# Patient Record
Sex: FEMALE | Race: WHITE | NOT HISPANIC OR LATINO | Employment: OTHER | URBAN - METROPOLITAN AREA
[De-identification: names, ages, dates, MRNs, and addresses within clinical notes are randomized per-mention and may not be internally consistent; named-entity substitution may affect disease eponyms.]

---

## 2017-01-30 ENCOUNTER — ALLSCRIPTS OFFICE VISIT (OUTPATIENT)
Dept: OTHER | Facility: OTHER | Age: 82
End: 2017-01-30

## 2017-02-04 ENCOUNTER — GENERIC CONVERSION - ENCOUNTER (OUTPATIENT)
Dept: OTHER | Facility: OTHER | Age: 82
End: 2017-02-04

## 2017-02-20 ENCOUNTER — GENERIC CONVERSION - ENCOUNTER (OUTPATIENT)
Dept: OTHER | Facility: OTHER | Age: 82
End: 2017-02-20

## 2017-02-21 DIAGNOSIS — M25.559 PAIN IN HIP: ICD-10-CM

## 2017-02-23 ENCOUNTER — ALLSCRIPTS OFFICE VISIT (OUTPATIENT)
Dept: OTHER | Facility: OTHER | Age: 82
End: 2017-02-23

## 2017-04-24 ENCOUNTER — ALLSCRIPTS OFFICE VISIT (OUTPATIENT)
Dept: OTHER | Facility: OTHER | Age: 82
End: 2017-04-24

## 2017-05-22 ENCOUNTER — ALLSCRIPTS OFFICE VISIT (OUTPATIENT)
Dept: OTHER | Facility: OTHER | Age: 82
End: 2017-05-22

## 2017-05-22 DIAGNOSIS — R53.83 OTHER FATIGUE: ICD-10-CM

## 2017-07-19 ENCOUNTER — GENERIC CONVERSION - ENCOUNTER (OUTPATIENT)
Dept: OTHER | Facility: OTHER | Age: 82
End: 2017-07-19

## 2017-09-25 ENCOUNTER — ALLSCRIPTS OFFICE VISIT (OUTPATIENT)
Dept: OTHER | Facility: OTHER | Age: 82
End: 2017-09-25

## 2017-09-25 DIAGNOSIS — R07.89 OTHER CHEST PAIN: ICD-10-CM

## 2017-10-17 ENCOUNTER — GENERIC CONVERSION - ENCOUNTER (OUTPATIENT)
Dept: OTHER | Facility: OTHER | Age: 82
End: 2017-10-17

## 2017-12-04 ENCOUNTER — GENERIC CONVERSION - ENCOUNTER (OUTPATIENT)
Dept: OTHER | Facility: OTHER | Age: 82
End: 2017-12-04

## 2017-12-11 ENCOUNTER — GENERIC CONVERSION - ENCOUNTER (OUTPATIENT)
Dept: FAMILY MEDICINE CLINIC | Facility: CLINIC | Age: 82
End: 2017-12-11

## 2018-01-12 VITALS
HEART RATE: 69 BPM | HEIGHT: 61 IN | BODY MASS INDEX: 29.07 KG/M2 | DIASTOLIC BLOOD PRESSURE: 78 MMHG | WEIGHT: 154 LBS | SYSTOLIC BLOOD PRESSURE: 150 MMHG | RESPIRATION RATE: 15 BRPM

## 2018-01-13 VITALS
RESPIRATION RATE: 15 BRPM | SYSTOLIC BLOOD PRESSURE: 154 MMHG | WEIGHT: 153.25 LBS | HEIGHT: 61 IN | DIASTOLIC BLOOD PRESSURE: 88 MMHG | OXYGEN SATURATION: 97 % | BODY MASS INDEX: 28.93 KG/M2 | HEART RATE: 78 BPM

## 2018-01-14 VITALS
DIASTOLIC BLOOD PRESSURE: 80 MMHG | HEIGHT: 61 IN | WEIGHT: 153 LBS | BODY MASS INDEX: 28.89 KG/M2 | HEART RATE: 92 BPM | OXYGEN SATURATION: 97 % | SYSTOLIC BLOOD PRESSURE: 132 MMHG | TEMPERATURE: 98.5 F | RESPIRATION RATE: 15 BRPM

## 2018-01-14 VITALS
SYSTOLIC BLOOD PRESSURE: 138 MMHG | WEIGHT: 152.38 LBS | DIASTOLIC BLOOD PRESSURE: 80 MMHG | RESPIRATION RATE: 15 BRPM | BODY MASS INDEX: 28.77 KG/M2 | HEIGHT: 61 IN | OXYGEN SATURATION: 96 % | HEART RATE: 76 BPM

## 2018-01-15 VITALS
HEIGHT: 61 IN | SYSTOLIC BLOOD PRESSURE: 152 MMHG | OXYGEN SATURATION: 98 % | HEART RATE: 91 BPM | DIASTOLIC BLOOD PRESSURE: 84 MMHG | BODY MASS INDEX: 29.86 KG/M2 | WEIGHT: 158.13 LBS | RESPIRATION RATE: 15 BRPM

## 2018-01-22 VITALS — DIASTOLIC BLOOD PRESSURE: 90 MMHG | SYSTOLIC BLOOD PRESSURE: 154 MMHG

## 2018-01-22 VITALS — DIASTOLIC BLOOD PRESSURE: 100 MMHG | SYSTOLIC BLOOD PRESSURE: 162 MMHG

## 2018-01-23 NOTE — MISCELLANEOUS
Chief Complaint  Chief Complaint Free Text Note Form: pt rescheduled due to weather  History of Present Illness  TCM Communication [facility]: The patient is being contacted for follow-up after hospitalization and PT CONTACTED FOR JATINDER  She was hospitalized at [facility]  The date of admission: 11/13/2017, date of discharge: 12/6/2017  She was discharged to home  Medications reviewed and updated today  She scheduled a follow up appointment  The patient is currently asymptomatic  Counseling was provided to patient's caretaker  Communication performed and completed by NS      Active Problems    1  Abnormal loss of weight (783 21) (R63 4)   2  Acute sinusitis (461 9) (J01 90)   3  Allergic rhinitis (477 9) (J30 9)   4  Ambulatory dysfunction (719 7) (R26 2)   5  Anemia (285 9) (D64 9)   6  Ankle pain, unspecified laterality   7  Anxiety (300 00) (F41 9)   8  Atypical chest pain (786 59) (R07 89)   9  Backache (724 5) (M54 9)   10  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)   11  Benign essential hypertension (401 1) (I10)   12  Chest pain, precordial (786 51) (R07 2)   13  Chronic GERD (530 81) (K21 9)   14  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)   15  Cough (786 2) (R05)   16  Deep venous thrombosis of distal lower extremity (453 42) (I82 4Z9)   17  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, unspecified laterality (715 97) (M19 079)   18  Dental disorder (525 9) (K08 9)   19  Depression (311) (F32 9)   20  Depression screen (V79 0) (Z13 89)   21  Diaphragmatic hernia without obstruction or gangrene (553 3) (K44 9)   22  Diastolic dysfunction (548 2) (I51 9)   23  Edema (782 3) (R60 9)   24  Encounter for screening for malignant neoplasm of colon (V76 51) (Z12 11)   25  Encounter for special screening examination for genitourinary disorder (V81 6) (Z13 89)   26  Enteritis (558 9) (K52 9)   27  Fatigue (780 79) (R53 83)   28  Heart block (426 9) (I45 9)   29   Hemorrhage of anus and rectum (569 3) (K62 5) 30  Hip pain, unspecified laterality   31  History of allergy (V15 09) (Z88 9)   32  Hyperlipidemia (272 4) (E78 5)   33  Hypertension (401 9) (I10)   34  Joint pain, hip (719 45) (M25 559)   35  Leg swelling (729 81) (M79 89)   36  Need for immunization against influenza (V04 81) (Z23)   37  Need for pneumococcal vaccination (V03 82) (Z23)   38  Osteoarthritis (715 90) (M19 90)   39  Osteoarthritis of knee (715 36) (M17 10)   40  Osteoporosis (733 00) (M81 0)   41  PHN (postherpetic neuralgia) (053 19) (B02 29)   42  Preop examination (V72 84) (Z01 818)   43  Pre-operative clearance (V72 84) (Z01 818)   44  Sciatica (724 3) (M54 30)   45  Screening for neurological condition (V80 09) (Z13 89)   46  Seborrheic keratosis (702 19) (L82 1)   47  Trochanteric bursitis of left hip (726 5) (M70 62)   48  Vertigo (780 4) (R42)    Past Medical History    1  History of Abnormal electrocardiogram (794 31) (R94 31)   2  History of Acute upper respiratory infection (465 9) (J06 9)   3  History of Arthritis (V13 4)   4  History of Diverticulosis (562 10) (K57 90)   5  History of Encounter for screening mammogram for malignant neoplasm of breast   (V76 12) (Z12 31)   6  History of acute sinusitis (V12 69) (Z87 09)   7  History of atrial fibrillation (V12 59) (Z86 79)   8  History of complete atrioventricular block (V12 59) (Z86 79)   9  History of gastroesophageal reflux (GERD) (V12 79) (Z87 19)   10  History of glaucoma (V12 49) (Z86 69)   11  History of hiatal hernia (V12 79) (Z87 19)   12  History of hypertension (V12 59) (Z86 79)   13  History of intermittent claudication (V12 50) (Z86 79)   14  History of Macular degeneration (362 50) (H35 30)   15  History of Macular degeneration (362 50) (H35 30)   16  History of Nausea (787 02) (R11 0)   17  History of Pericardial Effusion (423 9)   18  History of Pulmonary Embolism (V12 51)    Surgical History    1  History of Appendectomy   2  History of Hysterectomy   3   History of Tonsillectomy With Adenoidectomy    Family History  Mother    1  Family history of Hypertension (V17 49)  Father    2  Family history of Diabetes Mellitus (V18 0)   3  Family history of Heart Disease (V17 49)   4  Family history of Hypertension (V17 49)  Sister    5  Family history of Alzheimer Disease   6  Family history of Colon Cancer (V16 0)    Social History    · Denied: History of Alcohol Use (History)   · Never A Smoker    Current Meds   1  ALPRAZolam 0 25 MG Oral Tablet; TAKE 1 TABLET EVERY 12 HOURS AS NEEDED; Last   Rx:09Ynk6764 Ordered   2  DiazePAM 2 MG Oral Tablet; TAKE 1 TABLET TWICE DAILY AS NEEDED; Therapy: 88IKE4430 to (Evaluate:24Mar2018); Last Rx:30Iul2383 Ordered   3  Escitalopram Oxalate 5 MG Oral Tablet; TAKE 1 TABLET DAILY; Therapy: 01WSC6301 to (Blip[employer] Speaker)  Requested for: 99YCF4869; Last   Rx:06Nov2017 Ordered   4  Esomeprazole Magnesium 40 MG Oral Capsule Delayed Release; TAKE 1 CAPSULE BY   MOUTH EVERY DAY AS NEEDED; Therapy: 87NJK0022 to (Evaluate:34Dfp9506)  Requested for: 11EAC5585; Last   Rx:32Hda0312 Ordered   5  Furosemide 20 MG Oral Tablet; take 1 tablet by mouth once daily as directed; Therapy: 72IPR8336 to (Evaluate:18Mar2018)  Requested for: 65Oft4164; Last   Rx:19Sep2017 Ordered   6  Lidoderm 5 % External Patch; APPLY 1 PATCH TO THE AFFECTED AREA AND LEAVE IN   PLACE FOR 12 HOURS, THEN REMOVE AND LEAVE OFF FOR 12 HOURS; Therapy: 91UYL7119 to (Evaluate:18Oct2017)  Requested for: 34Fsu9887; Last   Rx:55Zyl0028 Ordered   7  Medrol 4 MG Oral Tablet Therapy Pack; take as directed; Therapy: 91NRA9241 to (Last Rx:30Jan2017) Ordered   8  Melatonin TABS; Therapy: (Recorded:16Nov2012) to Recorded   9  Methocarbamol 500 MG Oral Tablet; TAKE 1 TABLET 3 TIMES DAILY AS NEEDED; Therapy: 82Bvu8160 to (Last Rx:05Ykq1304) Ordered   10  Montelukast Sodium 10 MG Oral Tablet; TAKE 1 TABLET DAILY AS DIRECTED;     Therapy: 89XXC4232 to (Blip[employer] Speaker)  Requested for: 29OPR7159; Last    YE:44SJK8395 Ordered   11  Proctozone-HC 2 5 % Rectal Cream; APPLY RECTALLY THREE TIMES A DAY AS    DIRECTED; Therapy: 04BNL7550 to (Evaluate:12Vfh9697); Last Rx:30Jun2016 Ordered   12  Valsartan 160 MG Oral Tablet; take 1 tablet daily for blood pressure; Therapy: 57Rxa0609 to (Last Rx:17Oct2017)  Requested for: 17Oct2017 Ordered   13  Voltaren 1 % Transdermal Gel; APPLY TO UPPER EXTREMITIES, 2 GM OF GEL TO    AFFECTED AREA 4 TIMES DAILY  DO NOT APPLY MORE THAN 8 GM DAILY TO ANY    ONE AFFECTED JOINT; Therapy: 69BAD7077 to (Last FL:65WFA3914)  Requested for: 15OFR2904 Ordered   14  Xarelto 15 MG Oral Tablet; Therapy: 89Iye3184 to Recorded    Allergies    1   Codeine Derivatives    Future Appointments    Date/Time Provider Specialty Site   12/28/2017 08:00 AM MD Abdulaziz Story [id number]     Signatures   Electronically signed by : Ayala Acosta MD; Dec 26 2017  1:47PM EST                       (Author)

## 2018-01-30 RX ORDER — MIRTAZAPINE 15 MG/1
1 TABLET, FILM COATED ORAL
COMMUNITY
End: 2018-07-06 | Stop reason: SDUPTHER

## 2018-01-30 RX ORDER — LIDOCAINE 50 MG/G
1 PATCH TOPICAL
COMMUNITY
Start: 2017-02-23 | End: 2019-11-15

## 2018-01-30 RX ORDER — ESOMEPRAZOLE MAGNESIUM 40 MG/1
1 CAPSULE, DELAYED RELEASE ORAL DAILY
COMMUNITY
Start: 2016-06-15 | End: 2018-07-06 | Stop reason: SDUPTHER

## 2018-01-30 RX ORDER — METHOCARBAMOL 500 MG/1
1 TABLET, FILM COATED ORAL 3 TIMES DAILY PRN
COMMUNITY
Start: 2013-09-10 | End: 2019-11-15

## 2018-01-30 RX ORDER — ESCITALOPRAM OXALATE 5 MG/1
1 TABLET ORAL DAILY
COMMUNITY
Start: 2016-05-27 | End: 2018-07-06 | Stop reason: SDUPTHER

## 2018-01-30 RX ORDER — FUROSEMIDE 20 MG/1
40 TABLET ORAL DAILY
COMMUNITY
Start: 2014-07-15 | End: 2018-11-12

## 2018-01-30 RX ORDER — MONTELUKAST SODIUM 10 MG/1
1 TABLET ORAL DAILY
COMMUNITY
Start: 2017-11-06 | End: 2018-03-08 | Stop reason: SDUPTHER

## 2018-01-30 RX ORDER — ALPRAZOLAM 0.25 MG/1
1 TABLET ORAL EVERY 12 HOURS PRN
COMMUNITY
End: 2018-03-08 | Stop reason: SDUPTHER

## 2018-01-30 RX ORDER — DIAZEPAM 2 MG/1
1 TABLET ORAL 2 TIMES DAILY PRN
COMMUNITY
Start: 2017-09-25 | End: 2019-11-15

## 2018-01-30 RX ORDER — POTASSIUM CHLORIDE 1500 MG/1
TABLET, FILM COATED, EXTENDED RELEASE ORAL DAILY
COMMUNITY
Start: 2018-01-04 | End: 2019-11-08 | Stop reason: SDUPTHER

## 2018-01-30 RX ORDER — VALSARTAN 160 MG/1
1 TABLET ORAL DAILY
COMMUNITY
Start: 2017-04-24 | End: 2018-04-20

## 2018-01-30 RX ORDER — PHENOL 1.4 %
3 AEROSOL, SPRAY (ML) MUCOUS MEMBRANE
COMMUNITY

## 2018-02-02 ENCOUNTER — OFFICE VISIT (OUTPATIENT)
Dept: FAMILY MEDICINE CLINIC | Facility: CLINIC | Age: 83
End: 2018-02-02
Payer: MEDICARE

## 2018-02-02 VITALS
HEART RATE: 81 BPM | BODY MASS INDEX: 27.19 KG/M2 | WEIGHT: 144 LBS | SYSTOLIC BLOOD PRESSURE: 128 MMHG | OXYGEN SATURATION: 95 % | DIASTOLIC BLOOD PRESSURE: 64 MMHG | HEIGHT: 61 IN | RESPIRATION RATE: 14 BRPM

## 2018-02-02 DIAGNOSIS — I89.0 LYMPHEDEMA: ICD-10-CM

## 2018-02-02 DIAGNOSIS — I10 ESSENTIAL HYPERTENSION: ICD-10-CM

## 2018-02-02 DIAGNOSIS — I10 BENIGN ESSENTIAL HYPERTENSION: ICD-10-CM

## 2018-02-02 DIAGNOSIS — K59.00 CONSTIPATION, UNSPECIFIED CONSTIPATION TYPE: Primary | ICD-10-CM

## 2018-02-02 DIAGNOSIS — M54.9 BACK PAIN, UNSPECIFIED BACK LOCATION, UNSPECIFIED BACK PAIN LATERALITY, UNSPECIFIED CHRONICITY: ICD-10-CM

## 2018-02-02 DIAGNOSIS — I82.4Z9 DEEP VEIN THROMBOSIS (DVT) OF DISTAL VEIN OF LOWER EXTREMITY, UNSPECIFIED CHRONICITY, UNSPECIFIED LATERALITY (HCC): ICD-10-CM

## 2018-02-02 DIAGNOSIS — I82.5Y9 CHRONIC VENOUS EMBOLISM AND THROMBOSIS OF DEEP VESSELS OF PROXIMAL LOWER EXTREMITY, UNSPECIFIED LATERALITY (HCC): ICD-10-CM

## 2018-02-02 PROBLEM — I48.91 ATRIAL FIBRILLATION (HCC): Status: ACTIVE | Noted: 2017-10-20

## 2018-02-02 PROBLEM — R26.9 GAIT ABNORMALITY: Status: ACTIVE | Noted: 2017-10-27

## 2018-02-02 PROBLEM — B02.29 PHN (POSTHERPETIC NEURALGIA): Status: ACTIVE | Noted: 2017-02-23

## 2018-02-02 PROBLEM — S42.302A FRACTURE OF LEFT HUMERUS: Status: ACTIVE | Noted: 2017-10-27

## 2018-02-02 PROBLEM — S06.5XAA SDH (SUBDURAL HEMATOMA): Status: ACTIVE | Noted: 2017-10-27

## 2018-02-02 PROBLEM — Z86.711 HX PULMONARY EMBOLISM: Status: ACTIVE | Noted: 2017-10-20

## 2018-02-02 PROBLEM — Z86.718 HISTORY OF DVT (DEEP VEIN THROMBOSIS): Status: ACTIVE | Noted: 2017-10-20

## 2018-02-02 PROBLEM — S02.401A CLOSED FRACTURE OF MAXILLARY SINUS (HCC): Status: ACTIVE | Noted: 2017-10-20

## 2018-02-02 PROBLEM — S72.002A FRACTURE OF FEMORAL NECK, LEFT (HCC): Status: ACTIVE | Noted: 2017-10-27

## 2018-02-02 PROBLEM — R41.3 MEMORY IMPAIRMENT: Status: ACTIVE | Noted: 2017-10-25

## 2018-02-02 PROBLEM — Z95.0 PACEMAKER: Status: ACTIVE | Noted: 2017-10-27

## 2018-02-02 PROBLEM — S42.292D CLOSED 4-PART FRACTURE OF PROXIMAL END OF LEFT HUMERUS WITH ROUTINE HEALING: Status: ACTIVE | Noted: 2017-10-27

## 2018-02-02 PROBLEM — F32.A DEPRESSED: Status: ACTIVE | Noted: 2017-10-27

## 2018-02-02 PROBLEM — I60.9 SAH (SUBARACHNOID HEMORRHAGE) (HCC): Status: ACTIVE | Noted: 2017-10-27

## 2018-02-02 PROBLEM — S06.5X9A SDH (SUBDURAL HEMATOMA) (HCC): Status: ACTIVE | Noted: 2017-10-27

## 2018-02-02 PROBLEM — Z96.649 S/P HIP HEMIARTHROPLASTY: Status: ACTIVE | Noted: 2017-10-27

## 2018-02-02 PROBLEM — S06.6X9A SUBARACHNOID HEMORRHAGE FOLLOWING INJURY, WITH LOSS OF CONSCIOUSNESS (HCC): Status: ACTIVE | Noted: 2017-11-21

## 2018-02-02 PROBLEM — S72.009A HIP FRACTURE (HCC): Status: ACTIVE | Noted: 2017-10-19

## 2018-02-02 PROBLEM — Z96.612 STATUS POST REVERSE TOTAL REPLACEMENT OF LEFT SHOULDER: Status: ACTIVE | Noted: 2017-10-27

## 2018-02-02 PROCEDURE — 99214 OFFICE O/P EST MOD 30 MIN: CPT | Performed by: FAMILY MEDICINE

## 2018-02-02 RX ORDER — ACETAMINOPHEN 500 MG
1000 TABLET ORAL DAILY PRN
COMMUNITY
Start: 2017-10-27

## 2018-02-02 RX ORDER — FUROSEMIDE 40 MG/1
40 TABLET ORAL DAILY
Refills: 4 | COMMUNITY
Start: 2018-01-10 | End: 2018-07-06 | Stop reason: SDUPTHER

## 2018-02-02 RX ORDER — FLUTICASONE PROPIONATE 50 MCG
2 SPRAY, SUSPENSION (ML) NASAL
COMMUNITY
End: 2018-10-18 | Stop reason: SDUPTHER

## 2018-02-02 RX ORDER — OXYCODONE HYDROCHLORIDE 15 MG/1
7.5 TABLET ORAL EVERY 6 HOURS
COMMUNITY
Start: 2017-10-27 | End: 2018-07-12

## 2018-02-02 RX ORDER — SENNOSIDES 8.6 MG
1 TABLET ORAL
Qty: 120 EACH | Refills: 5 | Status: SHIPPED | OUTPATIENT
Start: 2018-02-02 | End: 2019-11-15

## 2018-02-02 RX ORDER — ESOMEPRAZOLE MAGNESIUM 40 MG/1
40 CAPSULE, DELAYED RELEASE ORAL
COMMUNITY
End: 2019-01-08 | Stop reason: SDUPTHER

## 2018-02-02 RX ORDER — LIDOCAINE 50 MG/G
1 PATCH TOPICAL DAILY
Qty: 30 PATCH | Refills: 0 | Status: SHIPPED | OUTPATIENT
Start: 2018-02-02 | End: 2018-08-17 | Stop reason: SDUPTHER

## 2018-02-02 RX ORDER — DIPHENOXYLATE HYDROCHLORIDE AND ATROPINE SULFATE 2.5; .025 MG/1; MG/1
1 TABLET ORAL DAILY
COMMUNITY

## 2018-02-02 RX ORDER — POTASSIUM CHLORIDE 20 MEQ/1
20 TABLET, EXTENDED RELEASE ORAL DAILY
Refills: 5 | COMMUNITY
Start: 2018-01-04 | End: 2018-07-06 | Stop reason: SDUPTHER

## 2018-02-02 RX ORDER — RIVAROXABAN 20 MG/1
20 TABLET, FILM COATED ORAL DAILY
Refills: 11 | COMMUNITY
Start: 2018-01-04 | End: 2021-01-08 | Stop reason: SDUPTHER

## 2018-02-02 RX ORDER — SENNA AND DOCUSATE SODIUM 50; 8.6 MG/1; MG/1
1 TABLET, FILM COATED ORAL
COMMUNITY
Start: 2017-10-27 | End: 2019-09-09 | Stop reason: SDUPTHER

## 2018-02-02 NOTE — PROGRESS NOTES
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, unspecified laterality (HCC)    Deep vein thrombosis (DVT) of distal vein of lower extremity, unspecified chronicity, unspecified laterality ([facility] [address] )    Essential hypertension    Other orders  -     ALPRAZolam (XANAX) 0 25 mg tablet; Take 1 tablet by mouth every 12 (twelve) hours as needed  -     diazepam (VALIUM) 2 mg tablet; Take 1 tablet by mouth 2 (two) times a day as needed  -     escitalopram (LEXAPRO) 5 mg tablet; Take 1 tablet by mouth daily  -     esomeprazole (NexIUM) 40 MG capsule; Take 1 capsule by mouth daily  -     furosemide (LASIX) 20 mg tablet; Take 40 mg by mouth daily    -     lidocaine (LIDODERM) 5 %; Apply 1 patch topically  -     Melatonin 10 MG TABS; Take by mouth  -     methocarbamol (ROBAXIN) 500 mg tablet; Take 1 tablet by mouth 3 (three) times a day as needed  -     mirtazapine (REMERON) 15 mg tablet; Take 1 tablet by mouth  -     montelukast (SINGULAIR) 10 mg tablet; Take 1 tablet by mouth daily  -     Potassium Chloride ER 20 MEQ TBCR; Take by mouth daily  -     rivaroxaban (XARELTO) 15 mg tablet; Take by mouth  -     valsartan (DIOVAN) 160 mg tablet;  Take 1 tablet by mouth daily  -     hydrocortisone (PROCTOZONE-HC) 2 5 % rectal cream; Insert into the rectum  -     diclofenac sodium (VOLTAREN) 1 %; Place on the skin  -     multivitamin (THERAGRAN) TABS; Take 1 tablet by mouth daily  -     calcium-vitamin D 250-100 MG-UNIT per tablet; Take 1 tablet by mouth 2 (two) times a day  -     acetaminophen (TYLENOL) 500 mg tablet; Take 1,000 mg by mouth  -     esomeprazole (NexIUM) 40 MG capsule; Take 40 mg by mouth  -     fluticasone (FLONASE) 50 mcg/act nasal spray; 2 sprays into each nostril  -     furosemide (LASIX) 40 mg tablet; Take 40 mg by mouth daily  -     hydrocortisone (ANUSOL-HC) 2 5 % rectal cream; Insert 1 application into the rectum Three times a day  -     oxyCODONE (ROXICODONE) 15 mg immediate release tablet; Take 7 5 mg by mouth every 6 (six) hours  -     potassium chloride (K-DUR,KLOR-CON) 20 mEq tablet; Take 20 mEq by mouth daily  -     XARELTO 20 MG tablet; Take 20 mg by mouth daily  -     senna-docusate sodium (SENOKOT-S) 8 6-50 mg per tablet; Take 1 tablet by mouth          Subjective:      Patient ID: Valarie Alejandre is a 80 y o  female  She fell on October 19, 2017  She was at her sister's house  She states that it was a nice day and she did not slip on ice  She does not believe that she tripped on anything  There was no warning to the fall  She feels that her hip gave out  She was able to get back into her car and go home, but then passed out and was found by a neighbor  She was life flighted to [place] [address]   She had a subdural hematoma with an active bleed, a fractured jaw, a fractured shoulder, and  A hip fracture  There was no intervention required for the subdural hematoma  The fractured jaw a healed without intervention  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  She was in [place] [address] for about a week and then in inpatient rehabilitation for another 10 days  She was then discharged to  [address] [place] [address] for further physical therapy before coming home on December 10th  She feels unsteady on her feet and is off-balance   She has been receiving home physical therapy and is doing well and certainly benefit from this  Both she her son and I feel that she would benefit further from continued physical therapy on both her balance and her shoulder  She has significant lymphedema which limits her mobility  She lives alone and has difficult time applying her usual compression stockings especially given the weakness that is associated with her shoulder replacement  She used pneumatic lymphedema pumps in the hospital and had good success  She would certainly be a good candidate for home-based pneumatic compression therapy and would benefit greatly from its use  The following portions of the patient's history were reviewed and updated as appropriate:   She  has a past medical history of Abnormal electrocardiogram; Arthritis; Atrial fibrillation ([address] ); Complete atrioventricular block ([address] ); Diverticulosis; GERD (gastroesophageal reflux disease); Glaucoma; Hiatal hernia; Hypertension; Intermittent claudication ([address] ); Macular degeneration; Pericardial effusion; and Pulmonary embolism ([address] )  She  does not have any pertinent problems on file  She  has a past surgical history that includes Appendectomy; Hysterectomy; and Tonsillectomy and adenoidectomy  Her family history includes Alzheimer's disease in her sister; Colon cancer in her sister; Diabetes in her father; Heart disease in her father; Hypertension in her father and mother  She  reports that she has never smoked  She has never used smokeless tobacco  She reports that she does not drink alcohol  Her drug history is not on file    Current Outpatient Prescriptions   Medication Sig Dispense Refill    acetaminophen (TYLENOL) 500 mg tablet Take 1,000 mg by mouth      ALPRAZolam (XANAX) 0 25 mg tablet Take 1 tablet by mouth every 12 (twelve) hours as needed      calcium-vitamin D 250-100 MG-UNIT per tablet Take 1 tablet by mouth 2 (two) times a day      diclofenac sodium (VOLTAREN) 1 % Place on the skin      escitalopram (LEXAPRO) 5 mg tablet Take 1 tablet by mouth daily      esomeprazole (NexIUM) 40 MG capsule Take 1 capsule by mouth daily      furosemide (LASIX) 20 mg tablet Take 40 mg by mouth daily        hydrocortisone (PROCTOZONE-HC) 2 5 % rectal cream Insert into the rectum      lidocaine (LIDODERM) 5 % Apply 1 patch topically      Melatonin 10 MG TABS Take by mouth      mirtazapine (REMERON) 15 mg tablet Take 1 tablet by mouth      montelukast (SINGULAIR) 10 mg tablet Take 1 tablet by mouth daily      multivitamin (THERAGRAN) TABS Take 1 tablet by mouth daily      oxyCODONE (ROXICODONE) 15 mg immediate release tablet Take 7 5 mg by mouth every 6 (six) hours      Potassium Chloride ER 20 MEQ TBCR Take by mouth daily      rivaroxaban (XARELTO) 15 mg tablet Take by mouth      senna-docusate sodium (SENOKOT-S) 8 6-50 mg per tablet Take 1 tablet by mouth      diazepam (VALIUM) 2 mg tablet Take 1 tablet by mouth 2 (two) times a day as needed      esomeprazole (NexIUM) 40 MG capsule Take 40 mg by mouth      fluticasone (FLONASE) 50 mcg/act nasal spray 2 sprays into each nostril      furosemide (LASIX) 40 mg tablet Take 40 mg by mouth daily  4    hydrocortisone (ANUSOL-HC) 2 5 % rectal cream Insert 1 application into the rectum Three times a day      lidocaine (LIDODERM) 5 % Place 1 patch on the skin daily Remove & Discard patch within 12 hours or as directed by MD 30 patch 0    methocarbamol (ROBAXIN) 500 mg tablet Take 1 tablet by mouth 3 (three) times a day as needed      potassium chloride (K-DUR,KLOR-CON) 20 mEq tablet Take 20 mEq by mouth daily  5    senna (SENOKOT) 8 6 mg Take 1 tablet (8 6 mg total) by mouth daily at bedtime 120 each 5    valsartan (DIOVAN) 160 mg tablet Take 1 tablet by mouth daily      XARELTO 20 MG tablet Take 20 mg by mouth daily  11     No current facility-administered medications for this visit        No current outpatient prescriptions on file prior to visit  No current facility-administered medications on file prior to visit  She is allergic to codeine       Review of Systems   All other systems reviewed and are negative  Objective:     Physical Exam   Constitutional: She is oriented to person, place, and time  Neck: Normal range of motion  Neck supple  Cardiovascular: Normal rate, regular rhythm, normal heart sounds and intact distal pulses  Pulmonary/Chest: Effort normal and breath sounds normal    Abdominal: Soft  Bowel sounds are normal    Neurological: She is alert and oriented to person, place, and time  She has normal reflexes  Skin: Skin is warm and dry  Nursing note and vitals reviewed

## 2018-02-23 ENCOUNTER — TELEPHONE (OUTPATIENT)
Dept: FAMILY MEDICINE CLINIC | Facility: CLINIC | Age: 83
End: 2018-02-23

## 2018-03-08 DIAGNOSIS — J30.2 SEASONAL ALLERGIC RHINITIS, UNSPECIFIED CHRONICITY, UNSPECIFIED TRIGGER: Primary | ICD-10-CM

## 2018-03-08 DIAGNOSIS — F41.9 ANXIETY: ICD-10-CM

## 2018-03-08 RX ORDER — ALPRAZOLAM 0.25 MG/1
0.25 TABLET ORAL EVERY 12 HOURS PRN
Qty: 60 TABLET | Refills: 5 | Status: SHIPPED | OUTPATIENT
Start: 2018-03-08 | End: 2019-10-09 | Stop reason: SDUPTHER

## 2018-03-08 RX ORDER — MONTELUKAST SODIUM 10 MG/1
10 TABLET ORAL DAILY
Qty: 30 TABLET | Refills: 5 | Status: SHIPPED | OUTPATIENT
Start: 2018-03-08 | End: 2019-01-08 | Stop reason: SDUPTHER

## 2018-04-18 RX ORDER — AMOXICILLIN 500 MG/1
CAPSULE ORAL
COMMUNITY
Start: 2018-01-10 | End: 2019-09-09 | Stop reason: SDUPTHER

## 2018-04-20 ENCOUNTER — OFFICE VISIT (OUTPATIENT)
Dept: FAMILY MEDICINE CLINIC | Facility: CLINIC | Age: 83
End: 2018-04-20
Payer: MEDICARE

## 2018-04-20 VITALS
OXYGEN SATURATION: 93 % | DIASTOLIC BLOOD PRESSURE: 84 MMHG | HEIGHT: 61 IN | BODY MASS INDEX: 28.36 KG/M2 | WEIGHT: 150.2 LBS | SYSTOLIC BLOOD PRESSURE: 152 MMHG | HEART RATE: 75 BPM | RESPIRATION RATE: 16 BRPM

## 2018-04-20 DIAGNOSIS — I45.9 HEART BLOCK: ICD-10-CM

## 2018-04-20 DIAGNOSIS — K44.9 DIAPHRAGMATIC HERNIA WITHOUT OBSTRUCTION OR GANGRENE: ICD-10-CM

## 2018-04-20 DIAGNOSIS — I10 BENIGN ESSENTIAL HYPERTENSION: ICD-10-CM

## 2018-04-20 DIAGNOSIS — I48.91 ATRIAL FIBRILLATION, UNSPECIFIED TYPE (HCC): ICD-10-CM

## 2018-04-20 DIAGNOSIS — I10 ESSENTIAL HYPERTENSION: Primary | ICD-10-CM

## 2018-04-20 PROCEDURE — 99214 OFFICE O/P EST MOD 30 MIN: CPT | Performed by: FAMILY MEDICINE

## 2018-04-20 RX ORDER — VALSARTAN 40 MG/1
40 TABLET ORAL DAILY
Qty: 30 TABLET | Refills: 5 | Status: SHIPPED | OUTPATIENT
Start: 2018-04-20 | End: 2018-08-15

## 2018-04-20 NOTE — PROGRESS NOTES
Assessment/Plan:    No problem-specific Assessment & Plan notes found for this encounter  Diagnoses and all orders for this visit:    Essential hypertension  -     valsartan (DIOVAN) 40 mg tablet; Take 1 tablet (40 mg total) by mouth daily  -     Lipid panel; Future  -     TSH, 3rd generation; Future  -     CBC and differential; Future    Other orders  -     triamcinolone (KENALOG) 0 1 % ointment; APPLY TWICE A DAY TO AFFECTED AREA X1-2 WEEKS  -     amoxicillin (AMOXIL) 500 mg capsule;           Subjective:      Patient ID: Shannan Joseph is a 80 y o  female  Her BP is running a bit high in the office today  She has no chest pain and no increased shortness of breath  She was taken off Valsartan at some point  She continues to follow with cardiology for her history of complete heart block  She is s/p pacer placement  She had multiple complications from the procedure but she has been doing well for some time  She is anticoagulated for questionable afib  She also has a diaphragmatic hernia  She has been evaluated by cardiothoracic surgery and is not a surgical candidate  She has on going fatigue  The following portions of the patient's history were reviewed and updated as appropriate:   She  has a past medical history of Abnormal electrocardiogram; Arthritis; Atrial fibrillation ([address] ); Complete atrioventricular block ([address] ); Diverticulosis; GERD (gastroesophageal reflux disease); Glaucoma; Hiatal hernia; Hypertension; Intermittent claudication ([address] ); Macular degeneration; Pericardial effusion; and Pulmonary embolism ([address] )    She   Patient Active Problem List    Diagnosis Date Noted    Constipation 02/02/2018    Lymphedema 02/02/2018    Subarachnoid hemorrhage following injury, with loss of consciousness ([address] ) 11/21/2017    Closed 4-part fracture of proximal end of left humerus with routine healing 10/27/2017    Depressed 10/27/2017    Fracture of femoral neck, left ([address] ) 10/27/2017    Fracture of left humerus 10/27/2017    Gait abnormality 10/27/2017    Pacemaker 10/27/2017    S/P hip hemiarthroplasty 10/27/2017    SAH (subarachnoid hemorrhage) ([facility]) 10/27/2017    SDH (subdural hematoma) ([facility]) 10/27/2017    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    Memory impairment 10/25/2017    Atrial fibrillation ([facility]) 10/20/2017    Closed fracture of maxillary sinus ([facility]) 10/20/2017    History of DVT (deep vein thrombosis) 10/20/2017    Hx pulmonary embolism 10/20/2017    Hip fracture ([facility]) 10/19/2017    PHN (postherpetic neuralgia) 02/23/2017    Cough 08/22/2016    Hemorrhage of anus and rectum 06/30/2016    Chronic GERD 06/15/2016    Ambulatory dysfunction 04/12/2016    Joint pain, hip 01/04/2016    Trochanteric bursitis of left hip 01/04/2016    Enteritis 12/15/2015    Diaphragmatic hernia without obstruction or gangrene 06/01/2015    Seborrheic keratosis 01/23/2015    Hypertension 12/34/5188    Diastolic dysfunction 89/24/1180    Fatigue 07/15/2014    Edema 06/30/2014    Anemia 11/14/2013    Heart block 11/14/2013    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 ([facility]) 09/10/2013    Sciatica 09/10/2013    Backache 08/30/2013    Vertigo 08/08/2013    Leg swelling 08/01/2013    Hip pain, acute, unspecified laterality 01/11/2013    Deep venous thrombosis of distal lower extremity [facility] 11/20/2012    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, unspecified laterality 11/20/2012    Osteoarthritis of knee 11/20/2012    Osteoporosis 11/20/2012    Anxiety 11/16/2012    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    Benign essential hypertension 11/16/2012    Hyperlipidemia 11/16/2012    Osteoarthritis 11/16/2012     She  has a past surgical history that includes Appendectomy; Hysterectomy; and Tonsillectomy and adenoidectomy    Her family history includes Alzheimer's disease in her sister; Colon cancer in her sister; Diabetes in her father; Heart disease in her father; Hypertension in her father and mother  She  reports that she has never smoked  She has never used smokeless tobacco  She reports that she does not drink alcohol or use drugs    Current Outpatient Prescriptions   Medication Sig Dispense Refill    acetaminophen (TYLENOL) 500 mg tablet Take 1,000 mg by mouth      ALPRAZolam (XANAX) 0 25 mg tablet Take 1 tablet (0 25 mg total) by mouth every 12 (twelve) hours as needed for anxiety 60 tablet 5    amoxicillin (AMOXIL) 500 mg capsule       calcium-vitamin D 250-100 MG-UNIT per tablet Take 1 tablet by mouth 2 (two) times a day      diazepam (VALIUM) 2 mg tablet Take 1 tablet by mouth 2 (two) times a day as needed      diclofenac sodium (VOLTAREN) 1 % Place on the skin      escitalopram (LEXAPRO) 5 mg tablet Take 1 tablet by mouth daily      esomeprazole (NexIUM) 40 MG capsule Take 1 capsule by mouth daily      fluticasone (FLONASE) 50 mcg/act nasal spray 2 sprays into each nostril      furosemide (LASIX) 40 mg tablet Take 40 mg by mouth daily  4    hydrocortisone (ANUSOL-HC) 2 5 % rectal cream Insert 1 application into the rectum Three times a day      hydrocortisone (PROCTOZONE-HC) 2 5 % rectal cream Insert into the rectum      lidocaine (LIDODERM) 5 % Apply 1 patch topically      lidocaine (LIDODERM) 5 % Place 1 patch on the skin daily Remove & Discard patch within 12 hours or as directed by MD 30 patch 0    Melatonin 10 MG TABS Take by mouth      methocarbamol (ROBAXIN) 500 mg tablet Take 1 tablet by mouth 3 (three) times a day as needed      mirtazapine (REMERON) 15 mg tablet Take 1 tablet by mouth      montelukast (SINGULAIR) 10 mg tablet Take 1 tablet (10 mg total) by mouth daily 30 tablet 5    multivitamin (THERAGRAN) TABS Take 1 tablet by mouth daily      oxyCODONE (ROXICODONE) 15 mg immediate release tablet Take 7 5 mg by mouth every 6 (six) hours      potassium chloride (K-DUR,KLOR-CON) 20 mEq tablet Take 20 mEq by mouth daily  5    Potassium Chloride ER 20 MEQ TBCR Take by mouth daily      rivaroxaban (XARELTO) 15 mg tablet Take by mouth      senna (SENOKOT) 8 6 mg Take 1 tablet (8 6 mg total) by mouth daily at bedtime 120 each 5    senna-docusate sodium (SENOKOT-S) 8 6-50 mg per tablet Take 1 tablet by mouth      triamcinolone (KENALOG) 0 1 % ointment APPLY TWICE A DAY TO AFFECTED AREA X1-2 WEEKS  0    XARELTO 20 MG tablet Take 20 mg by mouth daily  11    esomeprazole (NexIUM) 40 MG capsule Take 40 mg by mouth      furosemide (LASIX) 20 mg tablet Take 40 mg by mouth daily        valsartan (DIOVAN) 40 mg tablet Take 1 tablet (40 mg total) by mouth daily 30 tablet 5     No current facility-administered medications for this visit        Current Outpatient Prescriptions on File Prior to Visit   Medication Sig    acetaminophen (TYLENOL) 500 mg tablet Take 1,000 mg by mouth    ALPRAZolam (XANAX) 0 25 mg tablet Take 1 tablet (0 25 mg total) by mouth every 12 (twelve) hours as needed for anxiety    calcium-vitamin D 250-100 MG-UNIT per tablet Take 1 tablet by mouth 2 (two) times a day    diazepam (VALIUM) 2 mg tablet Take 1 tablet by mouth 2 (two) times a day as needed    diclofenac sodium (VOLTAREN) 1 % Place on the skin    escitalopram (LEXAPRO) 5 mg tablet Take 1 tablet by mouth daily    esomeprazole (NexIUM) 40 MG capsule Take 1 capsule by mouth daily    fluticasone (FLONASE) 50 mcg/act nasal spray 2 sprays into each nostril    furosemide (LASIX) 40 mg tablet Take 40 mg by mouth daily    hydrocortisone (ANUSOL-HC) 2 5 % rectal cream Insert 1 application into the rectum Three times a day    hydrocortisone (PROCTOZONE-HC) 2 5 % rectal cream Insert into the rectum    lidocaine (LIDODERM) 5 % Apply 1 patch topically    lidocaine (LIDODERM) 5 % Place 1 patch on the skin daily Remove & Discard patch within 12 hours or as directed by MD    Melatonin 10 MG TABS Take by mouth    methocarbamol (ROBAXIN) 500 mg tablet Take 1 tablet by mouth 3 (three) times a day as needed    mirtazapine (REMERON) 15 mg tablet Take 1 tablet by mouth    montelukast (SINGULAIR) 10 mg tablet Take 1 tablet (10 mg total) by mouth daily    multivitamin (THERAGRAN) TABS Take 1 tablet by mouth daily    oxyCODONE (ROXICODONE) 15 mg immediate release tablet Take 7 5 mg by mouth every 6 (six) hours    potassium chloride (K-DUR,KLOR-CON) 20 mEq tablet Take 20 mEq by mouth daily    Potassium Chloride ER 20 MEQ TBCR Take by mouth daily    rivaroxaban (XARELTO) 15 mg tablet Take by mouth    senna (SENOKOT) 8 6 mg Take 1 tablet (8 6 mg total) by mouth daily at bedtime    senna-docusate sodium (SENOKOT-S) 8 6-50 mg per tablet Take 1 tablet by mouth    XARELTO 20 MG tablet Take 20 mg by mouth daily    [DISCONTINUED] valsartan (DIOVAN) 160 mg tablet Take 1 tablet by mouth daily    esomeprazole (NexIUM) 40 MG capsule Take 40 mg by mouth    furosemide (LASIX) 20 mg tablet Take 40 mg by mouth daily       No current facility-administered medications on file prior to visit  She is allergic to codeine       Review of Systems   All other systems reviewed and are negative  Objective:      /84 (BP Location: Right arm, Patient Position: Sitting, Cuff Size: Standard)   Pulse 75   Resp 16   Ht 5' 1" (1 549 m)   Wt 68 1 kg (150 lb 3 2 oz)   SpO2 93%   BMI 28 38 kg/m²          Physical Exam   Constitutional: She is oriented to person, place, and time  Neck: Normal range of motion  Neck supple  Cardiovascular: Normal rate, regular rhythm, normal heart sounds and intact distal pulses  Pulmonary/Chest: Effort normal and breath sounds normal    Abdominal: Soft  Bowel sounds are normal    Musculoskeletal: Normal range of motion  Neurological: She is alert and oriented to person, place, and time  She has normal reflexes  Skin: Skin is warm and dry  Psychiatric: She has a normal mood and affect   Her behavior is normal  Judgment and thought content normal    Nursing note and vitals reviewed

## 2018-04-20 NOTE — PATIENT INSTRUCTIONS
Chronic Hypertension   AMBULATORY CARE:   Hypertension  is high blood pressure (BP)  Your BP is the force of your blood moving against the walls of your arteries  Normal BP is less than 120/80  Prehypertension is between 120/80 and 139/89  Hypertension is 140/90 or higher  Hypertension causes your BP to get so high that your heart has to work much harder than normal  This can damage your heart  Chronic hypertension is a long-term condition that you can control with a healthy lifestyle or medicines  A controlled blood pressure helps protect your organs, such as your heart, lungs, brain, and kidneys  Common symptoms include the following:   · Headache     · Blurred vision    · Chest pain     · Dizziness or weakness     · Trouble breathing     · Nosebleeds  Call 911 for any of the following:   · You have discomfort in your chest that feels like squeezing, pressure, fullness, or pain  · You become confused or have difficulty speaking  · You suddenly feel lightheaded or have trouble breathing  · You have pain or discomfort in your back, neck, jaw, stomach, or arm  Seek care immediately if:   · You have a severe headache or vision loss  · You have weakness in an arm or leg  Contact your healthcare provider if:   · You feel faint, dizzy, confused, or drowsy  · You have been taking your BP medicine and your BP is still higher than your healthcare provider says it should be  · You have questions or concerns about your condition or care  Treatment for chronic hypertension  may include medicine to lower your BP and lower your cholesterol level  A low cholesterol level helps prevent heart disease and makes it easier to control your blood pressure  Heart disease can make your blood pressure harder to control  You may also need to make lifestyle changes  Take your medicine exactly as directed    Manage chronic hypertension:  Talk with your healthcare provider about these and other ways to manage hypertension:  · Take your BP at home  Sit and rest for 5 minutes before you take your BP  Extend your arm and support it on a flat surface  Your arm should be at the same level as your heart  Follow the directions that came with your BP monitor  If possible, take at least 2 BP readings each time  Take your BP at least twice a day at the same times each day, such as morning and evening  Keep a record of your BP readings and bring it to your follow-up visits  Ask your healthcare provider what your blood pressure should be  · Limit sodium (salt) as directed  Too much sodium can affect your fluid balance  Check labels to find low-sodium or no-salt-added foods  Some low-sodium foods use potassium salts for flavor  Too much potassium can also cause health problems  Your healthcare provider will tell you how much sodium and potassium are safe for you to have in a day  He or she may recommend that you limit sodium to 2,300 mg a day  · Follow the meal plan recommended by your healthcare provider  A dietitian or your provider can give you more information on low-sodium plans or the DASH (Dietary Approaches to Stop Hypertension) eating plan  The DASH plan is low in sodium, unhealthy fats, and total fat  It is high in potassium, calcium, and fiber  · Exercise to maintain a healthy weight  Exercise at least 30 minutes per day, on most days of the week  This will help decrease your blood pressure  Ask about the best exercise plan for you  · Decrease stress  This may help lower your BP  Learn ways to relax, such as deep breathing or listening to music  · Limit alcohol  Women should limit alcohol to 1 drink a day  Men should limit alcohol to 2 drinks a day  A drink of alcohol is 12 ounces of beer, 5 ounces of wine, or 1½ ounces of liquor  · Do not smoke  Nicotine and other chemicals in cigarettes and cigars can increase your BP and also cause lung damage   Ask your healthcare provider for information if you currently smoke and need help to quit  E-cigarettes or smokeless tobacco still contain nicotine  Talk to your healthcare provider before you use these products  Follow up with your healthcare provider as directed: You will need to return to have your BP checked and to have other lab tests done  Write down your questions so you remember to ask them during your visits  © 2017 [employer] Information is for End User's use only and may not be sold, redistributed or otherwise used for commercial purposes  All illustrations and images included in CareNotes® are the copyrighted property of [employer]  or [employer]  The above information is an  only  It is not intended as medical advice for individual conditions or treatments  Talk to your doctor, nurse or pharmacist before following any medical regimen to see if it is safe and effective for you  Valsartan (By mouth)   Valsartan (eddie-GULSHAN-tan)  Treats high blood pressure and heart failure  May lower the risk of death after a heart attack  This medicine is an angiotensin receptor blocker (ARB)  Brand Name(s): Diovan   There may be other brand names for this medicine  When This Medicine Should Not Be Used: This medicine is not right for everyone  Do not use it if you had an allergic reaction to valsartan, or if you are pregnant  How to Use This Medicine:   Capsule, Tablet  · Take your medicine as directed  Your dose may need to be changed several times to find what works best for you  · Oral liquid: Shake the bottle well for at least 10 seconds before you measure the dose  Measure the oral liquid medicine with a marked measuring spoon, oral syringe, or medicine cup  · Read and follow the patient instructions that come with this medicine  Talk to your doctor or pharmacist if you have any questions  · Missed dose: Take a dose as soon as you remember   If it is almost time for your next dose, wait until then and take a regular dose  Do not take extra medicine to make up for a missed dose  · Store the medicine in a closed container at room temperature, away from heat, moisture, and direct light  Store the oral liquid at room temperature for up to 30 days or in the refrigerator for up to 75 days  Drugs and Foods to Avoid:   Ask your doctor or pharmacist before using any other medicine, including over-the-counter medicines, vitamins, and herbal products  · Do not use this medicine together with aliskiren, especially if you have diabetes or kidney disease  · Some medicines can affect how valsartan works  Tell your doctor if you also use any of the following:  ¨ Cyclosporine, lithium, rifampin, ritonavir  ¨ ACE inhibitor blood pressure medicine  ¨ Diuretic (water pill)  ¨ Heparin  ¨ NSAID pain or arthritis medicine, including aspirin, diclofenac, ibuprofen, naproxen  · Ask your doctor before you use any medicine, supplement, or salt substitute that contains potassium  Warnings While Using This Medicine:   · It is not safe to take this medicine during pregnancy  It could harm an unborn baby  Tell your doctor right away if you become pregnant  · Tell your doctor if you are breastfeeding, or if you have kidney problems, liver disease, or heart or blood vessel problems  · This medicine could lower your blood pressure too much, especially when you first use it or if you are dehydrated  Stand or sit up slowly if you feel lightheaded or dizzy  · Your doctor will do lab tests at regular visits to check on the effects of this medicine  Keep all appointments  · Keep all medicine out of the reach of children  Never share your medicine with anyone    Possible Side Effects While Using This Medicine:   Call your doctor right away if you notice any of these side effects:  · Allergic reaction: Itching or hives, swelling in your face or hands, swelling or tingling in your mouth or throat, chest tightness, trouble breathing  · Change in how much or how often you urinate, bloody or cloudy urine  · Confusion, weakness, uneven heartbeat, trouble breathing, numbness in your hands, feet, or lips  · Lightheadedness, dizziness, fainting  · Rapid weight gain, swelling in your hands, ankles, or feet  If you notice other side effects that you think are caused by this medicine, tell your doctor  Call your doctor for medical advice about side effects  You may report side effects to FDA at 9-521-FDA-1788  © 2017 [employer] Information is for End User's use only and may not be sold, redistributed or otherwise used for commercial purposes  The above information is an  only  It is not intended as medical advice for individual conditions or treatments  Talk to your doctor, nurse or pharmacist before following any medical regimen to see if it is safe and effective for you

## 2018-07-06 DIAGNOSIS — R60.0 LOCALIZED EDEMA: ICD-10-CM

## 2018-07-06 DIAGNOSIS — F41.9 ANXIETY: Primary | ICD-10-CM

## 2018-07-06 DIAGNOSIS — F32.89 OTHER DEPRESSION: ICD-10-CM

## 2018-07-06 DIAGNOSIS — K21.9 GASTROESOPHAGEAL REFLUX DISEASE, ESOPHAGITIS PRESENCE NOT SPECIFIED: Primary | ICD-10-CM

## 2018-07-06 RX ORDER — ESOMEPRAZOLE MAGNESIUM 40 MG/1
40 CAPSULE, DELAYED RELEASE ORAL DAILY
Qty: 30 CAPSULE | Refills: 5 | Status: SHIPPED | OUTPATIENT
Start: 2018-07-06 | End: 2018-11-12

## 2018-07-06 RX ORDER — FUROSEMIDE 40 MG/1
40 TABLET ORAL DAILY
Qty: 30 TABLET | Refills: 5 | Status: SHIPPED | OUTPATIENT
Start: 2018-07-06 | End: 2019-02-08 | Stop reason: SDUPTHER

## 2018-07-06 RX ORDER — MIRTAZAPINE 15 MG/1
15 TABLET, FILM COATED ORAL
Qty: 30 TABLET | Refills: 5 | Status: SHIPPED | OUTPATIENT
Start: 2018-07-06 | End: 2019-01-08 | Stop reason: SDUPTHER

## 2018-07-06 RX ORDER — POTASSIUM CHLORIDE 20 MEQ/1
20 TABLET, EXTENDED RELEASE ORAL DAILY
Qty: 30 TABLET | Refills: 5 | Status: SHIPPED | OUTPATIENT
Start: 2018-07-06 | End: 2019-02-08 | Stop reason: SDUPTHER

## 2018-07-08 RX ORDER — ESCITALOPRAM OXALATE 5 MG/1
TABLET ORAL
Qty: 30 TABLET | Refills: 5 | Status: SHIPPED | OUTPATIENT
Start: 2018-07-08 | End: 2019-01-08 | Stop reason: SDUPTHER

## 2018-07-12 ENCOUNTER — OFFICE VISIT (OUTPATIENT)
Dept: FAMILY MEDICINE CLINIC | Facility: CLINIC | Age: 83
End: 2018-07-12
Payer: MEDICARE

## 2018-07-12 VITALS
HEIGHT: 61 IN | RESPIRATION RATE: 15 BRPM | HEART RATE: 87 BPM | SYSTOLIC BLOOD PRESSURE: 124 MMHG | WEIGHT: 155 LBS | DIASTOLIC BLOOD PRESSURE: 70 MMHG | BODY MASS INDEX: 29.27 KG/M2 | OXYGEN SATURATION: 96 %

## 2018-07-12 DIAGNOSIS — I60.9 SAH (SUBARACHNOID HEMORRHAGE) (HCC): ICD-10-CM

## 2018-07-12 DIAGNOSIS — M25.562 CHRONIC PAIN OF BOTH KNEES: ICD-10-CM

## 2018-07-12 DIAGNOSIS — I45.9 HEART BLOCK: ICD-10-CM

## 2018-07-12 DIAGNOSIS — M25.561 CHRONIC PAIN OF BOTH KNEES: ICD-10-CM

## 2018-07-12 DIAGNOSIS — I82.4Z9 DEEP VEIN THROMBOSIS (DVT) OF DISTAL VEIN OF LOWER EXTREMITY, UNSPECIFIED CHRONICITY, UNSPECIFIED LATERALITY (HCC): ICD-10-CM

## 2018-07-12 DIAGNOSIS — I10 ESSENTIAL HYPERTENSION: ICD-10-CM

## 2018-07-12 DIAGNOSIS — Z96.612 STATUS POST REVERSE TOTAL REPLACEMENT OF LEFT SHOULDER: ICD-10-CM

## 2018-07-12 DIAGNOSIS — S06.5X9A SDH (SUBDURAL HEMATOMA) (HCC): ICD-10-CM

## 2018-07-12 DIAGNOSIS — I10 BENIGN ESSENTIAL HYPERTENSION: Primary | ICD-10-CM

## 2018-07-12 DIAGNOSIS — Z96.649 S/P HIP HEMIARTHROPLASTY: ICD-10-CM

## 2018-07-12 DIAGNOSIS — G89.29 CHRONIC PAIN OF BOTH KNEES: ICD-10-CM

## 2018-07-12 PROCEDURE — 99214 OFFICE O/P EST MOD 30 MIN: CPT | Performed by: FAMILY MEDICINE

## 2018-07-27 ENCOUNTER — OFFICE VISIT (OUTPATIENT)
Dept: OBGYN CLINIC | Facility: CLINIC | Age: 83
End: 2018-07-27
Payer: MEDICARE

## 2018-07-27 ENCOUNTER — APPOINTMENT (OUTPATIENT)
Dept: RADIOLOGY | Facility: MEDICAL CENTER | Age: 83
End: 2018-07-27
Payer: MEDICARE

## 2018-07-27 VITALS
HEIGHT: 61 IN | WEIGHT: 153 LBS | BODY MASS INDEX: 28.89 KG/M2 | HEART RATE: 71 BPM | SYSTOLIC BLOOD PRESSURE: 121 MMHG | DIASTOLIC BLOOD PRESSURE: 73 MMHG

## 2018-07-27 DIAGNOSIS — M17.0 PRIMARY OSTEOARTHRITIS OF BOTH KNEES: ICD-10-CM

## 2018-07-27 DIAGNOSIS — G89.29 CHRONIC PAIN OF BOTH KNEES: ICD-10-CM

## 2018-07-27 DIAGNOSIS — M25.562 CHRONIC PAIN OF BOTH KNEES: ICD-10-CM

## 2018-07-27 DIAGNOSIS — M25.561 CHRONIC PAIN OF BOTH KNEES: ICD-10-CM

## 2018-07-27 PROCEDURE — 20610 DRAIN/INJ JOINT/BURSA W/O US: CPT | Performed by: PHYSICIAN ASSISTANT

## 2018-07-27 PROCEDURE — 99203 OFFICE O/P NEW LOW 30 MIN: CPT | Performed by: PHYSICIAN ASSISTANT

## 2018-07-27 PROCEDURE — 73562 X-RAY EXAM OF KNEE 3: CPT

## 2018-07-27 RX ORDER — BETAMETHASONE SODIUM PHOSPHATE AND BETAMETHASONE ACETATE 3; 3 MG/ML; MG/ML
6 INJECTION, SUSPENSION INTRA-ARTICULAR; INTRALESIONAL; INTRAMUSCULAR; SOFT TISSUE
Status: COMPLETED | OUTPATIENT
Start: 2018-07-27 | End: 2018-07-27

## 2018-07-27 RX ORDER — LIDOCAINE HYDROCHLORIDE 10 MG/ML
2 INJECTION, SOLUTION INFILTRATION; PERINEURAL
Status: COMPLETED | OUTPATIENT
Start: 2018-07-27 | End: 2018-07-27

## 2018-07-27 RX ADMIN — BETAMETHASONE SODIUM PHOSPHATE AND BETAMETHASONE ACETATE 6 MG: 3; 3 INJECTION, SUSPENSION INTRA-ARTICULAR; INTRALESIONAL; INTRAMUSCULAR; SOFT TISSUE at 13:54

## 2018-07-27 RX ADMIN — LIDOCAINE HYDROCHLORIDE 2 ML: 10 INJECTION, SOLUTION INFILTRATION; PERINEURAL at 13:55

## 2018-07-27 RX ADMIN — BETAMETHASONE SODIUM PHOSPHATE AND BETAMETHASONE ACETATE 6 MG: 3; 3 INJECTION, SUSPENSION INTRA-ARTICULAR; INTRALESIONAL; INTRAMUSCULAR; SOFT TISSUE at 13:55

## 2018-07-27 RX ADMIN — LIDOCAINE HYDROCHLORIDE 2 ML: 10 INJECTION, SOLUTION INFILTRATION; PERINEURAL at 13:54

## 2018-07-27 NOTE — PATIENT INSTRUCTIONS
Patient is adamant about not have any type of surgery which is agreeable  She is given cortisone injections both knees today  She will ice each knee 20 minutes 1 time today  She will maintain activity as tolerated  We will schedule [employer] supplements and follow-up appointment when that is available  Continue weight-bearing as tolerated use of walker  She was provided with a Synvisc-One pamphlet for her review today

## 2018-07-27 NOTE — PROGRESS NOTES
Assessment:    1  Primary osteoarthritis of both knees    2  Chronic pain of both knees      Plan:  Patient is adamant about not have any type of surgery which is agreeable  She is given cortisone injections both knees today  She will ice each knee 20 minutes 1 time today  She will maintain activity as tolerated  We will schedule Visco supplements and follow-up appointment when that is available  Continue weight-bearing as tolerated use of walker  She was provided with a Synvisc-One pamphlet for her review today  Chief Complaint:  Bilateral knee pain , right greater than left    HPI  Blanche Valdes is a 80 y o  female with bilateral knee pain  She denies injury trauma or fall  She walks with a walker  She reports that she was in [facility] and at that point time got injections in her knees in February 2018 she is unsure whether was Visco supplements or steroid but it was likely a steroid injection  She reports that she had no ill affects from knees and that they work well for her  She does not want any type of surgical intervention  She denies any locking or giving way      Past Medical History:   Diagnosis Date    Abnormal electrocardiogram     Last Assessed: 21mar2013    Arthritis     Atrial fibrillation [facility])     Last Assessed: [id number]    Complete atrioventricular block [facility])     Last Assessed: 21Mar2013    Diverticulosis     Last Assessed: [id number]    GERD (gastroesophageal reflux disease)     Last Assessed: 21Mar2013    Glaucoma     Hiatal hernia     Hypertension     Resolved: [id number]    Intermittent claudication [facility])     Last Assessed: [id number]    Macular degeneration     Last Assessed: 21Mar2013    Pericardial effusion     Pulmonary embolism [facility])      Past Surgical History:   Procedure Laterality Date    APPENDECTOMY      HYSTERECTOMY      TONSILLECTOMY AND ADENOIDECTOMY       Allergies   Allergen Reactions    Codeine Other (See Comments)     Like I was floating  Felt dizzy  Took codeine many years ago       Current Outpatient Prescriptions:     acetaminophen (TYLENOL) 500 mg tablet, Take 1,000 mg by mouth, Disp: , Rfl:     ALPRAZolam (XANAX) 0 25 mg tablet, Take 1 tablet (0 25 mg total) by mouth every 12 (twelve) hours as needed for anxiety, Disp: 60 tablet, Rfl: 5    calcium-vitamin D 250-100 MG-UNIT per tablet, Take 1 tablet by mouth 2 (two) times a day, Disp: , Rfl:     diclofenac sodium (VOLTAREN) 1 %, Place on the skin, Disp: , Rfl:     escitalopram (LEXAPRO) 5 mg tablet, TAKE 1 TABLET DAILY  , Disp: 30 tablet, Rfl: 5    esomeprazole (NexIUM) 40 MG capsule, Take 40 mg by mouth, Disp: , Rfl:     fluticasone (FLONASE) 50 mcg/act nasal spray, 2 sprays into each nostril, Disp: , Rfl:     furosemide (LASIX) 40 mg tablet, Take 1 tablet (40 mg total) by mouth daily, Disp: 30 tablet, Rfl: 5    lidocaine (LIDODERM) 5 %, Apply 1 patch topically, Disp: , Rfl:     Melatonin 10 MG TABS, Take 3 mg by mouth  , Disp: , Rfl:     mirtazapine (REMERON) 15 mg tablet, Take 1 tablet (15 mg total) by mouth daily at bedtime, Disp: 30 tablet, Rfl: 5    montelukast (SINGULAIR) 10 mg tablet, Take 1 tablet (10 mg total) by mouth daily, Disp: 30 tablet, Rfl: 5    multivitamin (THERAGRAN) TABS, Take 1 tablet by mouth daily, Disp: , Rfl:     potassium chloride (K-DUR,KLOR-CON) 20 mEq tablet, Take 1 tablet (20 mEq total) by mouth daily, Disp: 30 tablet, Rfl: 5    rivaroxaban (XARELTO) 15 mg tablet, Take by mouth, Disp: , Rfl:     senna (SENOKOT) 8 6 mg, Take 1 tablet (8 6 mg total) by mouth daily at bedtime, Disp: 120 each, Rfl: 5    valsartan (DIOVAN) 40 mg tablet, Take 1 tablet (40 mg total) by mouth daily, Disp: 30 tablet, Rfl: 5    XARELTO 20 MG tablet, Take 20 mg by mouth daily, Disp: , Rfl: 11    amoxicillin (AMOXIL) 500 mg capsule, , Disp: , Rfl:     diazepam (VALIUM) 2 mg tablet, Take 1 tablet by mouth 2 (two) times a day as needed, Disp: , Rfl:     esomeprazole (NexIUM) 40 MG capsule, Take 1 capsule (40 mg total) by mouth daily, Disp: 30 capsule, Rfl: 5    furosemide (LASIX) 20 mg tablet, Take 40 mg by mouth daily  , Disp: , Rfl:     hydrocortisone (ANUSOL-HC) 2 5 % rectal cream, Insert 1 application into the rectum Three times a day, Disp: , Rfl:     hydrocortisone (PROCTOZONE-HC) 2 5 % rectal cream, Insert into the rectum, Disp: , Rfl:     lidocaine (LIDODERM) 5 %, Place 1 patch on the skin daily Remove & Discard patch within 12 hours or as directed by MD, Disp: 30 patch, Rfl: 0    methocarbamol (ROBAXIN) 500 mg tablet, Take 1 tablet by mouth 3 (three) times a day as needed, Disp: , Rfl:     Potassium Chloride ER 20 MEQ TBCR, Take by mouth daily, Disp: , Rfl:     senna-docusate sodium (SENOKOT-S) 8 6-50 mg per tablet, Take 1 tablet by mouth, Disp: , Rfl:     triamcinolone (KENALOG) 0 1 % ointment, APPLY TWICE A DAY TO AFFECTED AREA X1-2 WEEKS, Disp: , Rfl: 0    Social History     Occupational History    Not on file  Social History Main Topics    Smoking status: Never Smoker    Smokeless tobacco: Never Used    Alcohol use No    Drug use: No    Sexual activity: Not on file     Review of Systems   Constitutional: Negative  HENT: Positive for hearing loss  Eyes: Negative  Respiratory: Positive for shortness of breath  Cardiovascular: Positive for palpitations and leg swelling  Gastrointestinal: Negative  Endocrine: Negative  Genitourinary: Negative  Musculoskeletal: Positive for arthralgias and gait problem  As per HPI  Skin: Negative  Allergic/Immunologic: Negative  Neurological: Positive for dizziness  Hematological: Negative  Psychiatric/Behavioral: Negative  Anxiety     Objective:  Blood pressure 121/73, pulse 71, height 5' 1" (1 549 m), weight 69 4 kg (153 lb)  Body mass index is 28 91 kg/m²  Physical Exam   Constitutional: Patient is oriented to person, place, and time   Patient appears well-developed and well-nourished  No distress  HENT:   Head: Normocephalic  Eyes: Conjunctivae are normal  Right eye exhibits no discharge  Left eye exhibits no discharge  No scleral icterus  Cardiovascular: Normal rate  Pulmonary/Chest: Effort normal    Neurological: Patient is alert and oriented to person, place, and time  Skin: Skin is warm and dry  No rash noted  Patient is not diaphoretic  No erythema  No pallor  Psychiatric: Patient has a normal mood and affect  Patient's behavior is normal  Judgment and thought content normal      Ortho Exam    Bilateral knees without cutaneous lesions  She maintains full extension bilaterally flexion 120  She is grossly neurovascular intact  No gross ligamentous laxity  Minimal patellofemoral crepitation with range of motion on the right none with the left  Manual muscle strength 4/5 quads and hamstrings symmetric bilaterally  She has bilateral lower extremity edema control with Capo stockings  There are no open wounds  I have personally reviewed pertinent films in PACS and my interpretation is  mild-to-moderate arthritis primarily medial compartment and patellofemoral compartments bilaterally with the right more pronounced than the left  No fracture dislocations       Large joint arthrocentesis  Date/Time: 7/27/2018 1:54 PM  Consent given by: patient  Timeout: Immediately prior to procedure a time out was called to verify the correct patient, procedure, equipment, support staff and site/side marked as required   Supporting Documentation  Indications: pain   Procedure Details  Location: knee - R knee  Preparation: Patient was prepped and draped in the usual sterile fashion  Needle size: 22 G  Ultrasound guidance: no  Approach: anterolateral  Medications administered: 6 mg betamethasone acetate-betamethasone sodium phosphate 6 (3-3) mg/mL; 2 mL lidocaine 1 %    Patient tolerance: patient tolerated the procedure well with no immediate complications  Dressing:  Sterile dressing applied  Large joint arthrocentesis  Date/Time: 7/27/2018 1:55 PM  Consent given by: patient  Timeout: Immediately prior to procedure a time out was called to verify the correct patient, procedure, equipment, support staff and site/side marked as required   Supporting Documentation  Indications: pain   Procedure Details  Location: knee - L knee  Preparation: Patient was prepped and draped in the usual sterile fashion  Needle size: 22 G  Ultrasound guidance: no  Approach: anterolateral  Medications administered: 6 mg betamethasone acetate-betamethasone sodium phosphate 6 (3-3) mg/mL; 2 mL lidocaine 1 %    Patient tolerance: patient tolerated the procedure well with no immediate complications  Dressing:  Sterile dressing applied      Ravindra Hammer, PA-C  Portions of the record may have been created with voice recognition software   Occasional wrong word or "sound a like" substitutions may have occurred due to the inherent limitations of voice recognition software

## 2018-08-07 ENCOUNTER — TELEPHONE (OUTPATIENT)
Dept: OBGYN CLINIC | Facility: HOSPITAL | Age: 83
End: 2018-08-07

## 2018-08-07 NOTE — TELEPHONE ENCOUNTER
Left message for patient to call back and schedule appt with Dr Ricco Borjas for bilateral knee injection (synvisc-one) buy&bill

## 2018-08-15 ENCOUNTER — TELEPHONE (OUTPATIENT)
Dept: FAMILY MEDICINE CLINIC | Facility: CLINIC | Age: 83
End: 2018-08-15

## 2018-08-15 DIAGNOSIS — I10 ESSENTIAL HYPERTENSION: Primary | ICD-10-CM

## 2018-08-15 RX ORDER — OLMESARTAN MEDOXOMIL 5 MG/1
5 TABLET ORAL DAILY
Qty: 90 TABLET | Refills: 3 | Status: SHIPPED | OUTPATIENT
Start: 2018-08-15 | End: 2019-11-15

## 2018-08-17 DIAGNOSIS — M54.9 BACK PAIN, UNSPECIFIED BACK LOCATION, UNSPECIFIED BACK PAIN LATERALITY, UNSPECIFIED CHRONICITY: ICD-10-CM

## 2018-08-17 RX ORDER — LIDOCAINE 50 MG/G
1 PATCH TOPICAL DAILY
Qty: 30 PATCH | Refills: 5 | Status: SHIPPED | OUTPATIENT
Start: 2018-08-17 | End: 2018-10-18 | Stop reason: SDUPTHER

## 2018-08-22 ENCOUNTER — OFFICE VISIT (OUTPATIENT)
Dept: OBGYN CLINIC | Facility: CLINIC | Age: 83
End: 2018-08-22
Payer: MEDICARE

## 2018-08-22 VITALS
HEART RATE: 89 BPM | RESPIRATION RATE: 14 BRPM | DIASTOLIC BLOOD PRESSURE: 75 MMHG | WEIGHT: 153.2 LBS | BODY MASS INDEX: 28.92 KG/M2 | HEIGHT: 61 IN | SYSTOLIC BLOOD PRESSURE: 113 MMHG

## 2018-08-22 DIAGNOSIS — M17.0 PRIMARY OSTEOARTHRITIS OF BOTH KNEES: Primary | ICD-10-CM

## 2018-08-22 PROCEDURE — 20610 DRAIN/INJ JOINT/BURSA W/O US: CPT | Performed by: PHYSICIAN ASSISTANT

## 2018-08-22 NOTE — PROGRESS NOTES
80 y o  female presents for Synvisc-One injection to the bilateral knees  She reports good improvement with the cortisone injections that she received 4 weeks ago  She denies any ill affects of those injections  She denies any allergies chicken  Review of Systems  Review of systems negative unless otherwise specified in HPI    Past Medical History  Past Medical History:   Diagnosis Date    Abnormal electrocardiogram     Last Assessed: 21mar2013    Arthritis     Atrial fibrillation ([address] )     Last Assessed: [id number]    Complete atrioventricular block [facility])     Last Assessed: 21Mar2013    Diverticulosis     Last Assessed: [id number]    GERD (gastroesophageal reflux disease)     Last Assessed: 21Mar2013    Glaucoma     Hiatal hernia     Hypertension     Resolved: [id number]    Intermittent claudication [facility])     Last Assessed: 23Jan2015    Macular degeneration     Last Assessed: 21Mar2013    Pericardial effusion     Pulmonary embolism (H[facility])      Past Surgical History  Past Surgical History:   Procedure Laterality Date    APPENDECTOMY      HYSTERECTOMY      TONSILLECTOMY AND ADENOIDECTOMY       Current Medications  Current Outpatient Prescriptions on File Prior to Visit   Medication Sig Dispense Refill    acetaminophen (TYLENOL) 500 mg tablet Take 1,000 mg by mouth      ALPRAZolam (XANAX) 0 25 mg tablet Take 1 tablet (0 25 mg total) by mouth every 12 (twelve) hours as needed for anxiety 60 tablet 5    amoxicillin (AMOXIL) 500 mg capsule       calcium-vitamin D 250-100 MG-UNIT per tablet Take 1 tablet by mouth 2 (two) times a day      diazepam (VALIUM) 2 mg tablet Take 1 tablet by mouth 2 (two) times a day as needed      diclofenac sodium (VOLTAREN) 1 % Place on the skin      escitalopram (LEXAPRO) 5 mg tablet TAKE 1 TABLET DAILY   30 tablet 5    esomeprazole (NexIUM) 40 MG capsule Take 40 mg by mouth      esomeprazole (NexIUM) 40 MG capsule Take 1 capsule (40 mg total) by mouth daily 30 capsule 5    fluticasone (FLONASE) 50 mcg/act nasal spray 2 sprays into each nostril      furosemide (LASIX) 20 mg tablet Take 40 mg by mouth daily        furosemide (LASIX) 40 mg tablet Take 1 tablet (40 mg total) by mouth daily 30 tablet 5    hydrocortisone (ANUSOL-HC) 2 5 % rectal cream Insert 1 application into the rectum Three times a day      hydrocortisone (PROCTOZONE-HC) 2 5 % rectal cream Insert into the rectum      lidocaine (LIDODERM) 5 % Apply 1 patch topically      lidocaine (LIDODERM) 5 % Place 1 patch on the skin daily Remove & Discard patch within 12 hours or as directed by MD 30 patch 5    Melatonin 10 MG TABS Take 3 mg by mouth        methocarbamol (ROBAXIN) 500 mg tablet Take 1 tablet by mouth 3 (three) times a day as needed      mirtazapine (REMERON) 15 mg tablet Take 1 tablet (15 mg total) by mouth daily at bedtime 30 tablet 5    montelukast (SINGULAIR) 10 mg tablet Take 1 tablet (10 mg total) by mouth daily 30 tablet 5    multivitamin (THERAGRAN) TABS Take 1 tablet by mouth daily      olmesartan (BENICAR) 5 mg tablet Take 1 tablet (5 mg total) by mouth daily 90 tablet 3    potassium chloride (K-DUR,KLOR-CON) 20 mEq tablet Take 1 tablet (20 mEq total) by mouth daily 30 tablet 5    Potassium Chloride ER 20 MEQ TBCR Take by mouth daily      rivaroxaban (XARELTO) 15 mg tablet Take by mouth      senna (SENOKOT) 8 6 mg Take 1 tablet (8 6 mg total) by mouth daily at bedtime 120 each 5    senna-docusate sodium (SENOKOT-S) 8 6-50 mg per tablet Take 1 tablet by mouth      triamcinolone (KENALOG) 0 1 % ointment APPLY TWICE A DAY TO AFFECTED AREA X1-2 WEEKS  0    XARELTO 20 MG tablet Take 20 mg by mouth daily  11     No current facility-administered medications on file prior to visit        Recent Labs [facility])    0  Lab Value Date/Time   HCT 39 1 04/12/2016 1427   HCT 43 6 05/15/2015 1922   HGB 12 7 04/12/2016 1427   HGB 14 1 05/15/2015 1922   WBC 7 08 04/12/2016 1427   WBC 6 55 05/15/2015 1922   INR 2 54 (H) 03/01/2016 1418   INR 3 00 (H) 12/15/2015 1414   GLUCOSE 90 04/12/2016 1427   GLUCOSE 82 05/15/2015 1922     Physical exam   General:  Alert and oriented in no acute distress  HEENT:  Sclera white conjunctivae pink and moist     Lungs:  Regular respirations without wheeze   Cardiac: Regular rate  Bilateral knees:  No effusion no erythema warmth or signs of infection  She is able get full extension  Flexion 120  No gross ligamentous laxity  Positive medial and patellofemoral joint pain bilaterally  Imaging    None    Assessment: bilateral knee arthritis      Large joint arthrocentesis  Date/Time: 8/22/2018 1:21 PM  Consent given by: patient  Timeout: Immediately prior to procedure a time out was called to verify the correct patient, procedure, equipment, support staff and site/side marked as required   Supporting Documentation  Indications: pain   Procedure Details  Location: knee - R knee  Preparation: Patient was prepped and draped in the usual sterile fashion  Needle size: 22 G  Ultrasound guidance: no  Approach: superior  Medications administered: 48 mg hylan 48 MG/6ML    Patient tolerance: patient tolerated the procedure well with no immediate complications  Dressing:  Sterile dressing applied  Large joint arthrocentesis  Date/Time: 8/22/2018 1:21 PM  Consent given by: patient  Timeout: Immediately prior to procedure a time out was called to verify the correct patient, procedure, equipment, support staff and site/side marked as required   Supporting Documentation  Indications: pain   Procedure Details  Location: knee - L knee  Preparation: Patient was prepped and draped in the usual sterile fashion  Needle size: 22 G  Ultrasound guidance: no  Approach: superior  Medications administered: 48 mg hylan 48 MG/6ML    Patient tolerance: patient tolerated the procedure well with no immediate complications  Dressing:  Sterile dressing applied    Plan: Patient will ice bilateral knee for 20 min 1-2 times today  Limit activity for next 24 hr before resuming normal activities  Weightbearing as tolerated  Patient may use over-the-counter ibuprofen or Tylenol as needed for discomfort  Follow up in  4 months for recheck and decision for repeat injection or sooner on an as-needed basis

## 2018-08-22 NOTE — PATIENT INSTRUCTIONS
Patient will ice bilateral knee for 20 min 1-2 times today  Limit activity for next 24 hr before resuming normal activities  Weightbearing as tolerated  Patient may use over-the-counter ibuprofen or Tylenol as needed for discomfort  Follow up in  4 months for recheck and decision for repeat injection or sooner on an as-needed basis

## 2018-09-17 ENCOUNTER — TELEPHONE (OUTPATIENT)
Dept: FAMILY MEDICINE CLINIC | Facility: CLINIC | Age: 83
End: 2018-09-17

## 2018-09-17 DIAGNOSIS — J32.9 SINUSITIS, UNSPECIFIED CHRONICITY, UNSPECIFIED LOCATION: Primary | ICD-10-CM

## 2018-09-17 RX ORDER — AMOXICILLIN 500 MG/1
500 CAPSULE ORAL EVERY 8 HOURS SCHEDULED
Qty: 21 CAPSULE | Refills: 0 | Status: SHIPPED | OUTPATIENT
Start: 2018-09-17 | End: 2018-09-24

## 2018-09-24 ENCOUNTER — TELEPHONE (OUTPATIENT)
Dept: FAMILY MEDICINE CLINIC | Facility: CLINIC | Age: 83
End: 2018-09-24

## 2018-10-18 DIAGNOSIS — M54.9 BACK PAIN, UNSPECIFIED BACK LOCATION, UNSPECIFIED BACK PAIN LATERALITY, UNSPECIFIED CHRONICITY: ICD-10-CM

## 2018-10-18 RX ORDER — FLUTICASONE PROPIONATE 50 MCG
2 SPRAY, SUSPENSION (ML) NASAL DAILY
Qty: 16 G | Refills: 5 | Status: SHIPPED | OUTPATIENT
Start: 2018-10-18 | End: 2019-08-19 | Stop reason: SDUPTHER

## 2018-10-18 RX ORDER — LIDOCAINE 50 MG/G
1 PATCH TOPICAL DAILY
Qty: 30 PATCH | Refills: 5 | Status: SHIPPED | OUTPATIENT
Start: 2018-10-18 | End: 2019-03-22 | Stop reason: SDUPTHER

## 2018-11-12 ENCOUNTER — OFFICE VISIT (OUTPATIENT)
Dept: FAMILY MEDICINE CLINIC | Facility: CLINIC | Age: 83
End: 2018-11-12
Payer: MEDICARE

## 2018-11-12 DIAGNOSIS — I10 ESSENTIAL HYPERTENSION: ICD-10-CM

## 2018-11-12 DIAGNOSIS — Z00.00 MEDICARE ANNUAL WELLNESS VISIT, SUBSEQUENT: ICD-10-CM

## 2018-11-12 DIAGNOSIS — M19.90 ARTHRITIS: ICD-10-CM

## 2018-11-12 DIAGNOSIS — Z23 ENCOUNTER FOR IMMUNIZATION: Primary | ICD-10-CM

## 2018-11-12 PROCEDURE — G0008 ADMIN INFLUENZA VIRUS VAC: HCPCS

## 2018-11-12 PROCEDURE — G0439 PPPS, SUBSEQ VISIT: HCPCS | Performed by: FAMILY MEDICINE

## 2018-11-12 PROCEDURE — 90662 IIV NO PRSV INCREASED AG IM: CPT

## 2018-11-12 NOTE — PROGRESS NOTES
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 ([facility])    Closed 4-part fracture of proximal end of left humerus with routine healing    Closed fracture of maxillary sinus ([facility])    Cough    Deep venous thrombosis of distal lower extremity ([facility])    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, unspecified laterality    Depressed    Diaphragmatic hernia without obstruction or gangrene    Diastolic dysfunction    Edema    Enteritis    Fatigue    Fracture of femoral neck, left ([facility])    Fracture of left humerus    Gait abnormality    Heart block    Hemorrhage of anus and rectum    Hip fracture ([facility])    Hip pain, acute, unspecified laterality    History of DVT (deep vein thrombosis)    Hx pulmonary embolism    Hyperlipidemia    Hypertension    Joint pain, hip    Leg swelling    Memory impairment    Osteoarthritis    Osteoarthritis of knee    Osteoporosis    Pacemaker    PHN (postherpetic neuralgia)    S/P hip hemiarthroplasty    SAH (subarachnoid hemorrhage) (HCC)    Sciatica    SDH (subdural hematoma) ([facility])    Seborrheic keratosis    Status post reverse total replacement of left shoulder    Subarachnoid hemorrhage following injury, with loss of consciousness ([facility])    Trochanteric bursitis of left hip    Vertigo    Constipation    Lymphedema    Chronic pain of both knees     Past Medical History:   Diagnosis Date    Abnormal electrocardiogram     Last Assessed: [id number]    Arthritis     Atrial fibrillation ([facility] [address] )     Last Assessed: [id number]    Complete atrioventricular block ([address] )     Last Assessed: 21Mar2013    Diverticulosis     Last Assessed: [id number]    GERD (gastroesophageal reflux disease)     Last Assessed: 21Mar2013    Glaucoma     Hiatal hernia     Hypertension     Resolved: [id number]    Intermittent claudication [facility])     Last Assessed: [id number]    Macular degeneration     Last Assessed: 21Mar2013    Pericardial effusion     Pulmonary embolism (HCC)      Past Surgical History:   Procedure Laterality Date    APPENDECTOMY      HYSTERECTOMY      TONSILLECTOMY AND ADENOIDECTOMY       Family History   Problem Relation Age of Onset    Hypertension Mother     Diabetes Father     Heart disease Father     Hypertension Father     Alzheimer's disease Sister     Colon cancer Sister      History   Smoking Status    Never Smoker   Smokeless Tobacco    Never Used     History   Alcohol Use No      History   Drug Use No         Current Outpatient Prescriptions   Medication Sig Dispense Refill    acetaminophen (TYLENOL) 500 mg tablet Take 1,000 mg by mouth      ALPRAZolam (XANAX) 0 25 mg tablet Take 1 tablet (0 25 mg total) by mouth every 12 (twelve) hours as needed for anxiety 60 tablet 5    amoxicillin (AMOXIL) 500 mg capsule       calcium-vitamin D 250-100 MG-UNIT per tablet Take 1 tablet by mouth 2 (two) times a day      diazepam (VALIUM) 2 mg tablet Take 1 tablet by mouth 2 (two) times a day as needed      diclofenac sodium (VOLTAREN) 1 % Place on the skin      escitalopram (LEXAPRO) 5 mg tablet TAKE 1 TABLET DAILY   30 tablet 5    esomeprazole (NexIUM) 40 MG capsule Take 40 mg by mouth      esomeprazole (NexIUM) 40 MG capsule Take 1 capsule (40 mg total) by mouth daily 30 capsule 5    fluticasone (FLONASE) 50 mcg/act nasal spray 2 sprays into each nostril daily 16 g 5    furosemide (LASIX) 20 mg tablet Take 40 mg by mouth daily        furosemide (LASIX) 40 mg tablet Take 1 tablet (40 mg total) by mouth daily 30 tablet 5    hydrocortisone (ANUSOL-HC) 2 5 % rectal cream Insert 1 application into the rectum Three times a day      hydrocortisone (PROCTOZONE-HC) 2 5 % rectal cream Insert into the rectum      lidocaine (LIDODERM) 5 % Apply 1 patch topically      lidocaine (LIDODERM) 5 % Apply 1 patch topically daily Remove & Discard patch within 12 hours or as directed by MD 30 patch 5    Melatonin 10 MG TABS Take 3 mg by mouth        methocarbamol (ROBAXIN) 500 mg tablet Take 1 tablet by mouth 3 (three) times a day as needed      mirtazapine (REMERON) 15 mg tablet Take 1 tablet (15 mg total) by mouth daily at bedtime 30 tablet 5    montelukast (SINGULAIR) 10 mg tablet Take 1 tablet (10 mg total) by mouth daily 30 tablet 5    multivitamin (THERAGRAN) TABS Take 1 tablet by mouth daily      olmesartan (BENICAR) 5 mg tablet Take 1 tablet (5 mg total) by mouth daily 90 tablet 3    potassium chloride (K-DUR,KLOR-CON) 20 mEq tablet Take 1 tablet (20 mEq total) by mouth daily 30 tablet 5    Potassium Chloride ER 20 MEQ TBCR Take by mouth daily      rivaroxaban (XARELTO) 15 mg tablet Take by mouth      senna (SENOKOT) 8 6 mg Take 1 tablet (8 6 mg total) by mouth daily at bedtime 120 each 5    senna-docusate sodium (SENOKOT-S) 8 6-50 mg per tablet Take 1 tablet by mouth      triamcinolone (KENALOG) 0 1 % ointment APPLY TWICE A DAY TO AFFECTED AREA X1-2 WEEKS  0    XARELTO 20 MG tablet Take 20 mg by mouth daily  11     No current facility-administered medications for this visit  Allergies   Allergen Reactions    Codeine Other (See Comments)     Like I was floating  Felt dizzy   Took codeine many years ago     Immunization History   Administered Date(s) Administered     Influenza (IM) Preservative Free 09/26/2014    Influenza 10/01/2012, 09/26/2014, 09/29/2016, 10/17/2017    Influenza Split High Dose Preservative Free IM 09/29/2016, 10/17/2017    Influenza TIV (IM) 12/25/2012    Pneumococcal Conjugate 13-Valent 12/05/2015, 12/15/2015    Pneumococcal Polysaccharide PPV23 11/20/2012    Tdap 10/27/2017    Zoster 01/01/2013, 11/15/2013       Patient Care Team:  Carlos Perea MD as PCP - General    Medicare Screening Tests and Risk Assessments:  Raymundo Lei is here for her Subsequent Wellness visit  Last Medicare Wellness visit information reviewed, patient interviewed, no change since last AWV  Health Risk Assessment:  Patient rates overall health as fair  Patient feels that their physical health rating is Same  Eyesight was rated as Same  Hearing was rated as Slightly worse  Patient feels that their emotional and mental health rating is Much better  Pain experienced by patient in the last 7 days has been A lot  Patient's pain rating has been 8/10  Patient states that she has experienced no weight loss or gain in last 6 months  Emotional/Mental Health:  Patient has been feeling nervous/anxious  PHQ-9 Depression Screening:    Frequency of the following problems over the past two weeks:      1  Little interest or pleasure in doing things: 0 - not at all      2  Feeling down, depressed, or hopeless: 0 - not at all  PHQ-2 Score: 0          Broken Bones/Falls: Fall Risk Assessment:    In the past year, patient has experienced: No history of falling in past year          Bladder/Bowel:  Patient has not leaked urine accidently in the last six months  Patient reports no loss of bowel control  Immunizations:  Patient has not had a flu vaccination within the last year  Patient has received a shingles shot  Home Safety:  Patient has trouble with stairs inside or outside of their home  Patient currently reports that there are no safety hazards present in home, working smoke alarms, working carbon monoxide detectors        Preventative Screenings:   glaucoma eye exam completed,     Nutrition:  Current diet: Low Cholesterol and No Added Salt with servings of the following:    Medications:  Patient is able to manage medications  Lifestyle Choices:  Patient reports no tobacco use  Patient has not smoked or used tobacco in the past   Patient reports no alcohol use  Patient does not drive a vehicle  Patient wears seat belt  Current level of exercise of physical activity described by patient as: minimal         Activities of Daily Living:  Can get out of bed by his or her self, able to dress self, able to make own meals, unable to do own shopping, able to bathe self, can do own laundry/housekeeping, can manage own money, pay bills and track expenses    Previous Hospitalizations:  No hospitalization or ED visit in past 12 months        Advanced Directives:  Patient has decided on a power of   Patient has spoken to designated power of   Patient has completed advanced directive    Additional Comments: [address]    [phone]    Preventative Screening/Counseling:      Cardiovascular:      General: Risks and Benefits Discussed and Screening Current          Diabetes:      General: Risks and Benefits Discussed and Screening Current          Colorectal Cancer:      General: Risks and Benefits Discussed and Screening Not Indicated          Breast Cancer:      General: Risks and Benefits Discussed and Screening Not Indicated          Cervical Cancer:      General: Risks and Benefits Discussed and Screening Not Indicated          Osteoporosis:      General: Risks and Benefits Discussed and Screening Not Indicated          AAA:      General: Risks and Benefits Discussed and Screening Not Indicated          Glaucoma:      General: Risks and Benefits Discussed and Screening Current          HIV:      General: Risks and Benefits Discussed and Screening Not Indicated          Hepatitis C:      General: Risks and Benefits Discussed and Screening Not Indicated        Advanced Directives:   Patient has living will for healthcare, has durable POA for healthcare, patient has an advanced directive  Information on ACP and/or AD provided  5 wishes given  End of life assessment reviewed with patient  Provider agrees with end of life decisions   Immunizations:  Patient reviewed and up to date      Influenza: Influenza UTD This Year and Influenza Recommended Annually      Pneumococcal: Lifetime Vaccine Completed      Shingrix: Risks & Benefits Discussed and Shingrix Vaccine Needed Today      Hepatitis B (Low risk patients): Series Not Indicated      Zostavax: Zostavax Vaccine UTD      TD: Td Vaccine UTD      TDAP: Tdap Vaccine UTD            No exam data present    Physical Exam:  Review of Systems   Gastrointestinal: Negative for bowel incontinence  Psychiatric/Behavioral: The patient is nervous/anxious  All other systems reviewed and are negative  There were no vitals filed for this visit  There is no height or weight on file to calculate BMI  Physical Exam   Constitutional: She is oriented to person, place, and time  She appears well-developed and well-nourished  Neck: Normal range of motion  Neck supple  Cardiovascular: Normal rate, regular rhythm, normal heart sounds and intact distal pulses  Pulmonary/Chest: Effort normal and breath sounds normal    Abdominal: Soft  Bowel sounds are normal    Musculoskeletal: Normal range of motion  Neurological: She is alert and oriented to person, place, and time  Skin: Skin is warm and dry  Psychiatric: She has a normal mood and affect  Her behavior is normal  Judgment and thought content normal    Nursing note and vitals reviewed

## 2019-01-08 DIAGNOSIS — F32.89 OTHER DEPRESSION: ICD-10-CM

## 2019-01-08 DIAGNOSIS — K21.9 GASTROESOPHAGEAL REFLUX DISEASE, ESOPHAGITIS PRESENCE NOT SPECIFIED: ICD-10-CM

## 2019-01-08 DIAGNOSIS — F41.9 ANXIETY: ICD-10-CM

## 2019-01-08 DIAGNOSIS — J45.909 ASTHMA DUE TO ENVIRONMENTAL ALLERGIES: Primary | ICD-10-CM

## 2019-01-08 RX ORDER — ESCITALOPRAM OXALATE 5 MG/1
5 TABLET ORAL DAILY
Qty: 30 TABLET | Refills: 5 | Status: SHIPPED | OUTPATIENT
Start: 2019-01-08 | End: 2019-11-08 | Stop reason: SDUPTHER

## 2019-01-08 RX ORDER — MONTELUKAST SODIUM 10 MG/1
10 TABLET ORAL DAILY
Qty: 30 TABLET | Refills: 5 | Status: SHIPPED | OUTPATIENT
Start: 2019-01-08 | End: 2019-07-09 | Stop reason: SDUPTHER

## 2019-01-08 RX ORDER — MIRTAZAPINE 15 MG/1
15 TABLET, FILM COATED ORAL
Qty: 30 TABLET | Refills: 0 | Status: SHIPPED | OUTPATIENT
Start: 2019-01-08 | End: 2019-02-08 | Stop reason: SDUPTHER

## 2019-01-08 RX ORDER — ESOMEPRAZOLE MAGNESIUM 40 MG/1
40 CAPSULE, DELAYED RELEASE ORAL DAILY
Qty: 30 CAPSULE | Refills: 5 | Status: SHIPPED | OUTPATIENT
Start: 2019-01-08 | End: 2019-07-09 | Stop reason: SDUPTHER

## 2019-02-08 DIAGNOSIS — F32.89 OTHER DEPRESSION: ICD-10-CM

## 2019-02-08 DIAGNOSIS — R60.0 LOCALIZED EDEMA: ICD-10-CM

## 2019-02-08 RX ORDER — MIRTAZAPINE 15 MG/1
15 TABLET, FILM COATED ORAL
Qty: 90 TABLET | Refills: 3 | Status: SHIPPED | OUTPATIENT
Start: 2019-02-08 | End: 2020-01-08 | Stop reason: SDUPTHER

## 2019-02-08 RX ORDER — FUROSEMIDE 40 MG/1
40 TABLET ORAL DAILY
Qty: 30 TABLET | Refills: 5 | Status: SHIPPED | OUTPATIENT
Start: 2019-02-08 | End: 2019-09-09 | Stop reason: SDUPTHER

## 2019-02-11 RX ORDER — POTASSIUM CHLORIDE 20 MEQ/1
20 TABLET, EXTENDED RELEASE ORAL DAILY
Qty: 30 TABLET | Refills: 5 | Status: SHIPPED | OUTPATIENT
Start: 2019-02-11 | End: 2019-11-08 | Stop reason: SDUPTHER

## 2019-03-22 DIAGNOSIS — M54.9 BACK PAIN, UNSPECIFIED BACK LOCATION, UNSPECIFIED BACK PAIN LATERALITY, UNSPECIFIED CHRONICITY: ICD-10-CM

## 2019-03-22 RX ORDER — LIDOCAINE 50 MG/G
1 PATCH TOPICAL DAILY
Qty: 30 PATCH | Refills: 5 | Status: SHIPPED | OUTPATIENT
Start: 2019-03-22 | End: 2019-06-24 | Stop reason: SDUPTHER

## 2019-05-14 ENCOUNTER — OFFICE VISIT (OUTPATIENT)
Dept: FAMILY MEDICINE CLINIC | Facility: CLINIC | Age: 84
End: 2019-05-14
Payer: MEDICARE

## 2019-05-14 VITALS
DIASTOLIC BLOOD PRESSURE: 72 MMHG | BODY MASS INDEX: 29.87 KG/M2 | OXYGEN SATURATION: 95 % | HEART RATE: 84 BPM | HEIGHT: 61 IN | RESPIRATION RATE: 16 BRPM | SYSTOLIC BLOOD PRESSURE: 122 MMHG | WEIGHT: 158.2 LBS

## 2019-05-14 DIAGNOSIS — S06.5X9A SDH (SUBDURAL HEMATOMA) (HCC): ICD-10-CM

## 2019-05-14 DIAGNOSIS — I48.91 ATRIAL FIBRILLATION, UNSPECIFIED TYPE (HCC): ICD-10-CM

## 2019-05-14 DIAGNOSIS — I10 ESSENTIAL HYPERTENSION: ICD-10-CM

## 2019-05-14 DIAGNOSIS — I82.4Z9 DEEP VEIN THROMBOSIS (DVT) OF DISTAL VEIN OF LOWER EXTREMITY, UNSPECIFIED CHRONICITY, UNSPECIFIED LATERALITY (HCC): ICD-10-CM

## 2019-05-14 DIAGNOSIS — I45.9 HEART BLOCK: Primary | ICD-10-CM

## 2019-05-14 DIAGNOSIS — R10.84 GENERALIZED ABDOMINAL PAIN: ICD-10-CM

## 2019-05-14 PROCEDURE — 99213 OFFICE O/P EST LOW 20 MIN: CPT | Performed by: FAMILY MEDICINE

## 2019-06-24 DIAGNOSIS — M54.9 BACK PAIN, UNSPECIFIED BACK LOCATION, UNSPECIFIED BACK PAIN LATERALITY, UNSPECIFIED CHRONICITY: ICD-10-CM

## 2019-06-24 RX ORDER — LIDOCAINE 50 MG/G
1 PATCH TOPICAL DAILY
Qty: 30 PATCH | Refills: 5 | Status: SHIPPED | OUTPATIENT
Start: 2019-06-24 | End: 2019-11-15

## 2019-07-09 DIAGNOSIS — K21.9 GASTROESOPHAGEAL REFLUX DISEASE, ESOPHAGITIS PRESENCE NOT SPECIFIED: ICD-10-CM

## 2019-07-09 DIAGNOSIS — J45.909 ASTHMA DUE TO ENVIRONMENTAL ALLERGIES: ICD-10-CM

## 2019-07-10 RX ORDER — ESOMEPRAZOLE MAGNESIUM 40 MG/1
40 CAPSULE, DELAYED RELEASE ORAL DAILY
Qty: 30 CAPSULE | Refills: 5 | Status: SHIPPED | OUTPATIENT
Start: 2019-07-10 | End: 2020-01-08 | Stop reason: SDUPTHER

## 2019-07-10 RX ORDER — MONTELUKAST SODIUM 10 MG/1
10 TABLET ORAL DAILY
Qty: 30 TABLET | Refills: 5 | Status: SHIPPED | OUTPATIENT
Start: 2019-07-10 | End: 2020-01-08 | Stop reason: SDUPTHER

## 2019-08-19 DIAGNOSIS — M54.9 BACK PAIN, UNSPECIFIED BACK LOCATION, UNSPECIFIED BACK PAIN LATERALITY, UNSPECIFIED CHRONICITY: ICD-10-CM

## 2019-08-19 RX ORDER — FLUTICASONE PROPIONATE 50 MCG
2 SPRAY, SUSPENSION (ML) NASAL DAILY
Qty: 16 G | Refills: 5 | Status: SHIPPED | OUTPATIENT
Start: 2019-08-19 | End: 2020-07-09 | Stop reason: SDUPTHER

## 2019-09-09 DIAGNOSIS — J01.00 ACUTE NON-RECURRENT MAXILLARY SINUSITIS: ICD-10-CM

## 2019-09-09 DIAGNOSIS — R60.0 LOCALIZED EDEMA: ICD-10-CM

## 2019-09-09 DIAGNOSIS — K59.00 CONSTIPATION, UNSPECIFIED CONSTIPATION TYPE: Primary | ICD-10-CM

## 2019-09-09 RX ORDER — AMOXICILLIN 500 MG/1
500 CAPSULE ORAL EVERY 8 HOURS SCHEDULED
Qty: 30 CAPSULE | Refills: 1 | Status: SHIPPED | OUTPATIENT
Start: 2019-09-09 | End: 2019-09-19

## 2019-09-09 RX ORDER — SENNA AND DOCUSATE SODIUM 50; 8.6 MG/1; MG/1
1 TABLET, FILM COATED ORAL DAILY PRN
Qty: 30 TABLET | Refills: 5 | Status: SHIPPED | OUTPATIENT
Start: 2019-09-09

## 2019-09-09 RX ORDER — FUROSEMIDE 40 MG/1
40 TABLET ORAL DAILY
Qty: 30 TABLET | Refills: 5 | Status: SHIPPED | OUTPATIENT
Start: 2019-09-09 | End: 2020-03-11

## 2019-10-09 DIAGNOSIS — F41.9 ANXIETY: ICD-10-CM

## 2019-10-09 RX ORDER — ALPRAZOLAM 0.25 MG/1
0.25 TABLET ORAL EVERY 12 HOURS PRN
Qty: 60 TABLET | Refills: 5 | Status: SHIPPED | OUTPATIENT
Start: 2019-10-09 | End: 2020-09-10 | Stop reason: SDUPTHER

## 2019-11-08 DIAGNOSIS — F41.9 ANXIETY: ICD-10-CM

## 2019-11-08 DIAGNOSIS — R60.0 LOCALIZED EDEMA: ICD-10-CM

## 2019-11-08 RX ORDER — ESCITALOPRAM OXALATE 5 MG/1
5 TABLET ORAL DAILY
Qty: 30 TABLET | Refills: 5 | Status: SHIPPED | OUTPATIENT
Start: 2019-11-08 | End: 2020-05-07

## 2019-11-08 RX ORDER — POTASSIUM CHLORIDE 20 MEQ/1
20 TABLET, EXTENDED RELEASE ORAL DAILY
Qty: 30 TABLET | Refills: 5 | Status: SHIPPED | OUTPATIENT
Start: 2019-11-08 | End: 2020-07-09 | Stop reason: SDUPTHER

## 2019-11-15 ENCOUNTER — OFFICE VISIT (OUTPATIENT)
Dept: FAMILY MEDICINE CLINIC | Facility: CLINIC | Age: 84
End: 2019-11-15
Payer: MEDICARE

## 2019-11-15 VITALS
SYSTOLIC BLOOD PRESSURE: 124 MMHG | OXYGEN SATURATION: 99 % | BODY MASS INDEX: 29.27 KG/M2 | HEIGHT: 61 IN | HEART RATE: 84 BPM | WEIGHT: 155 LBS | DIASTOLIC BLOOD PRESSURE: 74 MMHG

## 2019-11-15 DIAGNOSIS — I10 ESSENTIAL HYPERTENSION: ICD-10-CM

## 2019-11-15 DIAGNOSIS — Z23 NEED FOR IMMUNIZATION AGAINST INFLUENZA: Primary | ICD-10-CM

## 2019-11-15 DIAGNOSIS — I48.91 ATRIAL FIBRILLATION, UNSPECIFIED TYPE (HCC): Primary | ICD-10-CM

## 2019-11-15 DIAGNOSIS — I45.9 HEART BLOCK: ICD-10-CM

## 2019-11-15 DIAGNOSIS — I10 BENIGN ESSENTIAL HYPERTENSION: ICD-10-CM

## 2019-11-15 PROCEDURE — 99214 OFFICE O/P EST MOD 30 MIN: CPT | Performed by: FAMILY MEDICINE

## 2019-11-15 PROCEDURE — 90662 IIV NO PRSV INCREASED AG IM: CPT

## 2019-11-15 PROCEDURE — G0008 ADMIN INFLUENZA VIRUS VAC: HCPCS

## 2019-11-15 NOTE — PROGRESS NOTES
Assessment/Plan:    Atrial fibrillation (HCC)  Stable  Continue current  Benign essential hypertension  Stable  Continue current  Heart block  Stable  Continue current  Hypertension  Stable  Continue current  Diagnoses and all orders for this visit:    Atrial fibrillation, unspecified type ([facility] [address] )    Benign essential hypertension    Heart block    Essential hypertension          Subjective:      Patient ID: Kingsley Le is a 80 y o  female  She is doing well overall  Her BP is at goal   She has no CP or SOB  She has no HA or vision changes  Her anxiety is fairly well controlled  She has no HI/SI  She has no side effects from her current regimen  She is anticoagulated for presumed Afib and DVT/PE  She has no bleeding or side effects  The following portions of the patient's history were reviewed and updated as appropriate:   She  has a past medical history of Abnormal electrocardiogram, Arthritis, Atrial fibrillation ([facility]), Complete atrioventricular block ([address] ), Diverticulosis, GERD (gastroesophageal reflux disease), Glaucoma, Hiatal hernia, Hypertension, Intermittent claudication ([address] ), Macular degeneration, Pericardial effusion, and Pulmonary embolism ([facility] [address] )    She   Patient Active Problem List    Diagnosis Date Noted    Generalized abdominal pain 05/14/2019    Medicare annual wellness visit, subsequent 11/12/2018    Chronic pain of both knees 07/12/2018    Constipation 02/02/2018    Lymphedema 02/02/2018    Subarachnoid hemorrhage following injury, with loss of consciousness ([facility] [address] ) 11/21/2017    Closed 4-part fracture of proximal end of left humerus with routine healing 10/27/2017    Depressed 10/27/2017    Fracture of femoral neck, left ([address] ) 10/27/2017    Fracture of left humerus 10/27/2017    Gait abnormality 10/27/2017    Pacemaker 10/27/2017    S/P hip hemiarthroplasty 10/27/2017    SAH (subarachnoid hemorrhage) (HCC) 10/27/2017    SDH (subdural hematoma) ([facility] ) 10/27/2017    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    Memory impairment 10/25/2017    Atrial fibrillation (HCC) 10/20/2017    Closed fracture of maxillary sinus ([facility]) 10/20/2017    History of DVT (deep vein thrombosis) 10/20/2017    Hx pulmonary embolism 10/20/2017    Hip fracture ([facility]) 10/19/2017    PHN (postherpetic neuralgia) 02/23/2017    Cough 08/22/2016    Hemorrhage of anus and rectum 06/30/2016    Chronic GERD 06/15/2016    Ambulatory dysfunction 04/12/2016    Joint pain, hip 01/04/2016    Trochanteric bursitis of left hip 01/04/2016    Enteritis 12/15/2015    Diaphragmatic hernia without obstruction or gangrene 06/01/2015    Seborrheic keratosis 01/23/2015    Hypertension 28/77/0068    Diastolic dysfunction 39/84/7619    Fatigue 07/15/2014    Edema 06/30/2014    Anemia 11/14/2013    Heart block 11/14/2013    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 ([facility]) 09/10/2013    Sciatica 09/10/2013    Backache 08/30/2013    Vertigo 08/08/2013    Leg swelling 08/01/2013    Hip pain, acute, unspecified laterality 01/11/2013    Deep venous thrombosis of distal lower extremity ([facility] ) 11/20/2012    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, unspecified laterality 11/20/2012    Osteoarthritis of knee 11/20/2012    Osteoporosis 11/20/2012    Anxiety 11/16/2012    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    Benign essential hypertension 11/16/2012    Hyperlipidemia 11/16/2012    Osteoarthritis 11/16/2012     She  has a past surgical history that includes Appendectomy; Hysterectomy; and Tonsillectomy and adenoidectomy  Her family history includes Alzheimer's disease in her sister; Colon cancer in her sister; Diabetes in her father; Heart disease in her father; Hypertension in her father and mother  She  reports that she has never smoked  She has never used smokeless tobacco  She reports that she does not drink alcohol or use drugs    Current Outpatient Medications   Medication Sig Dispense Refill    acetaminophen (TYLENOL) 500 mg tablet Take 1,000 mg by mouth      ALPRAZolam (XANAX) 0 25 mg tablet Take 1 tablet (0 25 mg total) by mouth every 12 (twelve) hours as needed for anxiety 60 tablet 5    calcium-vitamin D 250-100 MG-UNIT per tablet Take 1 tablet by mouth 2 (two) times a day      escitalopram (LEXAPRO) 5 mg tablet Take 1 tablet (5 mg total) by mouth daily 30 tablet 5    esomeprazole (NexIUM) 40 MG capsule Take 1 capsule (40 mg total) by mouth daily 30 capsule 5    fluticasone (FLONASE) 50 mcg/act nasal spray 2 sprays into each nostril daily 16 g 5    furosemide (LASIX) 40 mg tablet Take 1 tablet (40 mg total) by mouth daily 30 tablet 5    Melatonin 10 MG TABS Take 3 mg by mouth        mirtazapine (REMERON) 15 mg tablet Take 1 tablet (15 mg total) by mouth daily at bedtime 90 tablet 3    montelukast (SINGULAIR) 10 mg tablet Take 1 tablet (10 mg total) by mouth daily 30 tablet 5    multivitamin (THERAGRAN) TABS Take 1 tablet by mouth daily      potassium chloride (K-DUR,KLOR-CON) 20 mEq tablet Take 1 tablet (20 mEq total) by mouth daily 30 tablet 5    senna-docusate sodium (SENOKOT-S) 8 6-50 mg per tablet Take 1 tablet by mouth daily as needed for constipation 30 tablet 5    XARELTO 20 MG tablet Take 20 mg by mouth daily  11    triamcinolone (KENALOG) 0 1 % ointment APPLY TWICE A DAY TO AFFECTED AREA X1-2 WEEKS  0     No current facility-administered medications for this visit        Current Outpatient Medications on File Prior to Visit   Medication Sig    acetaminophen (TYLENOL) 500 mg tablet Take 1,000 mg by mouth    ALPRAZolam (XANAX) 0 25 mg tablet Take 1 tablet (0 25 mg total) by mouth every 12 (twelve) hours as needed for anxiety    calcium-vitamin D 250-100 MG-UNIT per tablet Take 1 tablet by mouth 2 (two) times a day    escitalopram (LEXAPRO) 5 mg tablet Take 1 tablet (5 mg total) by mouth daily    esomeprazole (NexIUM) 40 MG capsule Take 1 capsule (40 mg total) by mouth daily    fluticasone (FLONASE) 50 mcg/act nasal spray 2 sprays into each nostril daily    furosemide (LASIX) 40 mg tablet Take 1 tablet (40 mg total) by mouth daily    Melatonin 10 MG TABS Take 3 mg by mouth      mirtazapine (REMERON) 15 mg tablet Take 1 tablet (15 mg total) by mouth daily at bedtime    montelukast (SINGULAIR) 10 mg tablet Take 1 tablet (10 mg total) by mouth daily    multivitamin (THERAGRAN) TABS Take 1 tablet by mouth daily    potassium chloride (K-DUR,KLOR-CON) 20 mEq tablet Take 1 tablet (20 mEq total) by mouth daily    senna-docusate sodium (SENOKOT-S) 8 6-50 mg per tablet Take 1 tablet by mouth daily as needed for constipation    XARELTO 20 MG tablet Take 20 mg by mouth daily    [DISCONTINUED] diazepam (VALIUM) 2 mg tablet Take 1 tablet by mouth 2 (two) times a day as needed    [DISCONTINUED] lidocaine (LIDODERM) 5 % Apply 1 patch topically daily Remove & Discard patch within 12 hours or as directed by MD    [DISCONTINUED] methocarbamol (ROBAXIN) 500 mg tablet Take 1 tablet by mouth 3 (three) times a day as needed    [DISCONTINUED] olmesartan (BENICAR) 5 mg tablet Take 1 tablet (5 mg total) by mouth daily    triamcinolone (KENALOG) 0 1 % ointment APPLY TWICE A DAY TO AFFECTED AREA X1-2 WEEKS    [DISCONTINUED] diclofenac sodium (VOLTAREN) 1 % Apply 4 g topically 4 (four) times a day (Patient not taking: Reported on 5/14/2019)    [DISCONTINUED] hydrocortisone (ANUSOL-HC) 2 5 % rectal cream Insert 1 application into the rectum Three times a day    [DISCONTINUED] hydrocortisone (PROCTOZONE-HC) 2 5 % rectal cream Insert into the rectum    [DISCONTINUED] lidocaine (LIDODERM) 5 % Apply 1 patch topically    [DISCONTINUED] senna (SENOKOT) 8 6 mg Take 1 tablet (8 6 mg total) by mouth daily at bedtime (Patient not taking: Reported on 11/15/2019)     No current facility-administered medications on file prior to visit        She is allergic to codeine       Review of Systems   All other systems reviewed and are negative  Objective:      /74   Pulse 84   Ht 5' 1" (1 549 m)   Wt 70 3 kg (155 lb)   SpO2 99%   BMI 29 29 kg/m²          Physical Exam   Constitutional: She is oriented to person, place, and time  She appears well-developed and well-nourished  Neck: Normal range of motion  Neck supple  Cardiovascular: Normal rate, regular rhythm, normal heart sounds and intact distal pulses  Pulmonary/Chest: Effort normal and breath sounds normal    Abdominal: Soft  Bowel sounds are normal    Musculoskeletal: Normal range of motion  Neurological: She is alert and oriented to person, place, and time  Skin: Skin is warm and dry  Capillary refill takes less than 2 seconds  Psychiatric: She has a normal mood and affect  Her behavior is normal  Judgment and thought content normal    Nursing note and vitals reviewed  BMI Counseling: Body mass index is 29 29 kg/m²  The BMI is above normal  Nutrition recommendations include reducing portion sizes, decreasing overall calorie intake, 3-5 servings of fruits/vegetables daily, reducing fast food intake, consuming healthier snacks, decreasing soda and/or juice intake, moderation in carbohydrate intake, increasing intake of lean protein, reducing intake of saturated fat and trans fat and reducing intake of cholesterol  Exercise recommendations include moderate aerobic physical activity for 150 minutes/week, vigorous aerobic physical activity for 75 minutes/week, exercising 3-5 times per week, joining a gym and strength training exercises

## 2020-01-08 DIAGNOSIS — F32.89 OTHER DEPRESSION: ICD-10-CM

## 2020-01-08 DIAGNOSIS — J45.909 ASTHMA DUE TO ENVIRONMENTAL ALLERGIES: ICD-10-CM

## 2020-01-08 DIAGNOSIS — K21.9 GASTROESOPHAGEAL REFLUX DISEASE, ESOPHAGITIS PRESENCE NOT SPECIFIED: ICD-10-CM

## 2020-01-08 RX ORDER — ESOMEPRAZOLE MAGNESIUM 40 MG/1
40 CAPSULE, DELAYED RELEASE ORAL DAILY
Qty: 30 CAPSULE | Refills: 5 | Status: SHIPPED | OUTPATIENT
Start: 2020-01-08 | End: 2020-07-09 | Stop reason: SDUPTHER

## 2020-01-08 RX ORDER — MONTELUKAST SODIUM 10 MG/1
10 TABLET ORAL DAILY
Qty: 30 TABLET | Refills: 5 | Status: SHIPPED | OUTPATIENT
Start: 2020-01-08 | End: 2020-07-09 | Stop reason: SDUPTHER

## 2020-01-08 RX ORDER — MIRTAZAPINE 15 MG/1
15 TABLET, FILM COATED ORAL
Qty: 90 TABLET | Refills: 3 | Status: SHIPPED | OUTPATIENT
Start: 2020-01-08 | End: 2021-01-08 | Stop reason: SDUPTHER

## 2020-02-11 ENCOUNTER — TELEPHONE (OUTPATIENT)
Dept: FAMILY MEDICINE CLINIC | Facility: CLINIC | Age: 85
End: 2020-02-11

## 2020-02-11 DIAGNOSIS — J02.8 PHARYNGITIS DUE TO OTHER ORGANISM: Primary | ICD-10-CM

## 2020-02-11 DIAGNOSIS — J01.00 ACUTE NON-RECURRENT MAXILLARY SINUSITIS: ICD-10-CM

## 2020-02-11 RX ORDER — AMOXICILLIN 500 MG/1
500 CAPSULE ORAL EVERY 8 HOURS SCHEDULED
Qty: 21 CAPSULE | Refills: 0 | Status: SHIPPED | OUTPATIENT
Start: 2020-02-11 | End: 2020-02-18

## 2020-02-11 NOTE — TELEPHONE ENCOUNTER
Patient called complaints of sore throat x3 days  Unable to come in for an appt   Asking for something to be called in

## 2020-03-09 DIAGNOSIS — R60.0 LOCALIZED EDEMA: ICD-10-CM

## 2020-03-11 RX ORDER — FUROSEMIDE 40 MG/1
40 TABLET ORAL DAILY
Qty: 30 TABLET | Refills: 5 | Status: SHIPPED | OUTPATIENT
Start: 2020-03-11 | End: 2020-09-08 | Stop reason: SDUPTHER

## 2020-05-07 DIAGNOSIS — F41.9 ANXIETY: ICD-10-CM

## 2020-05-07 RX ORDER — ESCITALOPRAM OXALATE 5 MG/1
5 TABLET ORAL DAILY
Qty: 30 TABLET | Refills: 5 | Status: SHIPPED | OUTPATIENT
Start: 2020-05-07 | End: 2020-05-08 | Stop reason: SDUPTHER

## 2020-05-08 DIAGNOSIS — F41.9 ANXIETY: ICD-10-CM

## 2020-05-08 RX ORDER — ESCITALOPRAM OXALATE 5 MG/1
5 TABLET ORAL DAILY
Qty: 30 TABLET | Refills: 5 | Status: SHIPPED | OUTPATIENT
Start: 2020-05-08 | End: 2020-11-06

## 2020-06-08 ENCOUNTER — TELEPHONE (OUTPATIENT)
Dept: FAMILY MEDICINE CLINIC | Facility: CLINIC | Age: 85
End: 2020-06-08

## 2020-06-08 DIAGNOSIS — B02.29 PHN (POSTHERPETIC NEURALGIA): ICD-10-CM

## 2020-06-08 DIAGNOSIS — G89.29 CHRONIC PAIN OF BOTH KNEES: Primary | ICD-10-CM

## 2020-06-08 DIAGNOSIS — M25.561 CHRONIC PAIN OF BOTH KNEES: Primary | ICD-10-CM

## 2020-06-08 DIAGNOSIS — M25.562 CHRONIC PAIN OF BOTH KNEES: Primary | ICD-10-CM

## 2020-06-08 RX ORDER — LIDOCAINE 50 MG/G
1 PATCH TOPICAL DAILY
Qty: 30 PATCH | Refills: 5 | Status: SHIPPED | OUTPATIENT
Start: 2020-06-08 | End: 2020-06-09 | Stop reason: SDUPTHER

## 2020-06-09 DIAGNOSIS — B02.29 PHN (POSTHERPETIC NEURALGIA): ICD-10-CM

## 2020-06-09 RX ORDER — LIDOCAINE 50 MG/G
1 PATCH TOPICAL DAILY
Qty: 30 PATCH | Refills: 5 | Status: SHIPPED | OUTPATIENT
Start: 2020-06-09 | End: 2021-07-21

## 2020-06-29 ENCOUNTER — OFFICE VISIT (OUTPATIENT)
Dept: FAMILY MEDICINE CLINIC | Facility: CLINIC | Age: 85
End: 2020-06-29
Payer: MEDICARE

## 2020-06-29 VITALS
TEMPERATURE: 98.2 F | DIASTOLIC BLOOD PRESSURE: 76 MMHG | HEART RATE: 87 BPM | BODY MASS INDEX: 30.96 KG/M2 | WEIGHT: 164 LBS | HEIGHT: 61 IN | SYSTOLIC BLOOD PRESSURE: 130 MMHG | OXYGEN SATURATION: 96 %

## 2020-06-29 DIAGNOSIS — I82.4Z9 DEEP VEIN THROMBOSIS (DVT) OF DISTAL VEIN OF LOWER EXTREMITY, UNSPECIFIED CHRONICITY, UNSPECIFIED LATERALITY (HCC): ICD-10-CM

## 2020-06-29 DIAGNOSIS — I48.91 ATRIAL FIBRILLATION, UNSPECIFIED TYPE (HCC): ICD-10-CM

## 2020-06-29 DIAGNOSIS — Z00.00 MEDICARE ANNUAL WELLNESS VISIT, INITIAL: Primary | ICD-10-CM

## 2020-06-29 DIAGNOSIS — I10 ESSENTIAL HYPERTENSION: ICD-10-CM

## 2020-06-29 PROCEDURE — 1125F AMNT PAIN NOTED PAIN PRSNT: CPT | Performed by: FAMILY MEDICINE

## 2020-06-29 PROCEDURE — 3078F DIAST BP <80 MM HG: CPT | Performed by: FAMILY MEDICINE

## 2020-06-29 PROCEDURE — G0438 PPPS, INITIAL VISIT: HCPCS | Performed by: FAMILY MEDICINE

## 2020-06-29 PROCEDURE — 1123F ACP DISCUSS/DSCN MKR DOCD: CPT | Performed by: FAMILY MEDICINE

## 2020-06-29 PROCEDURE — 3075F SYST BP GE 130 - 139MM HG: CPT | Performed by: FAMILY MEDICINE

## 2020-06-29 PROCEDURE — 1170F FXNL STATUS ASSESSED: CPT | Performed by: FAMILY MEDICINE

## 2020-06-29 PROCEDURE — 3008F BODY MASS INDEX DOCD: CPT | Performed by: FAMILY MEDICINE

## 2020-06-29 PROCEDURE — 4040F PNEUMOC VAC/ADMIN/RCVD: CPT | Performed by: FAMILY MEDICINE

## 2020-06-29 PROCEDURE — 1036F TOBACCO NON-USER: CPT | Performed by: FAMILY MEDICINE

## 2020-06-29 RX ORDER — LISINOPRIL 2.5 MG/1
TABLET ORAL
COMMUNITY
End: 2021-04-23 | Stop reason: ALTCHOICE

## 2020-06-29 RX ORDER — LORATADINE 10 MG/1
TABLET ORAL
COMMUNITY
Start: 2016-08-26 | End: 2021-04-23 | Stop reason: ALTCHOICE

## 2020-06-29 RX ORDER — MOMETASONE FUROATE 50 UG/1
SPRAY, METERED NASAL
COMMUNITY
Start: 2017-02-20 | End: 2020-08-13 | Stop reason: SDUPTHER

## 2020-06-29 RX ORDER — WARFARIN SODIUM 2 MG/1
TABLET ORAL
COMMUNITY
End: 2021-10-21

## 2020-06-29 RX ORDER — WARFARIN SODIUM 5 MG/1
TABLET ORAL
COMMUNITY
End: 2021-10-21

## 2020-06-29 RX ORDER — MOMETASONE FUROATE 1 MG/G
CREAM TOPICAL
COMMUNITY
Start: 2017-04-25

## 2020-07-09 DIAGNOSIS — M54.9 BACK PAIN, UNSPECIFIED BACK LOCATION, UNSPECIFIED BACK PAIN LATERALITY, UNSPECIFIED CHRONICITY: ICD-10-CM

## 2020-07-09 DIAGNOSIS — K21.9 GASTROESOPHAGEAL REFLUX DISEASE, ESOPHAGITIS PRESENCE NOT SPECIFIED: ICD-10-CM

## 2020-07-09 DIAGNOSIS — R60.0 LOCALIZED EDEMA: ICD-10-CM

## 2020-07-09 DIAGNOSIS — J45.909 ASTHMA DUE TO ENVIRONMENTAL ALLERGIES: ICD-10-CM

## 2020-07-09 RX ORDER — MONTELUKAST SODIUM 10 MG/1
10 TABLET ORAL DAILY
Qty: 30 TABLET | Refills: 5 | Status: SHIPPED | OUTPATIENT
Start: 2020-07-09 | End: 2021-01-08

## 2020-07-09 RX ORDER — POTASSIUM CHLORIDE 20 MEQ/1
20 TABLET, EXTENDED RELEASE ORAL DAILY
Qty: 30 TABLET | Refills: 5 | Status: SHIPPED | OUTPATIENT
Start: 2020-07-09 | End: 2021-01-08

## 2020-07-09 RX ORDER — FLUTICASONE PROPIONATE 50 MCG
2 SPRAY, SUSPENSION (ML) NASAL DAILY
Qty: 16 G | Refills: 5 | Status: SHIPPED | OUTPATIENT
Start: 2020-07-09 | End: 2020-08-13 | Stop reason: SDUPTHER

## 2020-07-09 RX ORDER — ESOMEPRAZOLE MAGNESIUM 40 MG/1
40 CAPSULE, DELAYED RELEASE ORAL DAILY
Qty: 30 CAPSULE | Refills: 5 | Status: SHIPPED | OUTPATIENT
Start: 2020-07-09 | End: 2021-01-08

## 2020-07-09 NOTE — PROGRESS NOTES
Assessment/Plan:    No problem-specific Assessment & Plan notes found for this encounter  Diagnoses and all orders for this visit:    Benign essential hypertension    Deep vein thrombosis (DVT) of distal vein of lower extremity, unspecified chronicity, unspecified laterality (HCC)    Heart block    Essential hypertension    SAH (subarachnoid hemorrhage) (HCC)    SDH (subdural hematoma) (HCC)    S/P hip hemiarthroplasty    Status post reverse total replacement of left shoulder    Chronic pain of both knees  -     Ambulatory referral to Orthopedic Surgery; Future        HTN:  BP at goal   Continue current  H/o complete heart bloock:  S/p pacer placement  H/o afib and PE:  Continue Xarelto  Subjective:      Patient ID: Magali Weir is a 80 y o  female  She has b/l knee pain  She is not a candidate for joint replacement  She did well with steroid shots in the past   She would like to see ortho for injections  She is anticoagulated for h/o PE and a vague h/o Afib  She has HTN  She has no CP or SOB  She has no HA or vision changes  She fell in October of 2017  She had a hip fracture and shoulder fracture  She had a intracranial bleed and subdural hematoma  She was diagnosed with complete heart block about 8 years ago  She had a pacer placed, but there were complications  She had a perforated left ventricle and a AR  She had leads replaced, and infected pocket and needed to have generator replaced  She is feeling well overall  The following portions of the patient's history were reviewed and updated as appropriate:   She  has a past medical history of Abnormal electrocardiogram; Arthritis; Atrial fibrillation ([address] ); Complete atrioventricular block ([address] ); Diverticulosis; GERD (gastroesophageal reflux disease); Glaucoma; Hiatal hernia; Hypertension; Intermittent claudication ([address] ); Macular degeneration; Pericardial effusion; and Pulmonary embolism ([address] )    She Patient Active Problem List    Diagnosis Date Noted    Chronic pain of both knees 07/12/2018    Constipation 02/02/2018    Lymphedema 02/02/2018    Subarachnoid hemorrhage following injury, with loss of consciousness ([facility] ) 11/21/2017    Closed 4-part fracture of proximal end of left humerus with routine healing 10/27/2017    Depressed 10/27/2017    Fracture of femoral neck, left ([address] ) 10/27/2017    Fracture of left humerus 10/27/2017    Gait abnormality 10/27/2017    Pacemaker 10/27/2017    S/P hip hemiarthroplasty 10/27/2017    SAH (subarachnoid hemorrhage) ([address] ) 10/27/2017    SDH [facility]subdural hematoma) [facility] 10/27/2017    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    Memory impairment 10/25/2017    Atrial fibrillation ([address] ) 10/20/2017    Closed fracture of maxillary sinus ([facility] [address] ) 10/20/2017    History of DVT (deep vein thrombosis) 10/20/2017    Hx pulmonary embolism 10/20/2017    Hip fracture ([address] ) 10/19/2017    PHN (postherpetic neuralgia) 02/23/2017    Cough 08/22/2016    Hemorrhage of anus and rectum 06/30/2016    Chronic GERD 06/15/2016    Ambulatory dysfunction 04/12/2016    Joint pain, hip 01/04/2016    Trochanteric bursitis of left hip 01/04/2016    Enteritis 12/15/2015    Diaphragmatic hernia without obstruction or gangrene 06/01/2015    Seborrheic keratosis 01/23/2015    Hypertension 46/61/6354    Diastolic dysfunction 92/21/1103    Fatigue 07/15/2014    Edema 06/30/2014    Anemia 11/14/2013    Heart block 11/14/2013    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 ([address] ) 09/10/2013    Sciatica 09/10/2013    Backache 08/30/2013    Vertigo 08/08/2013    Leg swelling 08/01/2013    Hip pain, acute, unspecified laterality 01/11/2013    Deep venous thrombosis of distal lower extremity ([address] ) 11/20/2012    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, unspecified laterality 11/20/2012    Osteoarthritis of knee 11/20/2012    Osteoporosis 11/20/2012    Anxiety 11/16/2012    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    Benign essential hypertension 11/16/2012    Hyperlipidemia 11/16/2012    Osteoarthritis 11/16/2012     She  has a past surgical history that includes Appendectomy; Hysterectomy; and Tonsillectomy and adenoidectomy  Her family history includes Alzheimer's disease in her sister; Colon cancer in her sister; Diabetes in her father; Heart disease in her father; Hypertension in her father and mother  She  reports that she has never smoked  She has never used smokeless tobacco  She reports that she does not drink alcohol or use drugs  Current Outpatient Prescriptions   Medication Sig Dispense Refill    acetaminophen (TYLENOL) 500 mg tablet Take 1,000 mg by mouth      ALPRAZolam (XANAX) 0 25 mg tablet Take 1 tablet (0 25 mg total) by mouth every 12 (twelve) hours as needed for anxiety 60 tablet 5    calcium-vitamin D 250-100 MG-UNIT per tablet Take 1 tablet by mouth 2 (two) times a day      diazepam (VALIUM) 2 mg tablet Take 1 tablet by mouth 2 (two) times a day as needed      diclofenac sodium (VOLTAREN) 1 % Place on the skin      escitalopram (LEXAPRO) 5 mg tablet TAKE 1 TABLET DAILY   30 tablet 5    esomeprazole (NexIUM) 40 MG capsule Take 40 mg by mouth      fluticasone (FLONASE) 50 mcg/act nasal spray 2 sprays into each nostril      furosemide (LASIX) 40 mg tablet Take 1 tablet (40 mg total) by mouth daily 30 tablet 5    hydrocortisone (ANUSOL-HC) 2 5 % rectal cream Insert 1 application into the rectum Three times a day      hydrocortisone (PROCTOZONE-HC) 2 5 % rectal cream Insert into the rectum      lidocaine (LIDODERM) 5 % Apply 1 patch topically      lidocaine (LIDODERM) 5 % Place 1 patch on the skin daily Remove & Discard patch within 12 hours or as directed by MD 30 patch 0    Melatonin 10 MG TABS Take 3 mg by mouth        methocarbamol (ROBAXIN) 500 mg tablet Take 1 tablet by mouth 3 (three) times a day as needed      mirtazapine (REMERON) 15 mg tablet Take 1 tablet (15 mg total) by mouth daily at bedtime 30 tablet 5    montelukast (SINGULAIR) 10 mg tablet Take 1 tablet (10 mg total) by mouth daily 30 tablet 5    multivitamin (THERAGRAN) TABS Take 1 tablet by mouth daily      potassium chloride (K-DUR,KLOR-CON) 20 mEq tablet Take 1 tablet (20 mEq total) by mouth daily 30 tablet 5    senna-docusate sodium (SENOKOT-S) 8 6-50 mg per tablet Take 1 tablet by mouth      triamcinolone (KENALOG) 0 1 % ointment APPLY TWICE A DAY TO AFFECTED AREA X1-2 WEEKS  0    valsartan (DIOVAN) 40 mg tablet Take 1 tablet (40 mg total) by mouth daily 30 tablet 5    XARELTO 20 MG tablet Take 20 mg by mouth daily  11    amoxicillin (AMOXIL) 500 mg capsule       esomeprazole (NexIUM) 40 MG capsule Take 1 capsule (40 mg total) by mouth daily 30 capsule 5    furosemide (LASIX) 20 mg tablet Take 40 mg by mouth daily        Potassium Chloride ER 20 MEQ TBCR Take by mouth daily      rivaroxaban (XARELTO) 15 mg tablet Take by mouth      senna (SENOKOT) 8 6 mg Take 1 tablet (8 6 mg total) by mouth daily at bedtime 120 each 5     No current facility-administered medications for this visit  Current Outpatient Prescriptions on File Prior to Visit   Medication Sig    acetaminophen (TYLENOL) 500 mg tablet Take 1,000 mg by mouth    ALPRAZolam (XANAX) 0 25 mg tablet Take 1 tablet (0 25 mg total) by mouth every 12 (twelve) hours as needed for anxiety    calcium-vitamin D 250-100 MG-UNIT per tablet Take 1 tablet by mouth 2 (two) times a day    diazepam (VALIUM) 2 mg tablet Take 1 tablet by mouth 2 (two) times a day as needed    diclofenac sodium (VOLTAREN) 1 % Place on the skin    escitalopram (LEXAPRO) 5 mg tablet TAKE 1 TABLET DAILY      esomeprazole (NexIUM) 40 MG capsule Take 40 mg by mouth    fluticasone (FLONASE) 50 mcg/act nasal spray 2 sprays into each nostril    furosemide (LASIX) 40 mg tablet Take 1 tablet (40 mg total) by mouth daily  hydrocortisone (ANUSOL-HC) 2 5 % rectal cream Insert 1 application into the rectum Three times a day    hydrocortisone (PROCTOZONE-HC) 2 5 % rectal cream Insert into the rectum    lidocaine (LIDODERM) 5 % Apply 1 patch topically    lidocaine (LIDODERM) 5 % Place 1 patch on the skin daily Remove & Discard patch within 12 hours or as directed by MD    Melatonin 10 MG TABS Take 3 mg by mouth      methocarbamol (ROBAXIN) 500 mg tablet Take 1 tablet by mouth 3 (three) times a day as needed    mirtazapine (REMERON) 15 mg tablet Take 1 tablet (15 mg total) by mouth daily at bedtime    montelukast (SINGULAIR) 10 mg tablet Take 1 tablet (10 mg total) by mouth daily    multivitamin (THERAGRAN) TABS Take 1 tablet by mouth daily    potassium chloride (K-DUR,KLOR-CON) 20 mEq tablet Take 1 tablet (20 mEq total) by mouth daily    senna-docusate sodium (SENOKOT-S) 8 6-50 mg per tablet Take 1 tablet by mouth    triamcinolone (KENALOG) 0 1 % ointment APPLY TWICE A DAY TO AFFECTED AREA X1-2 WEEKS    valsartan (DIOVAN) 40 mg tablet Take 1 tablet (40 mg total) by mouth daily    XARELTO 20 MG tablet Take 20 mg by mouth daily    amoxicillin (AMOXIL) 500 mg capsule     esomeprazole (NexIUM) 40 MG capsule Take 1 capsule (40 mg total) by mouth daily    furosemide (LASIX) 20 mg tablet Take 40 mg by mouth daily      Potassium Chloride ER 20 MEQ TBCR Take by mouth daily    rivaroxaban (XARELTO) 15 mg tablet Take by mouth    senna (SENOKOT) 8 6 mg Take 1 tablet (8 6 mg total) by mouth daily at bedtime    [DISCONTINUED] oxyCODONE (ROXICODONE) 15 mg immediate release tablet Take 7 5 mg by mouth every 6 (six) hours     No current facility-administered medications on file prior to visit  She is allergic to codeine       Review of Systems      Objective:      /70 (BP Location: Right arm, Patient Position: Sitting, Cuff Size: Standard)   Pulse 87   Resp 15   Ht 5' 1" (1 549 m)   Wt 70 3 kg (155 lb)   SpO2 96% BMI 29 29 kg/m²          Physical Exam English

## 2020-08-13 ENCOUNTER — TELEPHONE (OUTPATIENT)
Dept: FAMILY MEDICINE CLINIC | Facility: CLINIC | Age: 85
End: 2020-08-13

## 2020-08-13 DIAGNOSIS — J01.00 ACUTE NON-RECURRENT MAXILLARY SINUSITIS: Primary | ICD-10-CM

## 2020-08-13 DIAGNOSIS — M54.9 BACK PAIN, UNSPECIFIED BACK LOCATION, UNSPECIFIED BACK PAIN LATERALITY, UNSPECIFIED CHRONICITY: ICD-10-CM

## 2020-08-13 DIAGNOSIS — Z91.09 ENVIRONMENTAL ALLERGIES: ICD-10-CM

## 2020-08-13 DIAGNOSIS — J32.9 SINUSITIS, UNSPECIFIED CHRONICITY, UNSPECIFIED LOCATION: Primary | ICD-10-CM

## 2020-08-13 RX ORDER — AMOXICILLIN 500 MG/1
500 CAPSULE ORAL EVERY 8 HOURS SCHEDULED
Qty: 21 CAPSULE | Refills: 0 | Status: SHIPPED | OUTPATIENT
Start: 2020-08-13 | End: 2020-08-20

## 2020-08-13 RX ORDER — FLUTICASONE PROPIONATE 50 MCG
2 SPRAY, SUSPENSION (ML) NASAL DAILY
Qty: 16 G | Refills: 5 | Status: SHIPPED | OUTPATIENT
Start: 2020-08-13 | End: 2021-03-09

## 2020-09-08 DIAGNOSIS — R60.0 LOCALIZED EDEMA: ICD-10-CM

## 2020-09-08 RX ORDER — FUROSEMIDE 40 MG/1
40 TABLET ORAL DAILY
Qty: 30 TABLET | Refills: 5 | Status: SHIPPED | OUTPATIENT
Start: 2020-09-08 | End: 2021-09-08

## 2020-09-10 DIAGNOSIS — F41.9 ANXIETY: ICD-10-CM

## 2020-09-10 RX ORDER — ALPRAZOLAM 0.25 MG/1
0.25 TABLET ORAL EVERY 12 HOURS PRN
Qty: 60 TABLET | Refills: 0 | Status: SHIPPED | OUTPATIENT
Start: 2020-09-10 | End: 2020-09-10

## 2020-09-10 RX ORDER — ALPRAZOLAM 0.25 MG/1
0.25 TABLET ORAL EVERY 12 HOURS PRN
Qty: 60 TABLET | Refills: 0 | Status: SHIPPED | OUTPATIENT
Start: 2020-09-10 | End: 2021-10-11 | Stop reason: SDUPTHER

## 2020-11-06 DIAGNOSIS — F41.9 ANXIETY: ICD-10-CM

## 2020-11-06 RX ORDER — ESCITALOPRAM OXALATE 5 MG/1
5 TABLET ORAL DAILY
Qty: 30 TABLET | Refills: 5 | Status: SHIPPED | OUTPATIENT
Start: 2020-11-06 | End: 2021-06-08 | Stop reason: SDUPTHER

## 2021-01-08 DIAGNOSIS — R60.0 LOCALIZED EDEMA: ICD-10-CM

## 2021-01-08 DIAGNOSIS — J45.909 ASTHMA DUE TO ENVIRONMENTAL ALLERGIES: ICD-10-CM

## 2021-01-08 DIAGNOSIS — K21.9 GASTROESOPHAGEAL REFLUX DISEASE: ICD-10-CM

## 2021-01-08 DIAGNOSIS — F32.89 OTHER DEPRESSION: ICD-10-CM

## 2021-01-08 RX ORDER — MIRTAZAPINE 15 MG/1
15 TABLET, FILM COATED ORAL
Qty: 90 TABLET | Refills: 3 | Status: SHIPPED | OUTPATIENT
Start: 2021-01-08 | End: 2021-02-08 | Stop reason: SDUPTHER

## 2021-01-08 RX ORDER — ESOMEPRAZOLE MAGNESIUM 40 MG/1
40 CAPSULE, DELAYED RELEASE ORAL DAILY
Qty: 30 CAPSULE | Refills: 5 | Status: SHIPPED | OUTPATIENT
Start: 2021-01-08 | End: 2021-04-23 | Stop reason: SDUPTHER

## 2021-01-08 RX ORDER — MONTELUKAST SODIUM 10 MG/1
10 TABLET ORAL DAILY
Qty: 30 TABLET | Refills: 5 | Status: SHIPPED | OUTPATIENT
Start: 2021-01-08 | End: 2021-07-07

## 2021-01-08 RX ORDER — MONTELUKAST SODIUM 10 MG/1
10 TABLET ORAL DAILY
Qty: 30 TABLET | Refills: 5 | Status: SHIPPED | OUTPATIENT
Start: 2021-01-08 | End: 2021-04-23

## 2021-01-08 RX ORDER — ESOMEPRAZOLE MAGNESIUM 40 MG/1
40 CAPSULE, DELAYED RELEASE ORAL DAILY
Qty: 30 CAPSULE | Refills: 5 | Status: SHIPPED | OUTPATIENT
Start: 2021-01-08 | End: 2021-07-07

## 2021-01-08 RX ORDER — RIVAROXABAN 20 MG/1
20 TABLET, FILM COATED ORAL DAILY
Qty: 30 TABLET | Refills: 11 | Status: SHIPPED | OUTPATIENT
Start: 2021-01-08 | End: 2022-01-10

## 2021-01-08 RX ORDER — POTASSIUM CHLORIDE 20 MEQ/1
20 TABLET, EXTENDED RELEASE ORAL DAILY
Qty: 30 TABLET | Refills: 5 | Status: SHIPPED | OUTPATIENT
Start: 2021-01-08 | End: 2021-10-11 | Stop reason: SDUPTHER

## 2021-01-26 ENCOUNTER — IMMUNIZATIONS (OUTPATIENT)
Dept: FAMILY MEDICINE CLINIC | Facility: HOSPITAL | Age: 86
End: 2021-01-26

## 2021-01-26 DIAGNOSIS — Z23 ENCOUNTER FOR IMMUNIZATION: Primary | ICD-10-CM

## 2021-01-26 PROCEDURE — 91301 SARS-COV-2 / COVID-19 MRNA VACCINE (MODERNA) 100 MCG: CPT

## 2021-01-26 PROCEDURE — 0011A SARS-COV-2 / COVID-19 MRNA VACCINE (MODERNA) 100 MCG: CPT

## 2021-02-08 DIAGNOSIS — F32.89 OTHER DEPRESSION: ICD-10-CM

## 2021-02-08 RX ORDER — MIRTAZAPINE 15 MG/1
15 TABLET, FILM COATED ORAL
Qty: 90 TABLET | Refills: 3 | Status: SHIPPED | OUTPATIENT
Start: 2021-02-08 | End: 2022-01-10

## 2021-03-01 ENCOUNTER — IMMUNIZATIONS (OUTPATIENT)
Dept: FAMILY MEDICINE CLINIC | Facility: HOSPITAL | Age: 86
End: 2021-03-01

## 2021-03-01 DIAGNOSIS — Z23 ENCOUNTER FOR IMMUNIZATION: Primary | ICD-10-CM

## 2021-03-01 PROCEDURE — 91301 SARS-COV-2 / COVID-19 MRNA VACCINE (MODERNA) 100 MCG: CPT

## 2021-03-01 PROCEDURE — 0012A SARS-COV-2 / COVID-19 MRNA VACCINE (MODERNA) 100 MCG: CPT

## 2021-03-05 DIAGNOSIS — Z91.09 ENVIRONMENTAL ALLERGIES: ICD-10-CM

## 2021-03-09 RX ORDER — FLUTICASONE PROPIONATE 50 MCG
SPRAY, SUSPENSION (ML) NASAL
Qty: 16 G | Refills: 2 | Status: SHIPPED | OUTPATIENT
Start: 2021-03-09

## 2021-04-23 ENCOUNTER — OFFICE VISIT (OUTPATIENT)
Dept: FAMILY MEDICINE CLINIC | Facility: CLINIC | Age: 86
End: 2021-04-23
Payer: MEDICARE

## 2021-04-23 VITALS
BODY MASS INDEX: 26.81 KG/M2 | HEART RATE: 73 BPM | SYSTOLIC BLOOD PRESSURE: 130 MMHG | TEMPERATURE: 97.5 F | WEIGHT: 142 LBS | DIASTOLIC BLOOD PRESSURE: 68 MMHG | HEIGHT: 61 IN | OXYGEN SATURATION: 95 %

## 2021-04-23 DIAGNOSIS — L98.9 SKIN LESION OF WRIST: Primary | ICD-10-CM

## 2021-04-23 PROCEDURE — 99213 OFFICE O/P EST LOW 20 MIN: CPT | Performed by: FAMILY MEDICINE

## 2021-04-23 RX ORDER — OLMESARTAN MEDOXOMIL 5 MG/1
5 TABLET ORAL DAILY
COMMUNITY

## 2021-04-23 NOTE — PROGRESS NOTES
Assessment/Plan:    Abbie Michael is a 80year old female  with PMHx of Atrial fibrillation, HTN, DVT, PE, heart block s/p pacemaker, SAH, osteoporosis, actinic keratosis, presents today c/o right dorsal wrist skin lesion  (please see picture below)  Concern for precancerous skin lesion  Referred patient to dermatology for further evaluation and management  F/u in 6 months  Case d/w Dr Arvin Young  Diagnoses and all orders for this visit:    Skin lesion of wrist  -     Ambulatory referral to Dermatology; Future    BMI Counseling: Body mass index is 26 83 kg/m²  The BMI is above normal  Nutrition recommendations include 3-5 servings of fruits/vegetables daily, reducing fast food intake, consuming healthier snacks, decreasing soda and/or juice intake, moderation in carbohydrate intake, increasing intake of lean protein, reducing intake of saturated fat and trans fat and reducing intake of cholesterol  Subjective:      Patient ID: Lawrence Lentz is a 80 y o  female  HPI     Patient is a 80year old female with PMHx of Atrial fibrillation, HTN, DVT, PE, heart block s/p pacemaker, SAH, osteoporosis presents today c/o skin lesion  Patient reports 3-4 weeks duration of skin lesion growth at right dorsal wrist  Painful to touch, non-pruritic  Denied any drainage  Denied any recent injury or trauma  Patient has history of skin cancer  The following portions of the patient's history were reviewed and updated as appropriate: allergies, current medications, past family history, past medical history, past social history, past surgical history and problem list     Review of Systems   Constitutional: Negative for chills and fever  HENT: Negative for ear pain and sore throat  Eyes: Negative for pain and visual disturbance  Respiratory: Negative for cough and shortness of breath  Cardiovascular: Negative for chest pain and palpitations     Gastrointestinal: Negative for abdominal pain and vomiting  Genitourinary: Negative for dysuria and hematuria  Musculoskeletal: Negative for arthralgias and back pain  Skin: Positive for color change (right wrist skin lesion)  Negative for rash  Neurological: Negative for seizures and syncope  All other systems reviewed and are negative  Objective:      /68 (BP Location: Right arm, Patient Position: Sitting, Cuff Size: Large)   Pulse 73   Temp 97 5 °F (36 4 °C) (Temporal)   Ht 5' 1" (1 549 m)   Wt 64 4 kg (142 lb)   SpO2 95%   BMI 26 83 kg/m²          Physical Exam  Vitals signs and nursing note reviewed  Constitutional:       General: She is not in acute distress  Appearance: She is well-developed  HENT:      Head: Normocephalic  Mouth/Throat:      Pharynx: No oropharyngeal exudate  Eyes:      General: No scleral icterus  Right eye: No discharge  Left eye: No discharge  Conjunctiva/sclera: Conjunctivae normal    Neck:      Musculoskeletal: Normal range of motion  Cardiovascular:      Rate and Rhythm: Normal rate and regular rhythm  Heart sounds: Normal heart sounds  No murmur  Pulmonary:      Effort: Pulmonary effort is normal  No respiratory distress  Breath sounds: Normal breath sounds  No wheezing  Abdominal:      General: Bowel sounds are normal  There is no distension  Palpations: Abdomen is soft  Tenderness: There is no abdominal tenderness  Musculoskeletal:         General: No tenderness  Lymphadenopathy:      Cervical: No cervical adenopathy  Skin:     Findings: No erythema  Comments: Right dorsal wrist: 0 5 x 0 7 cm round nodule with tiny central umbilication, erythematous base, not warmth, tender to touch  Neurological:      Mental Status: She is alert and oriented to person, place, and time     Psychiatric:         Behavior: Behavior normal

## 2021-06-08 DIAGNOSIS — F41.9 ANXIETY: ICD-10-CM

## 2021-06-08 RX ORDER — ESCITALOPRAM OXALATE 5 MG/1
5 TABLET ORAL DAILY
Qty: 30 TABLET | Refills: 5 | Status: SHIPPED | OUTPATIENT
Start: 2021-06-08 | End: 2021-12-08

## 2021-07-07 DIAGNOSIS — K21.9 GASTROESOPHAGEAL REFLUX DISEASE: ICD-10-CM

## 2021-07-07 DIAGNOSIS — J45.909 ASTHMA DUE TO ENVIRONMENTAL ALLERGIES: ICD-10-CM

## 2021-07-07 RX ORDER — ESOMEPRAZOLE MAGNESIUM 40 MG/1
40 CAPSULE, DELAYED RELEASE ORAL DAILY
Qty: 30 CAPSULE | Refills: 5 | Status: SHIPPED | OUTPATIENT
Start: 2021-07-07 | End: 2022-01-12

## 2021-07-07 RX ORDER — MONTELUKAST SODIUM 10 MG/1
10 TABLET ORAL DAILY
Qty: 30 TABLET | Refills: 5 | Status: SHIPPED | OUTPATIENT
Start: 2021-07-07 | End: 2022-01-12

## 2021-07-20 ENCOUNTER — TELEPHONE (OUTPATIENT)
Dept: FAMILY MEDICINE CLINIC | Facility: CLINIC | Age: 86
End: 2021-07-20

## 2021-07-20 DIAGNOSIS — J32.9 SINUSITIS, UNSPECIFIED CHRONICITY, UNSPECIFIED LOCATION: Primary | ICD-10-CM

## 2021-07-20 RX ORDER — AMOXICILLIN 500 MG/1
500 CAPSULE ORAL EVERY 8 HOURS SCHEDULED
Qty: 21 CAPSULE | Refills: 0 | Status: SHIPPED | OUTPATIENT
Start: 2021-07-20 | End: 2021-07-27

## 2021-07-20 NOTE — TELEPHONE ENCOUNTER
Patient called, has had a sore throat for past week (no other symptoms) and wants you to [facility] in [place]   Please advise

## 2021-07-21 DIAGNOSIS — B02.29 PHN (POSTHERPETIC NEURALGIA): ICD-10-CM

## 2021-07-21 RX ORDER — LIDOCAINE 50 MG/G
1 PATCH TOPICAL DAILY
Qty: 30 PATCH | Refills: 5 | Status: SHIPPED | OUTPATIENT
Start: 2021-07-21 | End: 2022-06-28 | Stop reason: SDUPTHER

## 2021-09-08 DIAGNOSIS — R60.0 LOCALIZED EDEMA: ICD-10-CM

## 2021-09-08 RX ORDER — FUROSEMIDE 40 MG/1
40 TABLET ORAL DAILY
Qty: 30 TABLET | Refills: 5 | Status: SHIPPED | OUTPATIENT
Start: 2021-09-08 | End: 2022-03-08

## 2021-10-11 DIAGNOSIS — F41.9 ANXIETY: ICD-10-CM

## 2021-10-11 DIAGNOSIS — R60.0 LOCALIZED EDEMA: ICD-10-CM

## 2021-10-11 RX ORDER — ALPRAZOLAM 0.25 MG/1
0.25 TABLET ORAL EVERY 12 HOURS PRN
Qty: 60 TABLET | Refills: 0 | Status: SHIPPED | OUTPATIENT
Start: 2021-10-11 | End: 2021-11-08

## 2021-10-11 RX ORDER — POTASSIUM CHLORIDE 20 MEQ/1
20 TABLET, EXTENDED RELEASE ORAL DAILY
Qty: 30 TABLET | Refills: 5 | Status: SHIPPED | OUTPATIENT
Start: 2021-10-11 | End: 2022-05-09

## 2021-10-21 ENCOUNTER — OFFICE VISIT (OUTPATIENT)
Dept: FAMILY MEDICINE CLINIC | Facility: CLINIC | Age: 86
End: 2021-10-21
Payer: MEDICARE

## 2021-10-21 VITALS
DIASTOLIC BLOOD PRESSURE: 74 MMHG | WEIGHT: 143 LBS | TEMPERATURE: 97.8 F | HEART RATE: 81 BPM | HEIGHT: 61 IN | BODY MASS INDEX: 27 KG/M2 | OXYGEN SATURATION: 96 % | SYSTOLIC BLOOD PRESSURE: 126 MMHG

## 2021-10-21 DIAGNOSIS — Z00.00 MEDICARE ANNUAL WELLNESS VISIT, SUBSEQUENT: Primary | ICD-10-CM

## 2021-10-21 DIAGNOSIS — Z23 NEEDS FLU SHOT: ICD-10-CM

## 2021-10-21 DIAGNOSIS — N18.32 STAGE 3B CHRONIC KIDNEY DISEASE (HCC): ICD-10-CM

## 2021-10-21 DIAGNOSIS — S06.5X9A SDH (SUBDURAL HEMATOMA) (HCC): ICD-10-CM

## 2021-10-21 DIAGNOSIS — I82.4Z9 DEEP VEIN THROMBOSIS (DVT) OF DISTAL VEIN OF LOWER EXTREMITY, UNSPECIFIED CHRONICITY, UNSPECIFIED LATERALITY (HCC): ICD-10-CM

## 2021-10-21 DIAGNOSIS — I10 ESSENTIAL HYPERTENSION: ICD-10-CM

## 2021-10-21 DIAGNOSIS — I48.91 ATRIAL FIBRILLATION, UNSPECIFIED TYPE (HCC): ICD-10-CM

## 2021-10-21 PROCEDURE — 99214 OFFICE O/P EST MOD 30 MIN: CPT | Performed by: FAMILY MEDICINE

## 2021-10-21 PROCEDURE — G0439 PPPS, SUBSEQ VISIT: HCPCS | Performed by: FAMILY MEDICINE

## 2021-10-21 PROCEDURE — 90662 IIV NO PRSV INCREASED AG IM: CPT | Performed by: FAMILY MEDICINE

## 2021-10-21 PROCEDURE — 1123F ACP DISCUSS/DSCN MKR DOCD: CPT | Performed by: FAMILY MEDICINE

## 2021-10-21 PROCEDURE — G0008 ADMIN INFLUENZA VIRUS VAC: HCPCS | Performed by: FAMILY MEDICINE

## 2021-11-02 ENCOUNTER — APPOINTMENT (OUTPATIENT)
Dept: LAB | Facility: CLINIC | Age: 86
End: 2021-11-02
Payer: MEDICARE

## 2021-11-02 DIAGNOSIS — I10 ESSENTIAL HYPERTENSION: ICD-10-CM

## 2021-11-02 DIAGNOSIS — I48.91 ATRIAL FIBRILLATION, UNSPECIFIED TYPE (HCC): ICD-10-CM

## 2021-11-02 LAB
ALBUMIN SERPL BCP-MCNC: 3.2 G/DL (ref 3.5–5)
ALP SERPL-CCNC: 102 U/L (ref 46–116)
ALT SERPL W P-5'-P-CCNC: 15 U/L (ref 12–78)
ANION GAP SERPL CALCULATED.3IONS-SCNC: 0 MMOL/L (ref 4–13)
AST SERPL W P-5'-P-CCNC: 15 U/L (ref 5–45)
BILIRUB SERPL-MCNC: 0.72 MG/DL (ref 0.2–1)
BUN SERPL-MCNC: 19 MG/DL (ref 5–25)
CALCIUM ALBUM COR SERPL-MCNC: 9.5 MG/DL (ref 8.3–10.1)
CALCIUM SERPL-MCNC: 8.9 MG/DL (ref 8.3–10.1)
CHLORIDE SERPL-SCNC: 105 MMOL/L (ref 100–108)
CHOLEST SERPL-MCNC: 203 MG/DL (ref 50–200)
CO2 SERPL-SCNC: 32 MMOL/L (ref 21–32)
CREAT SERPL-MCNC: 1.04 MG/DL (ref 0.6–1.3)
GFR SERPL CREATININE-BSD FRML MDRD: 46 ML/MIN/1.73SQ M
GLUCOSE P FAST SERPL-MCNC: 88 MG/DL (ref 65–99)
HDLC SERPL-MCNC: 49 MG/DL
LDLC SERPL CALC-MCNC: 124 MG/DL (ref 0–100)
NONHDLC SERPL-MCNC: 154 MG/DL
POTASSIUM SERPL-SCNC: 4.1 MMOL/L (ref 3.5–5.3)
PROT SERPL-MCNC: 7.2 G/DL (ref 6.4–8.2)
SODIUM SERPL-SCNC: 137 MMOL/L (ref 136–145)
TRIGL SERPL-MCNC: 152 MG/DL
TSH SERPL DL<=0.05 MIU/L-ACNC: 2.54 UIU/ML (ref 0.36–3.74)

## 2021-11-02 PROCEDURE — 84443 ASSAY THYROID STIM HORMONE: CPT

## 2021-11-02 PROCEDURE — 36415 COLL VENOUS BLD VENIPUNCTURE: CPT

## 2021-11-02 PROCEDURE — 80061 LIPID PANEL: CPT

## 2021-11-02 PROCEDURE — 80053 COMPREHEN METABOLIC PANEL: CPT

## 2021-11-08 DIAGNOSIS — F41.9 ANXIETY: ICD-10-CM

## 2021-11-08 RX ORDER — ALPRAZOLAM 0.25 MG/1
TABLET ORAL
Qty: 60 TABLET | Refills: 5 | Status: SHIPPED | OUTPATIENT
Start: 2021-11-08

## 2021-12-08 DIAGNOSIS — F41.9 ANXIETY: ICD-10-CM

## 2021-12-08 RX ORDER — ESCITALOPRAM OXALATE 5 MG/1
TABLET ORAL
Qty: 30 TABLET | Refills: 5 | Status: SHIPPED | OUTPATIENT
Start: 2021-12-08 | End: 2022-06-09

## 2021-12-29 ENCOUNTER — TELEPHONE (OUTPATIENT)
Dept: FAMILY MEDICINE CLINIC | Facility: CLINIC | Age: 86
End: 2021-12-29

## 2021-12-29 DIAGNOSIS — G89.29 CHRONIC PAIN OF BOTH KNEES: Primary | ICD-10-CM

## 2021-12-29 DIAGNOSIS — M25.561 CHRONIC PAIN OF BOTH KNEES: Primary | ICD-10-CM

## 2021-12-29 DIAGNOSIS — M25.562 CHRONIC PAIN OF BOTH KNEES: Primary | ICD-10-CM

## 2022-01-08 DIAGNOSIS — K21.9 GASTROESOPHAGEAL REFLUX DISEASE: ICD-10-CM

## 2022-01-08 DIAGNOSIS — F32.89 OTHER DEPRESSION: ICD-10-CM

## 2022-01-08 DIAGNOSIS — J45.909 ASTHMA DUE TO ENVIRONMENTAL ALLERGIES: ICD-10-CM

## 2022-01-10 RX ORDER — MIRTAZAPINE 15 MG/1
15 TABLET, FILM COATED ORAL
Qty: 90 TABLET | Refills: 3 | Status: SHIPPED | OUTPATIENT
Start: 2022-01-10

## 2022-01-10 RX ORDER — RIVAROXABAN 20 MG/1
20 TABLET, FILM COATED ORAL DAILY
Qty: 30 TABLET | Refills: 11 | Status: SHIPPED | OUTPATIENT
Start: 2022-01-10

## 2022-01-12 DIAGNOSIS — K21.9 GASTROESOPHAGEAL REFLUX DISEASE: ICD-10-CM

## 2022-01-12 DIAGNOSIS — F32.89 OTHER DEPRESSION: ICD-10-CM

## 2022-01-12 DIAGNOSIS — J45.909 ASTHMA DUE TO ENVIRONMENTAL ALLERGIES: ICD-10-CM

## 2022-01-12 RX ORDER — ESOMEPRAZOLE MAGNESIUM 40 MG/1
40 CAPSULE, DELAYED RELEASE ORAL DAILY
Qty: 30 CAPSULE | Refills: 5 | Status: SHIPPED | OUTPATIENT
Start: 2022-01-12 | End: 2022-07-04

## 2022-01-12 RX ORDER — ESOMEPRAZOLE MAGNESIUM 40 MG/1
40 CAPSULE, DELAYED RELEASE ORAL DAILY
Qty: 90 CAPSULE | Refills: 1 | OUTPATIENT
Start: 2022-01-12

## 2022-01-12 RX ORDER — MIRTAZAPINE 15 MG/1
15 TABLET, FILM COATED ORAL
Qty: 90 TABLET | Refills: 3 | OUTPATIENT
Start: 2022-01-12

## 2022-01-12 RX ORDER — MONTELUKAST SODIUM 10 MG/1
10 TABLET ORAL DAILY
Qty: 90 TABLET | Refills: 1 | OUTPATIENT
Start: 2022-01-12

## 2022-01-12 RX ORDER — MONTELUKAST SODIUM 10 MG/1
10 TABLET ORAL DAILY
Qty: 30 TABLET | Refills: 5 | Status: SHIPPED | OUTPATIENT
Start: 2022-01-12 | End: 2022-07-04

## 2022-03-08 DIAGNOSIS — R60.0 LOCALIZED EDEMA: ICD-10-CM

## 2022-03-08 RX ORDER — FUROSEMIDE 40 MG/1
40 TABLET ORAL DAILY
Qty: 30 TABLET | Refills: 5 | Status: SHIPPED | OUTPATIENT
Start: 2022-03-08

## 2022-04-26 ENCOUNTER — TELEPHONE (OUTPATIENT)
Dept: OTHER | Facility: OTHER | Age: 87
End: 2022-04-26

## 2022-04-26 NOTE — TELEPHONE ENCOUNTER
Patient called in to cancel appointment for 4/27 at 10:30 AM   Her ride to the office is sick  Please follow up with patient to reschedule appointment

## 2022-05-05 ENCOUNTER — OFFICE VISIT (OUTPATIENT)
Dept: FAMILY MEDICINE CLINIC | Facility: CLINIC | Age: 87
End: 2022-05-05
Payer: MEDICARE

## 2022-05-05 VITALS
SYSTOLIC BLOOD PRESSURE: 140 MMHG | BODY MASS INDEX: 26.43 KG/M2 | DIASTOLIC BLOOD PRESSURE: 72 MMHG | WEIGHT: 140 LBS | OXYGEN SATURATION: 98 % | TEMPERATURE: 98.2 F | HEIGHT: 61 IN

## 2022-05-05 DIAGNOSIS — M25.561 CHRONIC PAIN OF BOTH KNEES: Primary | ICD-10-CM

## 2022-05-05 DIAGNOSIS — R60.0 LOWER EXTREMITY EDEMA: ICD-10-CM

## 2022-05-05 DIAGNOSIS — N18.32 STAGE 3B CHRONIC KIDNEY DISEASE (HCC): ICD-10-CM

## 2022-05-05 DIAGNOSIS — I10 ESSENTIAL HYPERTENSION: ICD-10-CM

## 2022-05-05 DIAGNOSIS — G89.29 CHRONIC PAIN OF BOTH KNEES: Primary | ICD-10-CM

## 2022-05-05 DIAGNOSIS — E78.2 MIXED HYPERLIPIDEMIA: ICD-10-CM

## 2022-05-05 DIAGNOSIS — Z95.0 PACEMAKER: ICD-10-CM

## 2022-05-05 DIAGNOSIS — M25.562 CHRONIC PAIN OF BOTH KNEES: Primary | ICD-10-CM

## 2022-05-05 DIAGNOSIS — F41.9 ANXIETY: ICD-10-CM

## 2022-05-05 PROBLEM — R10.84 GENERALIZED ABDOMINAL PAIN: Status: RESOLVED | Noted: 2019-05-14 | Resolved: 2022-05-05

## 2022-05-05 PROBLEM — I89.0 LYMPHEDEMA: Status: RESOLVED | Noted: 2018-02-02 | Resolved: 2022-05-05

## 2022-05-05 PROCEDURE — 99214 OFFICE O/P EST MOD 30 MIN: CPT | Performed by: FAMILY MEDICINE

## 2022-05-05 NOTE — PROGRESS NOTES
Assessment/Plan:    1  Chronic pain of both knees  - using topical patches and okay with this  Acknowledges she is not a surgical candidate  2  Stage 3b chronic kidney disease ([address] )  - cont to monitor and avoid nephrotoxic agents  3  Essential hypertension  - stable on recheck, higher, initially  It appears as this was the case with her cardiologist as well  She is on low dose ARB and has been well maintained  Will cont to monitor  Discussed the importance of maintaining a healthy lifestyle through heart healthy diet and exercise  - Basic metabolic panel; Future    4  Mixed hyperlipidemia  - not on statin and has not been  No reported SEs  She declines this at this point given her age  She does follow with cardiology as well  5  Lower extremity edema  - stable  She is on diuretics and her weight is actually down 3# from the Fall  Anxiety - she is feeling a bit anxious today  She is on xanax for this and only takes sparingly  She did take one this AM due to her symptoms  She does not need refills  This helps improve her quality of life  She is also on daily lexapro  She only has one brother living, he is younger than she is  She had 7 siblings, otherwise and they are all   She has an "adopted" daughter that helps her and helps to support her  Will have her sign SMA in f/u - left prior to us doing this today  6  BMI 26 0-26 9,adult  - BMI Counseling: Body mass index is 26 45 kg/m²  The BMI is above normal  Nutrition recommendations include decreasing portion sizes, encouraging healthy choices of fruits and vegetables, decreasing fast food intake, consuming healthier snacks, limiting drinks that contain sugar, reducing intake of saturated and trans fat and reducing intake of cholesterol  Exercise recommendations include exercising 3-5 times per week and strength training exercises  Rationale for BMI follow-up plan is due to patient being overweight or obese  RTC in 6 months or sooner prn    Patient/Caretaker verbalized understanding and were in agreement with today's assessment and plan  Time was taken to address any questions patient/caretaker had  Indication/Risks/Benefits of medication(s) as prescribed were discussed with the patient/caretaker  The patient verbalized understanding and agreement and elects to take medications as prescribed  Time was taken to answer any questions the patient/caretaker may have had  Chief Complaint   Patient presents with    Annual Exam     not feeling like herself       Subjective:      Patient ID: Vero Cespedes is a 80 y o  female  She is feeling more anxious today  She has these days  Has xanax for these days and took one this AM   She is without si/hi  There is an "adoptd" daughter with her today who is helpful for her  She is living alone and does her own ADLs  She gets lonely  She is also taking lexapro, daily  Does not miss doses  She does have b/l knee pain  She uses lidocaine patch for this and it makes it bearable for her  She has a cane and walker and this is since her trauma a few years ago where she broke her hip and shoulder  She feels stable overall with these  On board with [facility] for CAD/Heart Block, with pacemaker/HTN  No cp, worsening sob/cerna or edema to report  The following portions of the patient's history were reviewed and updated as appropriate: allergies, current medications, past family history, past medical history, past social history, past surgical history and problem list     Review of Systems   All other systems reviewed and are negative  Objective:      /72 (BP Location: Left arm, Patient Position: Sitting, Cuff Size: Standard)   Temp 98 2 °F (36 8 °C) (Temporal)   Ht 5' 1" (1 549 m)   Wt 63 5 kg (140 lb)   SpO2 98%   BMI 26 45 kg/m²          Physical Exam  Constitutional:       General: She is not in acute distress  Appearance: Normal appearance  She is not ill-appearing  HENT:      Head: Normocephalic and atraumatic  Cardiovascular:      Rate and Rhythm: Normal rate and regular rhythm  Pulmonary:      Effort: Pulmonary effort is normal       Breath sounds: Normal breath sounds  No wheezing, rhonchi or rales  Musculoskeletal:      Cervical back: Neck supple  Comments: Thoracic kyphosis noted  She is using a cane in her R hand today   She is with antalgic gait, slow to transition but stable    Skin:     General: Skin is warm and dry  Neurological:      General: No focal deficit present  Mental Status: She is alert and oriented to person, place, and time  Psychiatric:         Mood and Affect: Mood normal          Behavior: Behavior normal          Thought Content:  Thought content normal          Judgment: Judgment normal

## 2022-05-05 NOTE — PATIENT INSTRUCTIONS
Heart Healthy Diet   WHAT YOU NEED TO KNOW:   A heart healthy diet is an eating plan low in unhealthy fats and sodium (salt)  The plan is high in healthy fats and fiber  A heart healthy diet helps improve your cholesterol levels and lowers your risk for heart disease and stroke  A dietitian will teach you how to read and understand food labels  DISCHARGE INSTRUCTIONS:   Heart healthy diet guidelines to follow:   · Choose foods that contain healthy fats  ? Unsaturated fats  include monounsaturated and polyunsaturated fats  Unsaturated fat is found in foods such as soybean, canola, olive, corn, and safflower oils  It is also found in soft tub margarine that is made with liquid vegetable oil  ? Omega-3 fat  is found in certain fish, such as salmon, tuna, and trout, and in walnuts and flaxseed  Eat fish high in omega-3 fats at least 2 times a week  · Get 20 to 30 grams of fiber each day  Fruits, vegetables, whole-grain foods, and legumes (cooked beans) are good sources of fiber  · Limit or do not have unhealthy fats  ? Cholesterol  is found in animal foods, such as eggs and lobster, and in dairy products made from whole milk  Limit cholesterol to less than 200 mg each day  ? Saturated fat  is found in meats, such as powell and hamburger  It is also found in chicken or turkey skin, whole milk, and butter  Limit saturated fat to less than 7% of your total daily calories  ? Trans fat  is found in packaged foods, such as potato chips and cookies  It is also in hard margarine, some fried foods, and shortening  Do not eat foods that contain trans fats  · Limit sodium as directed  You may be told to limit sodium to 2,000 to 2,300 mg each day  Choose low-sodium or no-salt-added foods  Add little or no salt to food you prepare  Use herbs and spices in place of salt         Include the following in your heart healthy plan:  Ask your dietitian or healthcare provider how many servings to have from each of the following food groups:  · Grains:      ? Whole-wheat breads, cereals, and pastas, and brown rice    ? Low-fat, low-sodium crackers and chips    · Vegetables:      ? Broccoli, green beans, green peas, and spinach    ? Collards, kale, and lima beans    ? Carrots, sweet potatoes, tomatoes, and peppers    ? Canned vegetables with no salt added    · Fruits:      ? Bananas, peaches, pears, and pineapple    ? Grapes, raisins, and dates    ? Oranges, tangerines, grapefruit, orange juice, and grapefruit juice    ? Apricots, mangoes, melons, and papaya    ? Raspberries and strawberries    ? Canned fruit with no added sugar    · Low-fat dairy:      ? Nonfat (skim) milk, 1% milk, and low-fat almond, cashew, or soy milks fortified with calcium    ? Low-fat cheese, regular or frozen yogurt, and cottage cheese    · Meats and proteins:      ? Lean cuts of beef and pork (loin, leg, round), skinless chicken and turkey    ? Legumes, soy products, egg whites, or nuts    Limit or do not include the following in your heart healthy plan:   · Unhealthy fats and oils:      ? Whole or 2% milk, cream cheese, sour cream, or cheese    ? High-fat cuts of beef (T-bone steaks, ribs), chicken or turkey with skin, and organ meats such as liver    ? Butter, stick margarine, shortening, and cooking oils such as coconut or palm oil    · Foods and liquids high in sodium:      ? Packaged foods, such as frozen dinners, cookies, macaroni and cheese, and cereals with more than 300 mg of sodium per serving    ? Vegetables with added sodium, such as instant potatoes, vegetables with added sauces, or regular canned vegetables    ? Cured or smoked meats, such as hot dogs, powell, and sausage    ? High-sodium ketchup, barbecue sauce, salad dressing, pickles, olives, soy sauce, or miso    · Foods and liquids high in sugar:      ? Candy, cake, cookies, pies, or doughnuts    ? Soft drinks (soda), sports drinks, or sweetened tea    ?  Canned or dry mixes for cakes, soups, sauces, or gravies    Other healthy heart guidelines:   · Do not smoke  Nicotine and other chemicals in cigarettes and cigars can cause lung and heart damage  Ask your healthcare provider for information if you currently smoke and need help to quit  E-cigarettes or smokeless tobacco still contain nicotine  Talk to your healthcare provider before you use these products  · Limit or do not drink alcohol as directed  Alcohol can damage your heart and raise your blood pressure  Your healthcare provider may give you specific daily and weekly limits  The general recommended limit is 1 drink a day for women 21 or older and for men 72 or older  Do not have more than 3 drinks in a day or 7 in a week  The recommended limit is 2 drinks a day for men 24to 59years of age  Do not have more than 4 drinks in a day or 14 in a week  A drink of alcohol is 12 ounces of beer, 5 ounces of wine, or 1½ ounces of liquor  · Exercise regularly  Exercise can help you maintain a healthy weight and improve your blood pressure and cholesterol levels  Regular exercise can also decrease your risk for heart problems  Ask your healthcare provider about the best exercise plan for you  Do not start an exercise program without asking your healthcare provider  Follow up with your doctor or cardiologist as directed:  Write down your questions so you remember to ask them during your visits  © Copyright [employer] 2022 Information is for End User's use only and may not be sold, redistributed or otherwise used for commercial purposes  All illustrations and images included in CareNotes® are the copyrighted property of A [employer] A M [employer] Inc  or [facility]   The above information is an  only  It is not intended as medical advice for individual conditions or treatments  Talk to your doctor, nurse or pharmacist before following any medical regimen to see if it is safe and effective for you

## 2022-05-09 DIAGNOSIS — R60.0 LOCALIZED EDEMA: ICD-10-CM

## 2022-05-09 RX ORDER — POTASSIUM CHLORIDE 20 MEQ/1
TABLET, EXTENDED RELEASE ORAL
Qty: 30 TABLET | Refills: 5 | Status: SHIPPED | OUTPATIENT
Start: 2022-05-09

## 2022-06-03 ENCOUNTER — TELEPHONE (OUTPATIENT)
Dept: FAMILY MEDICINE CLINIC | Facility: CLINIC | Age: 87
End: 2022-06-03

## 2022-06-03 DIAGNOSIS — J32.9 SINUSITIS, UNSPECIFIED CHRONICITY, UNSPECIFIED LOCATION: Primary | ICD-10-CM

## 2022-06-03 RX ORDER — AMOXICILLIN 500 MG/1
500 CAPSULE ORAL EVERY 8 HOURS SCHEDULED
Qty: 21 CAPSULE | Refills: 0 | Status: SHIPPED | OUTPATIENT
Start: 2022-06-03 | End: 2022-06-10

## 2022-06-03 NOTE — TELEPHONE ENCOUNTER
Pt called asking for ABX to [facility] for sinus infection - 'its all in my sinuses', has seen her ENT for these but is unable to get appt with them in the near future

## 2022-06-09 DIAGNOSIS — F41.9 ANXIETY: ICD-10-CM

## 2022-06-09 RX ORDER — ESCITALOPRAM OXALATE 5 MG/1
TABLET ORAL
Qty: 30 TABLET | Refills: 5 | Status: SHIPPED | OUTPATIENT
Start: 2022-06-09

## 2022-06-28 DIAGNOSIS — B02.29 PHN (POSTHERPETIC NEURALGIA): ICD-10-CM

## 2022-06-28 RX ORDER — LIDOCAINE 50 MG/G
1 PATCH TOPICAL DAILY
Qty: 30 PATCH | Refills: 5 | Status: SHIPPED | OUTPATIENT
Start: 2022-06-28

## 2022-07-04 DIAGNOSIS — K21.9 GASTROESOPHAGEAL REFLUX DISEASE: ICD-10-CM

## 2022-07-04 DIAGNOSIS — J45.909 ASTHMA DUE TO ENVIRONMENTAL ALLERGIES: ICD-10-CM

## 2022-07-04 RX ORDER — MONTELUKAST SODIUM 10 MG/1
10 TABLET ORAL DAILY
Qty: 30 TABLET | Refills: 5 | Status: SHIPPED | OUTPATIENT
Start: 2022-07-04 | End: 2022-09-23 | Stop reason: SDUPTHER

## 2022-07-04 RX ORDER — ESOMEPRAZOLE MAGNESIUM 40 MG/1
40 CAPSULE, DELAYED RELEASE ORAL DAILY
Qty: 30 CAPSULE | Refills: 5 | Status: SHIPPED | OUTPATIENT
Start: 2022-07-04

## 2022-07-22 ENCOUNTER — OFFICE VISIT (OUTPATIENT)
Dept: FAMILY MEDICINE CLINIC | Facility: CLINIC | Age: 87
End: 2022-07-22
Payer: MEDICARE

## 2022-07-22 VITALS
HEIGHT: 61 IN | HEART RATE: 65 BPM | RESPIRATION RATE: 16 BRPM | BODY MASS INDEX: 26.36 KG/M2 | DIASTOLIC BLOOD PRESSURE: 60 MMHG | OXYGEN SATURATION: 96 % | WEIGHT: 139.6 LBS | TEMPERATURE: 97.7 F | SYSTOLIC BLOOD PRESSURE: 118 MMHG

## 2022-07-22 DIAGNOSIS — I10 ESSENTIAL HYPERTENSION: ICD-10-CM

## 2022-07-22 DIAGNOSIS — M25.562 CHRONIC PAIN OF BOTH KNEES: ICD-10-CM

## 2022-07-22 DIAGNOSIS — E78.2 MIXED HYPERLIPIDEMIA: ICD-10-CM

## 2022-07-22 DIAGNOSIS — S80.219A ABRASION OF KNEE, UNSPECIFIED LATERALITY, INITIAL ENCOUNTER: ICD-10-CM

## 2022-07-22 DIAGNOSIS — E53.8 B12 DEFICIENCY: ICD-10-CM

## 2022-07-22 DIAGNOSIS — I82.4Z9 DEEP VEIN THROMBOSIS (DVT) OF DISTAL VEIN OF LOWER EXTREMITY, UNSPECIFIED CHRONICITY, UNSPECIFIED LATERALITY (HCC): ICD-10-CM

## 2022-07-22 DIAGNOSIS — J32.9 SINUSITIS, UNSPECIFIED CHRONICITY, UNSPECIFIED LOCATION: Primary | ICD-10-CM

## 2022-07-22 DIAGNOSIS — S06.5XAA SDH (SUBDURAL HEMATOMA): ICD-10-CM

## 2022-07-22 DIAGNOSIS — G89.29 CHRONIC PAIN OF BOTH KNEES: ICD-10-CM

## 2022-07-22 DIAGNOSIS — E55.9 VITAMIN D DEFICIENCY: ICD-10-CM

## 2022-07-22 DIAGNOSIS — I48.91 ATRIAL FIBRILLATION, UNSPECIFIED TYPE (HCC): ICD-10-CM

## 2022-07-22 DIAGNOSIS — M25.561 CHRONIC PAIN OF BOTH KNEES: ICD-10-CM

## 2022-07-22 PROCEDURE — 99213 OFFICE O/P EST LOW 20 MIN: CPT | Performed by: FAMILY MEDICINE

## 2022-07-22 RX ORDER — MONTELUKAST SODIUM 10 MG/1
10 TABLET ORAL
Qty: 90 TABLET | Refills: 3 | Status: SHIPPED | OUTPATIENT
Start: 2022-07-22

## 2022-07-22 NOTE — PROGRESS NOTES
Assessment/Plan:    No problem-specific Assessment & Plan notes found for this encounter  Diagnoses and all orders for this visit:    Sinusitis, unspecified chronicity, unspecified location  -     CBC and differential; Future  -     TSH, 3rd generation; Future  -     Vitamin B12; Future  -     Vitamin D 25 hydroxy; Future  -     Ambulatory Referral to Orthopedic Surgery; Future  -     montelukast (SINGULAIR) 10 mg tablet; Take 1 tablet (10 mg total) by mouth daily at bedtime    Essential hypertension  -     CBC and differential; Future  -     TSH, 3rd generation; Future  -     Vitamin B12; Future  -     Vitamin D 25 hydroxy; Future  -     Ambulatory Referral to Orthopedic Surgery; Future    Mixed hyperlipidemia  -     CBC and differential; Future  -     TSH, 3rd generation; Future  -     Vitamin B12; Future  -     Vitamin D 25 hydroxy; Future  -     Ambulatory Referral to Orthopedic Surgery; Future    Deep vein thrombosis (DVT) of distal vein of lower extremity, unspecified chronicity, unspecified laterality (HCC)  -     Vitamin B12; Future  -     Vitamin D 25 hydroxy; Future  -     Ambulatory Referral to Orthopedic Surgery; Future    Atrial fibrillation, unspecified type (HCC)  -     Vitamin B12; Future  -     Vitamin D 25 hydroxy; Future  -     Ambulatory Referral to Orthopedic Surgery; Future    SDH (subdural hematoma) (HCC)  -     Vitamin B12; Future  -     Vitamin D 25 hydroxy; Future  -     Ambulatory Referral to Orthopedic Surgery; Future    Vitamin D deficiency  -     Vitamin B12; Future  -     Vitamin D 25 hydroxy; Future  -     Ambulatory Referral to Orthopedic Surgery; Future    B12 deficiency  -     Vitamin B12; Future  -     Vitamin D 25 hydroxy; Future  -     Ambulatory Referral to Orthopedic Surgery; Future    Abrasion of knee, unspecified laterality, initial encounter  -     Ambulatory Referral to Orthopedic Surgery;  Future    Chronic pain of both knees  -     Ambulatory Referral to Orthopedic Surgery; Future  -     XR knee 4+ vw right injury; Future  -     XR knee 4+ vw right injury; Future          Subjective:      Patient ID: Margareth Posadas is a 80 y o  female  She is chronically anticoagulated  She rubbed her nose and developed a right sided nose bleed  She had to be taken to the ER  EMS could not stop the bleeding  She was cauterized  She has no further symptoms  The following portions of the patient's history were reviewed and updated as appropriate:   She  has a past medical history of Abnormal electrocardiogram, Arthritis, Atrial fibrillation (HCC), Complete atrioventricular block ([address] ), Diverticulosis, GERD (gastroesophageal reflux disease), Glaucoma, Hiatal hernia, Hypertension, Intermittent claudication ([address] ), Macular degeneration, Pericardial effusion, and Pulmonary embolism ([address] )    She   Patient Active Problem List    Diagnosis Date Noted    BMI 26 0-26 9,adult 05/05/2022    Lower extremity edema 05/05/2022    Stage 3b chronic kidney disease ([address] ) 10/21/2021    Medicare annual wellness visit, subsequent 11/12/2018    Chronic pain of both knees 07/12/2018    Constipation 02/02/2018    Subarachnoid hemorrhage following injury, with loss of consciousness ([address] ) 11/21/2017    Closed 4-part fracture of proximal end of left humerus with routine healing 10/27/2017    Depressed 10/27/2017    Fracture of femoral neck, left ([address] ) 10/27/2017    Fracture of left humerus 10/27/2017    Gait abnormality 10/27/2017    Pacemaker 10/27/2017    S/P hip hemiarthroplasty 10/27/2017    SAH (subarachnoid hemorrhage) ([address] ) 10/27/2017    SDH (subdural hematoma) ([address] ) 10/27/2017    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    Memory impairment 10/25/2017    Atrial fibrillation ([address] ) 10/20/2017    Closed fracture of maxillary sinus ([address] ) 10/20/2017    History of DVT (deep vein thrombosis) 10/20/2017    Hx pulmonary embolism 10/20/2017    Hip fracture ([address] ) 10/19/2017    PHN (postherpetic neuralgia) 02/23/2017    Hemorrhage of anus and rectum 06/30/2016    Chronic GERD 06/15/2016    Ambulatory dysfunction 04/12/2016    Joint pain, hip 01/04/2016    Trochanteric bursitis of left hip 01/04/2016    Enteritis 12/15/2015    Diaphragmatic hernia without obstruction or gangrene 06/01/2015    Seborrheic keratosis 01/23/2015    Essential hypertension 57/34/6320    Diastolic dysfunction 16/50/9085    Fatigue 07/15/2014    Anemia 11/14/2013    Heart block 11/14/2013    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) 09/10/2013    Sciatica 09/10/2013    Backache 08/30/2013    Vertigo 08/08/2013    Leg swelling 08/01/2013    Deep venous thrombosis of distal lower extremity ([facility] ) 11/20/2012    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, unspecified laterality 11/20/2012    Osteoarthritis of knee 11/20/2012    Osteoporosis 11/20/2012    Anxiety 11/16/2012    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    Benign essential hypertension 11/16/2012    Hyperlipidemia 11/16/2012    Osteoarthritis 11/16/2012     She  has a past surgical history that includes Appendectomy; Hysterectomy; and Tonsillectomy and adenoidectomy  Her family history includes Alzheimer's disease in her sister; Colon cancer in her sister; Diabetes in her father; Heart disease in her father; Hypertension in her father and mother  She  reports that she has never smoked  She has never used smokeless tobacco  She reports that she does not drink alcohol and does not use drugs    Current Outpatient Medications   Medication Sig Dispense Refill    acetaminophen (TYLENOL) 500 mg tablet Take 1,000 mg by mouth daily as needed       ALPRAZolam (XANAX) 0 25 mg tablet TAKE 1 TABLET BY MOUTH EVERY 12 HOURS AS NEEDED FOR ANXIETY 60 tablet 5    Benzocaine-Menthol 15-10 MG LOZG Apply 1 lozenge to the mouth or throat every 2 (two) hours as needed (sore throat) 15 lozenge 5    calcium-vitamin D 250-100 MG-UNIT per tablet Take 1 tablet by mouth 2 (two) times a day      Diclofenac Sodium (VOLTAREN) 1 % Apply 1 application topically 4 (four) times a day      escitalopram (LEXAPRO) 5 mg tablet take 1 tablet by mouth once daily 30 tablet 5    esomeprazole (NexIUM) 40 MG capsule TAKE 1 CAPSULE (40 MG TOTAL) BY MOUTH DAILY 30 capsule 5    fluticasone (FLONASE) 50 mcg/act nasal spray USE 2 SPRAYS EACH NOSTRIL EVERY DAY 16 g 2    furosemide (LASIX) 40 mg tablet TAKE 1 TABLET (40 MG TOTAL) BY MOUTH DAILY 30 tablet 5    lidocaine (LIDODERM) 5 % Apply 1 patch topically daily Remove & Discard patch within 12 hours or as directed by MD 30 patch 5    Melatonin 10 MG TABS Take 3 mg by mouth        mirtazapine (REMERON) 15 mg tablet TAKE 1 TABLET (15 MG TOTAL) BY MOUTH DAILY AT BEDTIME 90 tablet 3    mometasone (ELOCON) 0 1 % cream       montelukast (SINGULAIR) 10 mg tablet TAKE 1 TABLET (10 MG TOTAL) BY MOUTH DAILY 30 tablet 5    montelukast (SINGULAIR) 10 mg tablet Take 1 tablet (10 mg total) by mouth daily at bedtime 90 tablet 3    multivitamin (THERAGRAN) TABS Take 1 tablet by mouth daily      olmesartan (BENICAR) 5 mg tablet Take 5 mg by mouth daily      potassium chloride (K-DUR,KLOR-CON) 20 mEq tablet take 1 tablet by mouth once daily 30 tablet 5    senna-docusate sodium (SENOKOT-S) 8 6-50 mg per tablet Take 1 tablet by mouth daily as needed for constipation 30 tablet 5    triamcinolone (KENALOG) 0 1 % ointment APPLY TWICE A DAY TO AFFECTED AREA X1-2 WEEKS  0    Xarelto 20 MG tablet TAKE 1 TABLET (20 MG TOTAL) BY MOUTH DAILY 30 tablet 11     No current facility-administered medications for this visit       Current Outpatient Medications on File Prior to Visit   Medication Sig    acetaminophen (TYLENOL) 500 mg tablet Take 1,000 mg by mouth daily as needed     ALPRAZolam (XANAX) 0 25 mg tablet TAKE 1 TABLET BY MOUTH EVERY 12 HOURS AS NEEDED FOR ANXIETY    Benzocaine-Menthol 15-10 MG LOZG Apply 1 lozenge to the mouth or throat every 2 (two) hours as needed (sore throat)    calcium-vitamin D 250-100 MG-UNIT per tablet Take 1 tablet by mouth 2 (two) times a day    Diclofenac Sodium (VOLTAREN) 1 % Apply 1 application topically 4 (four) times a day    escitalopram (LEXAPRO) 5 mg tablet take 1 tablet by mouth once daily    esomeprazole (NexIUM) 40 MG capsule TAKE 1 CAPSULE (40 MG TOTAL) BY MOUTH DAILY    fluticasone (FLONASE) 50 mcg/act nasal spray USE 2 SPRAYS EACH NOSTRIL EVERY DAY    furosemide (LASIX) 40 mg tablet TAKE 1 TABLET (40 MG TOTAL) BY MOUTH DAILY    lidocaine (LIDODERM) 5 % Apply 1 patch topically daily Remove & Discard patch within 12 hours or as directed by MD    Melatonin 10 MG TABS Take 3 mg by mouth      mirtazapine (REMERON) 15 mg tablet TAKE 1 TABLET (15 MG TOTAL) BY MOUTH DAILY AT BEDTIME    mometasone (ELOCON) 0 1 % cream     montelukast (SINGULAIR) 10 mg tablet TAKE 1 TABLET (10 MG TOTAL) BY MOUTH DAILY    multivitamin (THERAGRAN) TABS Take 1 tablet by mouth daily    olmesartan (BENICAR) 5 mg tablet Take 5 mg by mouth daily    potassium chloride (K-DUR,KLOR-CON) 20 mEq tablet take 1 tablet by mouth once daily    senna-docusate sodium (SENOKOT-S) 8 6-50 mg per tablet Take 1 tablet by mouth daily as needed for constipation    triamcinolone (KENALOG) 0 1 % ointment APPLY TWICE A DAY TO AFFECTED AREA X1-2 WEEKS    Xarelto 20 MG tablet TAKE 1 TABLET (20 MG TOTAL) BY MOUTH DAILY     No current facility-administered medications on file prior to visit  She is allergic to codeine       Review of Systems   All other systems reviewed and are negative  Objective:      /60 (BP Location: Left arm, Patient Position: Sitting, Cuff Size: Large)   Pulse 65   Temp 97 7 °F (36 5 °C) (Temporal)   Resp 16   Ht 5' 1" (1 549 m)   Wt 63 3 kg (139 lb 9 6 oz)   SpO2 96%   BMI 26 38 kg/m²          Physical Exam  Vitals and nursing note reviewed     Constitutional:       Appearance: Normal appearance  She is normal weight  Cardiovascular:      Rate and Rhythm: Normal rate and regular rhythm  Pulses: Normal pulses  Heart sounds: Normal heart sounds  Pulmonary:      Effort: Pulmonary effort is normal       Breath sounds: Normal breath sounds  Abdominal:      General: Abdomen is flat  Bowel sounds are normal       Palpations: Abdomen is soft  Musculoskeletal:         General: Normal range of motion  Cervical back: Normal range of motion and neck supple  Skin:     General: Skin is warm and dry  Capillary Refill: Capillary refill takes less than 2 seconds  Neurological:      General: No focal deficit present  Mental Status: She is alert and oriented to person, place, and time  Mental status is at baseline  Psychiatric:         Mood and Affect: Mood normal          Behavior: Behavior normal          Thought Content:  Thought content normal          Judgment: Judgment normal

## 2022-08-09 ENCOUNTER — APPOINTMENT (OUTPATIENT)
Dept: RADIOLOGY | Facility: CLINIC | Age: 87
End: 2022-08-09
Payer: MEDICARE

## 2022-08-09 ENCOUNTER — APPOINTMENT (OUTPATIENT)
Dept: LAB | Facility: CLINIC | Age: 87
End: 2022-08-09
Payer: MEDICARE

## 2022-08-09 ENCOUNTER — TELEPHONE (OUTPATIENT)
Dept: FAMILY MEDICINE CLINIC | Facility: CLINIC | Age: 87
End: 2022-08-09

## 2022-08-09 DIAGNOSIS — S06.5XAA SDH (SUBDURAL HEMATOMA): ICD-10-CM

## 2022-08-09 DIAGNOSIS — I48.91 ATRIAL FIBRILLATION, UNSPECIFIED TYPE (HCC): ICD-10-CM

## 2022-08-09 DIAGNOSIS — E55.9 VITAMIN D DEFICIENCY: ICD-10-CM

## 2022-08-09 DIAGNOSIS — M25.561 CHRONIC PAIN OF BOTH KNEES: Primary | ICD-10-CM

## 2022-08-09 DIAGNOSIS — J32.9 SINUSITIS, UNSPECIFIED CHRONICITY, UNSPECIFIED LOCATION: ICD-10-CM

## 2022-08-09 DIAGNOSIS — G89.29 CHRONIC PAIN OF BOTH KNEES: Primary | ICD-10-CM

## 2022-08-09 DIAGNOSIS — M25.561 CHRONIC PAIN OF BOTH KNEES: ICD-10-CM

## 2022-08-09 DIAGNOSIS — G89.29 CHRONIC PAIN OF BOTH KNEES: ICD-10-CM

## 2022-08-09 DIAGNOSIS — I10 ESSENTIAL HYPERTENSION: ICD-10-CM

## 2022-08-09 DIAGNOSIS — I82.4Z9 DEEP VEIN THROMBOSIS (DVT) OF DISTAL VEIN OF LOWER EXTREMITY, UNSPECIFIED CHRONICITY, UNSPECIFIED LATERALITY (HCC): ICD-10-CM

## 2022-08-09 DIAGNOSIS — M25.562 CHRONIC PAIN OF BOTH KNEES: Primary | ICD-10-CM

## 2022-08-09 DIAGNOSIS — E78.2 MIXED HYPERLIPIDEMIA: ICD-10-CM

## 2022-08-09 DIAGNOSIS — E53.8 B12 DEFICIENCY: ICD-10-CM

## 2022-08-09 DIAGNOSIS — M25.562 CHRONIC PAIN OF BOTH KNEES: ICD-10-CM

## 2022-08-09 LAB
BASOPHILS # BLD AUTO: 0.02 THOUSANDS/ΜL (ref 0–0.1)
BASOPHILS NFR BLD AUTO: 0 % (ref 0–1)
EOSINOPHIL # BLD AUTO: 0.08 THOUSAND/ΜL (ref 0–0.61)
EOSINOPHIL NFR BLD AUTO: 1 % (ref 0–6)
ERYTHROCYTE [DISTWIDTH] IN BLOOD BY AUTOMATED COUNT: 14.6 % (ref 11.6–15.1)
HCT VFR BLD AUTO: 39.4 % (ref 34.8–46.1)
HGB BLD-MCNC: 12.1 G/DL (ref 11.5–15.4)
IMM GRANULOCYTES # BLD AUTO: 0.01 THOUSAND/UL (ref 0–0.2)
IMM GRANULOCYTES NFR BLD AUTO: 0 % (ref 0–2)
LYMPHOCYTES # BLD AUTO: 1.48 THOUSANDS/ΜL (ref 0.6–4.47)
LYMPHOCYTES NFR BLD AUTO: 26 % (ref 14–44)
MCH RBC QN AUTO: 29 PG (ref 26.8–34.3)
MCHC RBC AUTO-ENTMCNC: 30.7 G/DL (ref 31.4–37.4)
MCV RBC AUTO: 95 FL (ref 82–98)
MONOCYTES # BLD AUTO: 0.55 THOUSAND/ΜL (ref 0.17–1.22)
MONOCYTES NFR BLD AUTO: 10 % (ref 4–12)
NEUTROPHILS # BLD AUTO: 3.6 THOUSANDS/ΜL (ref 1.85–7.62)
NEUTS SEG NFR BLD AUTO: 63 % (ref 43–75)
NRBC BLD AUTO-RTO: 0 /100 WBCS
PLATELET # BLD AUTO: 266 THOUSANDS/UL (ref 149–390)
PMV BLD AUTO: 11.3 FL (ref 8.9–12.7)
RBC # BLD AUTO: 4.17 MILLION/UL (ref 3.81–5.12)
WBC # BLD AUTO: 5.74 THOUSAND/UL (ref 4.31–10.16)

## 2022-08-09 PROCEDURE — 84443 ASSAY THYROID STIM HORMONE: CPT

## 2022-08-09 PROCEDURE — 80061 LIPID PANEL: CPT

## 2022-08-09 PROCEDURE — 84450 TRANSFERASE (AST) (SGOT): CPT

## 2022-08-09 PROCEDURE — 82607 VITAMIN B-12: CPT

## 2022-08-09 PROCEDURE — 80048 BASIC METABOLIC PNL TOTAL CA: CPT

## 2022-08-09 PROCEDURE — 73564 X-RAY EXAM KNEE 4 OR MORE: CPT

## 2022-08-09 PROCEDURE — 84460 ALANINE AMINO (ALT) (SGPT): CPT

## 2022-08-09 PROCEDURE — 85025 COMPLETE CBC W/AUTO DIFF WBC: CPT

## 2022-08-09 PROCEDURE — 36415 COLL VENOUS BLD VENIPUNCTURE: CPT

## 2022-08-09 PROCEDURE — 82306 VITAMIN D 25 HYDROXY: CPT

## 2022-08-10 LAB
25(OH)D3 SERPL-MCNC: 46.9 NG/ML (ref 30–100)
ALT SERPL W P-5'-P-CCNC: 16 U/L (ref 12–78)
ANION GAP SERPL CALCULATED.3IONS-SCNC: 4 MMOL/L (ref 4–13)
AST SERPL W P-5'-P-CCNC: 16 U/L (ref 5–45)
BUN SERPL-MCNC: 21 MG/DL (ref 5–25)
CALCIUM SERPL-MCNC: 9.2 MG/DL (ref 8.3–10.1)
CHLORIDE SERPL-SCNC: 103 MMOL/L (ref 96–108)
CHOLEST SERPL-MCNC: 158 MG/DL
CO2 SERPL-SCNC: 29 MMOL/L (ref 21–32)
CREAT SERPL-MCNC: 1.27 MG/DL (ref 0.6–1.3)
GFR SERPL CREATININE-BSD FRML MDRD: 36 ML/MIN/1.73SQ M
GLUCOSE P FAST SERPL-MCNC: 84 MG/DL (ref 65–99)
HDLC SERPL-MCNC: 48 MG/DL
LDLC SERPL CALC-MCNC: 88 MG/DL (ref 0–100)
POTASSIUM SERPL-SCNC: 3.7 MMOL/L (ref 3.5–5.3)
SODIUM SERPL-SCNC: 136 MMOL/L (ref 135–147)
TRIGL SERPL-MCNC: 111 MG/DL
TSH SERPL DL<=0.05 MIU/L-ACNC: 3.03 UIU/ML (ref 0.45–4.5)
VIT B12 SERPL-MCNC: 585 PG/ML (ref 100–900)

## 2022-08-19 ENCOUNTER — TELEPHONE (OUTPATIENT)
Dept: FAMILY MEDICINE CLINIC | Facility: CLINIC | Age: 87
End: 2022-08-19

## 2022-08-19 NOTE — TELEPHONE ENCOUNTER
Pt asking if there is an allergist you could recommend and refer pt to as her allergies are really bothering her

## 2022-08-29 ENCOUNTER — TELEPHONE (OUTPATIENT)
Dept: FAMILY MEDICINE CLINIC | Facility: CLINIC | Age: 87
End: 2022-08-29

## 2022-08-29 DIAGNOSIS — J32.9 SINUSITIS, UNSPECIFIED CHRONICITY, UNSPECIFIED LOCATION: Primary | ICD-10-CM

## 2022-08-29 RX ORDER — AMOXICILLIN 500 MG/1
500 CAPSULE ORAL EVERY 8 HOURS SCHEDULED
Qty: 21 CAPSULE | Refills: 0 | Status: SHIPPED | OUTPATIENT
Start: 2022-08-29 | End: 2022-09-05

## 2022-08-29 RX ORDER — FLUTICASONE PROPIONATE 50 MCG
2 SPRAY, SUSPENSION (ML) NASAL DAILY
Qty: 16 G | Refills: 5 | Status: SHIPPED | OUTPATIENT
Start: 2022-08-29 | End: 2022-09-23 | Stop reason: ALTCHOICE

## 2022-09-01 DIAGNOSIS — R60.0 LOCALIZED EDEMA: ICD-10-CM

## 2022-09-01 RX ORDER — FUROSEMIDE 40 MG/1
40 TABLET ORAL DAILY
Qty: 30 TABLET | Refills: 5 | Status: SHIPPED | OUTPATIENT
Start: 2022-09-01 | End: 2022-09-23 | Stop reason: DRUGHIGH

## 2022-09-23 ENCOUNTER — OFFICE VISIT (OUTPATIENT)
Dept: FAMILY MEDICINE CLINIC | Facility: CLINIC | Age: 87
End: 2022-09-23
Payer: MEDICARE

## 2022-09-23 VITALS
SYSTOLIC BLOOD PRESSURE: 110 MMHG | HEIGHT: 61 IN | HEART RATE: 60 BPM | TEMPERATURE: 97.9 F | RESPIRATION RATE: 16 BRPM | WEIGHT: 145 LBS | BODY MASS INDEX: 27.38 KG/M2 | DIASTOLIC BLOOD PRESSURE: 72 MMHG | OXYGEN SATURATION: 99 %

## 2022-09-23 DIAGNOSIS — R34 DECREASED URINE OUTPUT: ICD-10-CM

## 2022-09-23 DIAGNOSIS — D69.6 THROMBOCYTOPENIA (HCC): ICD-10-CM

## 2022-09-23 DIAGNOSIS — R04.0 EPISTAXIS: Primary | ICD-10-CM

## 2022-09-23 DIAGNOSIS — I50.32 CHRONIC DIASTOLIC HEART FAILURE (HCC): ICD-10-CM

## 2022-09-23 DIAGNOSIS — R68.83 CHILLS: ICD-10-CM

## 2022-09-23 DIAGNOSIS — R76.0 LUPUS ANTICOAGULANT POSITIVE: ICD-10-CM

## 2022-09-23 DIAGNOSIS — Z23 ENCOUNTER FOR IMMUNIZATION: ICD-10-CM

## 2022-09-23 DIAGNOSIS — R60.9 PERIPHERAL EDEMA: ICD-10-CM

## 2022-09-23 DIAGNOSIS — I82.5Y9 CHRONIC VENOUS EMBOLISM AND THROMBOSIS OF DEEP VESSELS OF PROXIMAL LOWER EXTREMITY, UNSPECIFIED LATERALITY (HCC): ICD-10-CM

## 2022-09-23 DIAGNOSIS — N18.32 STAGE 3B CHRONIC KIDNEY DISEASE (HCC): ICD-10-CM

## 2022-09-23 PROBLEM — I48.91 ATRIAL FIBRILLATION (HCC): Status: RESOLVED | Noted: 2017-10-20 | Resolved: 2022-09-23

## 2022-09-23 PROCEDURE — 90662 IIV NO PRSV INCREASED AG IM: CPT | Performed by: FAMILY MEDICINE

## 2022-09-23 PROCEDURE — 81001 URINALYSIS AUTO W/SCOPE: CPT | Performed by: FAMILY MEDICINE

## 2022-09-23 PROCEDURE — 99215 OFFICE O/P EST HI 40 MIN: CPT | Performed by: FAMILY MEDICINE

## 2022-09-23 PROCEDURE — G0008 ADMIN INFLUENZA VIRUS VAC: HCPCS | Performed by: FAMILY MEDICINE

## 2022-09-23 RX ORDER — FUROSEMIDE 40 MG/1
TABLET ORAL
Qty: 90 TABLET | Refills: 0 | Status: SHIPPED | OUTPATIENT
Start: 2022-09-23 | End: 2022-10-11 | Stop reason: ALTCHOICE

## 2022-09-23 NOTE — PROGRESS NOTES
Name: Emerald Galvez      : 1928      MRN: [id number]  Encounter Provider: Shawnee Tang DO  Encounter Date: 2022   Encounter department: [address] [place] [address]     1  Epistaxis  CBC and differential    Basic metabolic panel    Protime-INR    Ambulatory Referral to Hematology / Oncology   2  Thrombocytopenia (HCC)  CBC and differential    Basic metabolic panel    Protime-INR    Ambulatory Referral to Hematology / Oncology   3  Encounter for immunization  influenza vaccine, high-dose, PF 0 7 mL (FLUZONE HIGH-DOSE)    CANCELED: Hepatitis C antibody   4  Stage 3b chronic kidney disease ([facility] )     5  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, unspecified laterality [facility])  Ambulatory Referral to Hematology / Oncology   6  Chills  Urinalysis with microscopic    Urine culture    CANCELED: Urine culture    CANCELED: Urine culture   7  Decreased urine output  Urinalysis with microscopic    Urine culture    CANCELED: Urine culture    CANCELED: Urine culture   8  Peripheral edema  furosemide (LASIX) 40 mg tablet   9  Chronic diastolic heart failure ([facility] )     10  Lupus anticoagulant positive       This patient is not very well known to me as a patient of my partners here in the practice  She Is here acutely with epistaxis over the past couple of months, taking her to the ED  It is the R nostril  She went to ED yesterday and per chart review, there were low Plt -- 73 down from 266 in early August   Normal h/o, no LFTs were obtained  Pt/INR was mildly increased  No further w/u nor was this noted in the ED  She is on Xarelto for chronic LE DVT and has seen hematology -- per EMR note in  she had PE as well and has lupus anticoagulant  They recommended chronic anticoagulation  The patient has been well maintained on initially coumadin, eventually switched to Xarelto for many years    She has not had bleeding or clots to report since being on this medication  She sees ENT next Thursday  She is also with ~6# weight gain since July and is with ~ 2+ LE pretibial and pedal edema on exam which pt and her son, who is with her, tell me is increased from her baseline  She is with increased fatigue and increased BALTAZAR of late  She has h/o chronic diastolic HF and CAD  She is seen by [facility] - I see a note from 4/2021 in our EMR, she was reportedly seen last April as well  At this point we will double her lasix to 40 bid X 5 days then go back to 40 mg/day  In regards to her thrombocytopenia, we will see if we can have hematology weigh in given her risks and now, low Plt  I am concerned clinically given the Xarelto and now her epistaxis as well but this is complicated given her h/o clotting  I did discuss with she and her son that this can be very difficult, clinically  They understand this  They were comfortable continuing for now -- she is not bleeding elsewhere  Per ED notes, nothing noted to cauterize yesterday  ENT will also be helpful in this regard  STRICT ED precautions given  She is to STOP her nasal steroid  Can used humidified air at home, would not do nasal sprays at this point for fear it will restart the bleeding  She will repeat her labs next Thursday after her bid lasix -- renal function is stable -- CKD 3  We will check LFTs as well  I did check u/a today as she notes slightly dec urine output and chills  Of note, there is h/o pAfib noted, scattered throughout the chart, but I see no notes reflecting that nor does the patient have recall of that  They are on board with this plan  RTC as scheduled or as needed for acute worsening  Patient/Caretaker verbalized understanding and were in agreement with today's assessment and plan  Time was taken to address any questions patient/caretaker had        Indication/Risks/Benefits of medication(s) as prescribed were discussed with the patient/caretaker  The patient verbalized understanding and agreement and elects to take medications as prescribed  Time was taken to answer any questions the patient/caretaker may have had  Total time I spent caring for this patient on the day of the encounter - 40 minutes  0 minutes spent before the visit  35 minutes spent during the visit  5 minutes spent after the visit       Chief Complaint   Patient presents with    Nose Bleed     Nose bleed have been going on for an month had to hospital visit because bleeding was bad    Leg Swelling     Both legs are swelling bad    Insomnia     Have not been able to sleep it's been going on for about an month now    Sinusitis     Have been going on for about an month  Subjective      Patient is here with her son who lives outside of [place], today  She is with acutely worsening LE edema over the last couple of weeks  She is on daily 40 mg/day of Lasix  Does not skip doses  Is seen at [facility]  She has h/o chronic diastolic HF, CAD, heart block and has pacemaker  She is on chronic anticoagulation, Xarelto, 20 mg/day, given h/o chronic LE DVT and apparently, PE dating back to note from 2014  She was found to have lupus anticoagulant  Since being on anticoag, she has not had issue with clots  She has not had bleeding  She is now, also with nose bleeds, intermittently, since July X2 taking her to the ED, most recently yesterday  Her labs were notable for Plt of 74  This is new and not noted in ED  She denies other bleeding  By the time she got to ED, the bleeding subsided  There has been none, since  She does have some BALTAZAR of late, dec uop and chills  Her Cr is at baseline  Her h/h was stable  She feels more tired of late and has also been experiencing "intense sinus pain "  This is not her baseline for her sinuses  She was started on Singulair  She is on po antihistamine as well  Was on flonase as well    PCP Rx Amoxil at the end of August for presumed infection, did not help her  ENT referral done and again, appt on Thursday  No blurred/double vision  No other focal neurological deficits  It is not the worst headache of her life  She is pointing to her maxillary sinuses with pain and pressure  Review of Systems   All other systems reviewed and are negative        Current Outpatient Medications on File Prior to Visit   Medication Sig    acetaminophen (TYLENOL) 500 mg tablet Take 1,000 mg by mouth daily as needed    ALPRAZolam (XANAX) 0 25 mg tablet TAKE 1 TABLET BY MOUTH EVERY 12 HOURS AS NEEDED FOR ANXIETY    calcium-vitamin D 250-100 MG-UNIT per tablet Take 1 tablet by mouth 2 (two) times a day    escitalopram (LEXAPRO) 5 mg tablet take 1 tablet by mouth once daily    esomeprazole (NexIUM) 40 MG capsule TAKE 1 CAPSULE (40 MG TOTAL) BY MOUTH DAILY    Melatonin 10 MG TABS Take 3 mg by mouth      mirtazapine (REMERON) 15 mg tablet TAKE 1 TABLET (15 MG TOTAL) BY MOUTH DAILY AT BEDTIME    montelukast (SINGULAIR) 10 mg tablet Take 1 tablet (10 mg total) by mouth daily at bedtime    multivitamin (THERAGRAN) TABS Take 1 tablet by mouth daily    olmesartan (BENICAR) 5 mg tablet Take 5 mg by mouth daily    potassium chloride (K-DUR,KLOR-CON) 20 mEq tablet take 1 tablet by mouth once daily    Xarelto 20 MG tablet TAKE 1 TABLET (20 MG TOTAL) BY MOUTH DAILY    [DISCONTINUED] furosemide (LASIX) 40 mg tablet TAKE 1 TABLET (40 MG TOTAL) BY MOUTH DAILY    senna-docusate sodium (SENOKOT-S) 8 6-50 mg per tablet Take 1 tablet by mouth daily as needed for constipation    [DISCONTINUED] Benzocaine-Menthol 15-10 MG LOZG Apply 1 lozenge to the mouth or throat every 2 (two) hours as needed (sore throat) (Patient not taking: Reported on 9/23/2022)    [DISCONTINUED] Diclofenac Sodium (VOLTAREN) 1 % Apply 1 application topically 4 (four) times a day (Patient not taking: Reported on 9/23/2022)    [DISCONTINUED] fluticasone (FLONASE) 50 mcg/act nasal spray USE 2 SPRAYS EACH NOSTRIL EVERY DAY (Patient not taking: Reported on 9/23/2022)    [DISCONTINUED] fluticasone (FLONASE) 50 mcg/act nasal spray 2 sprays into each nostril daily (Patient not taking: Reported on 9/23/2022)    [DISCONTINUED] lidocaine (LIDODERM) 5 % Apply 1 patch topically daily Remove & Discard patch within 12 hours or as directed by MD (Patient not taking: Reported on 9/23/2022)    [DISCONTINUED] mometasone (ELOCON) 0 1 % cream  (Patient not taking: Reported on 9/23/2022)    [DISCONTINUED] montelukast (SINGULAIR) 10 mg tablet TAKE 1 TABLET (10 MG TOTAL) BY MOUTH DAILY (Patient not taking: Reported on 9/23/2022)    [DISCONTINUED] triamcinolone (KENALOG) 0 1 % ointment APPLY TWICE A DAY TO AFFECTED AREA X1-2 WEEKS (Patient not taking: Reported on 9/23/2022)       Objective     /72 (BP Location: Left arm, Patient Position: Sitting, Cuff Size: Large)   Pulse 60   Temp 97 9 °F (36 6 °C) (Temporal)   Resp 16   Ht 5' 1" (1 549 m)   Wt 65 8 kg (145 lb)   SpO2 99%   BMI 27 40 kg/m²     Physical Exam  Vitals and nursing note reviewed  Constitutional:       Comments: Tired appearing but in NAD   She is hard of hearing    HENT:      Head: Normocephalic and atraumatic  Ears:      Comments: Wearing hearing aid in R ear  L ear is clear      Nose:      Comments: Blood tinged mucus in the ant nose with scab formation  No active bleeding  I did not probe deep in the nose today, all of this was anterior  L nostril, ok  Mouth/Throat:      Mouth: Mucous membranes are moist       Pharynx: Oropharynx is clear  Cardiovascular:      Rate and Rhythm: Normal rate and regular rhythm  Comments: 2+ b/; pretibial and pedal edema   Pulmonary:      Effort: Pulmonary effort is normal       Breath sounds: Normal breath sounds  Comments: I hear no rales on exam   Musculoskeletal:      Cervical back: Neck supple        Comments: Kyphosis accentuated in T-spine  Slow, guarded gait    Lymphadenopathy:      Cervical: No cervical adenopathy  Skin:     General: Skin is warm and dry  Neurological:      General: No focal deficit present  Mental Status: She is alert and oriented to person, place, and time  Psychiatric:         Mood and Affect: Mood normal          Behavior: Behavior normal          Thought Content:  Thought content normal          Judgment: Judgment normal        Noemí Ramirez, DO

## 2022-09-24 LAB
BACTERIA UR QL AUTO: ABNORMAL /HPF
BILIRUB UR QL STRIP: NEGATIVE
CLARITY UR: CLEAR
COLOR UR: ABNORMAL
GLUCOSE UR STRIP-MCNC: NEGATIVE MG/DL
HGB UR QL STRIP.AUTO: NEGATIVE
HYALINE CASTS #/AREA URNS LPF: ABNORMAL /LPF
KETONES UR STRIP-MCNC: NEGATIVE MG/DL
LEUKOCYTE ESTERASE UR QL STRIP: ABNORMAL
MUCOUS THREADS UR QL AUTO: ABNORMAL
NITRITE UR QL STRIP: NEGATIVE
NON-SQ EPI CELLS URNS QL MICRO: ABNORMAL /HPF
PH UR STRIP.AUTO: 5 [PH]
PROT UR STRIP-MCNC: NEGATIVE MG/DL
RBC #/AREA URNS AUTO: ABNORMAL /HPF
SP GR UR STRIP.AUTO: 1.01 (ref 1–1.03)
UROBILINOGEN UR STRIP-ACNC: <2 MG/DL
WBC #/AREA URNS AUTO: ABNORMAL /HPF

## 2022-09-26 ENCOUNTER — TELEPHONE (OUTPATIENT)
Dept: HEMATOLOGY ONCOLOGY | Facility: CLINIC | Age: 87
End: 2022-09-26

## 2022-09-26 NOTE — TELEPHONE ENCOUNTER
Made attempt to schedule a new patient appointment, patient stating she will call back at a later time

## 2022-09-27 ENCOUNTER — TELEPHONE (OUTPATIENT)
Dept: HEMATOLOGY ONCOLOGY | Facility: CLINIC | Age: 87
End: 2022-09-27

## 2022-09-27 NOTE — TELEPHONE ENCOUNTER
Made attempt to schedule consult  Spoke with Jonny Aldana who explains that she is not feeling well today and would like to have her son call to arrange her consult for her  Champ Schwab [facility] phone number and she confirmed she will have her son call to schedule her visit

## 2022-09-29 ENCOUNTER — TELEPHONE (OUTPATIENT)
Dept: FAMILY MEDICINE CLINIC | Facility: CLINIC | Age: 87
End: 2022-09-29

## 2022-09-29 DIAGNOSIS — J32.9 SINUSITIS, UNSPECIFIED CHRONICITY, UNSPECIFIED LOCATION: Primary | ICD-10-CM

## 2022-09-29 RX ORDER — AMOXICILLIN AND CLAVULANATE POTASSIUM 875; 125 MG/1; MG/1
1 TABLET, FILM COATED ORAL EVERY 12 HOURS SCHEDULED
Qty: 14 TABLET | Refills: 0 | Status: SHIPPED | OUTPATIENT
Start: 2022-09-29 | End: 2022-10-06

## 2022-09-29 NOTE — TELEPHONE ENCOUNTER
Pt's son called asking if you can call in an antibiotic as she is having severe sinus headache   States she was in to see Dr Brandi Foreman on 7/22 but didn't finish the antibiotic because she didn't think she had to

## 2022-10-03 LAB — HCV AB SER-ACNC: NEGATIVE

## 2022-10-11 ENCOUNTER — OFFICE VISIT (OUTPATIENT)
Dept: FAMILY MEDICINE CLINIC | Facility: CLINIC | Age: 87
End: 2022-10-11
Payer: MEDICARE

## 2022-10-11 VITALS
TEMPERATURE: 97.7 F | RESPIRATION RATE: 18 BRPM | HEART RATE: 68 BPM | BODY MASS INDEX: 28.72 KG/M2 | DIASTOLIC BLOOD PRESSURE: 78 MMHG | SYSTOLIC BLOOD PRESSURE: 108 MMHG | WEIGHT: 152 LBS | OXYGEN SATURATION: 95 %

## 2022-10-11 DIAGNOSIS — I50.32 CHRONIC DIASTOLIC HEART FAILURE (HCC): Primary | ICD-10-CM

## 2022-10-11 DIAGNOSIS — R60.9 PERIPHERAL EDEMA: ICD-10-CM

## 2022-10-11 DIAGNOSIS — N18.32 STAGE 3B CHRONIC KIDNEY DISEASE (HCC): ICD-10-CM

## 2022-10-11 DIAGNOSIS — J34.89 SINUS PRESSURE: ICD-10-CM

## 2022-10-11 DIAGNOSIS — J31.0 CHRONIC RHINITIS: ICD-10-CM

## 2022-10-11 PROCEDURE — 99215 OFFICE O/P EST HI 40 MIN: CPT | Performed by: FAMILY MEDICINE

## 2022-10-11 RX ORDER — CETIRIZINE HYDROCHLORIDE 10 MG/1
10 TABLET ORAL DAILY
Qty: 90 TABLET | Refills: 0
Start: 2022-10-11

## 2022-10-11 RX ORDER — BUMETANIDE 2 MG/1
TABLET ORAL
Qty: 30 TABLET | Refills: 1 | Status: SHIPPED | OUTPATIENT
Start: 2022-10-11

## 2022-10-11 RX ORDER — FEXOFENADINE HCL 180 MG/1
180 TABLET ORAL DAILY
Qty: 90 TABLET | Refills: 0
Start: 2022-10-11

## 2022-10-11 NOTE — PROGRESS NOTES
Name: Valarie Alejandre      : 1928      MRN: [id number]  Encounter Provider: Rafi Aldridge DO  Encounter Date: 10/11/2022   Encounter department: [address] [place] [address]  Chronic diastolic heart failure ([address] ) - pt up 7# today  - is not responding to lasix  Will switch to bumex -- on 80 mg/day of lasix  Will switch to 2 mg bumex in AM and 1 mg in PM (only if needed, if not producing urine, if edema is persistent) - watch for 3# weight gain overnight or 5# in one week  Her son is here and on board  She has CKD and we must monitor this  Will get BMP prior to f/u with me in 2 weeks  Will also do compression stockings  She is with some sob, I hear no rales on exam and her O2 sat is stable  She does see [facility] and has a call into them as well  No cp  Low Sodium Diet, 1 5 liter fluid restriction for now  ED for acute changes  - bumetanide (BUMEX) 2 mg tablet; Take 2 mg in AM and 1 mg in the afternoon  Dispense: 30 tablet; Refill: 1  - Durable Medical Equipment  - Basic metabolic panel; Future    2  Peripheral edema  - see above  - bumetanide (BUMEX) 2 mg tablet; Take 2 mg in AM and 1 mg in the afternoon  Dispense: 30 tablet; Refill: 1  - Durable Medical Equipment  - Basic metabolic panel; Future    3  Chronic rhinitis  - will have her take zyrtec in AM and Allegra in the evening  Avoid intra-nasal meds, this caused nose bleeds  Was to see ENT, this appt got cancelled as she did not fell well that day  Her son feels there may be mold/exposure in her home and is taking her to be with him outside of [place] over the next week  No abx is warranted  - cetirizine (ZyrTEC) 10 mg tablet; Take 1 tablet (10 mg total) by mouth daily OTC  Dispense: 90 tablet; Refill: 0  - fexofenadine (ALLEGRA) 180 MG tablet; Take 1 tablet (180 mg total) by mouth daily OTC  Dispense: 90 tablet; Refill: 0    4   Sinus pressure  - cetirizine (ZyrTEC) 10 mg tablet; Take 1 tablet (10 mg total) by mouth daily OTC  Dispense: 90 tablet; Refill: 0  - fexofenadine (ALLEGRA) 180 MG tablet; Take 1 tablet (180 mg total) by mouth daily OTC  Dispense: 90 tablet; Refill: 0    5  Stage 3b chronic kidney disease ([facility] )  - need to closely monitor this on diuretics  Will need to balance edema versus renal status  D/w her son today  RTC in 2w, BMP prior or sooner ED  For acute changes --> ED     Patient/Caretaker verbalized understanding and were in agreement with today's assessment and plan  Time was taken to address any questions patient/caretaker had  Indication/Risks/Benefits of medication(s) as prescribed were discussed with the patient/caretaker  The patient verbalized understanding and agreement and elects to take medications as prescribed  Time was taken to answer any questions the patient/caretaker may have had  Total time I spent caring for this patient on the day of the encounter - 40 minutes  5minutes spent before the visit  30 minutes spent during the visit  5 minutes spent after the visit       Chief Complaint   Patient presents with   • Leg Swelling     Bilateral   • Sinus Problem       Subjective      Patient is here with her son who lives outside of [place], today  He was here at her last visit as well  She is with acutely worsening LE edema over the last couple of weeks - though they tell me it is not much better fro mher prior visit with me in September when we did bid lasix  Is taking this  Not producing much urine, edema is as bad/worse  Does have some SOB, not profound  Is seen at [facility]  She has h/o chronic diastolic HF, CAD, heart block and has pacemaker  She is on chronic anticoagulation, Xarelto, 20 mg/day, given h/o chronic LE DVT and apparently, PE dating back to note from 2014  She was found to have lupus anticoagulant  Since being on anticoag, she has not had issue with clots    She has had nose bleeds prompting her to the ED -- hem/onc referral placed at last visit as she had low Plt  Her labs were notable for Plt of 74  She denies other bleeding  She tells me Plt were normal on recheck at hem/onc  No issues with bleeding, off flonase  She feels more tired of late and has also been experiencing "intense sinus pain "  This is not her baseline for her sinuses  She was started on Singulair  She is on po antihistamine as well but son asking if they can adjust, this  flonase stopped! PCP Rx Amoxil at the end of August for presumed infection, did not help her  ENT referral done and again - did not get to this as she was ill  They have not rescheduled this  No blurred/double vision  No other focal neurological deficits  It is not the worst headache of her life  She is pointing to her maxillary sinuses with pain and pressure  Review of Systems   All other systems reviewed and are negative        Current Outpatient Medications on File Prior to Visit   Medication Sig   • acetaminophen (TYLENOL) 500 mg tablet Take 1,000 mg by mouth daily as needed   • ALPRAZolam (XANAX) 0 25 mg tablet TAKE 1 TABLET BY MOUTH EVERY 12 HOURS AS NEEDED FOR ANXIETY   • calcium-vitamin D 250-100 MG-UNIT per tablet Take 1 tablet by mouth 2 (two) times a day   • escitalopram (LEXAPRO) 5 mg tablet take 1 tablet by mouth once daily   • esomeprazole (NexIUM) 40 MG capsule TAKE 1 CAPSULE (40 MG TOTAL) BY MOUTH DAILY   • Melatonin 10 MG TABS Take 3 mg by mouth     • mirtazapine (REMERON) 15 mg tablet TAKE 1 TABLET (15 MG TOTAL) BY MOUTH DAILY AT BEDTIME   • montelukast (SINGULAIR) 10 mg tablet Take 1 tablet (10 mg total) by mouth daily at bedtime   • multivitamin (THERAGRAN) TABS Take 1 tablet by mouth daily   • potassium chloride (K-DUR,KLOR-CON) 20 mEq tablet take 1 tablet by mouth once daily   • Xarelto 20 MG tablet TAKE 1 TABLET (20 MG TOTAL) BY MOUTH DAILY   • [DISCONTINUED] furosemide (LASIX) 40 mg tablet Take 1 tablet (40 mg total) by mouth 2 (two) times a day for 5 days, THEN 1 tablet (40 mg total) daily  • [DISCONTINUED] olmesartan (BENICAR) 5 mg tablet Take 5 mg by mouth daily (Patient not taking: Reported on 10/11/2022)   • [DISCONTINUED] senna-docusate sodium (SENOKOT-S) 8 6-50 mg per tablet Take 1 tablet by mouth daily as needed for constipation (Patient not taking: Reported on 10/11/2022)       Objective     /78 (BP Location: Right arm, Patient Position: Sitting, Cuff Size: Standard)   Pulse 68   Temp 97 7 °F (36 5 °C) (Temporal)   Resp 18   Wt 68 9 kg (152 lb)   SpO2 95%   BMI 28 72 kg/m²     Physical Exam  Vitals and nursing note reviewed  Constitutional:       Comments: Tired appearing but in NAD   She is hard of hearing    HENT:      Head: Normocephalic and atraumatic  Right Ear: Tympanic membrane and ear canal normal       Left Ear: Tympanic membrane and ear canal normal       Ears:      Comments: Wearing hearing aid in R ear  L ear is clear      Nose:      Comments: Deviated septum > R  There is boggy, erythematous turbinates b/l > L today  Dry        Mouth/Throat:      Mouth: Mucous membranes are moist       Pharynx: Oropharynx is clear  Comments:   Mucus streaking at the posterior oropharynx with mild erythema   Eyes:      Conjunctiva/sclera: Conjunctivae normal       Pupils: Pupils are equal, round, and reactive to light  Cardiovascular:      Rate and Rhythm: Normal rate and regular rhythm  Comments: 2+ b/; pretibial and pedal edema noted today -- this is up to her knees bilaterally   Pulmonary:      Effort: Pulmonary effort is normal       Breath sounds: Normal breath sounds  No wheezing, rhonchi or rales  Comments: I hear no rales on exam   Musculoskeletal:      Cervical back: Neck supple  Comments: Kyphosis accentuated in T-spine  Slow, guarded gait    Lymphadenopathy:      Cervical: No cervical adenopathy  Skin:     General: Skin is warm and dry     Neurological:      General: No focal deficit present  Mental Status: She is alert and oriented to person, place, and time  Psychiatric:         Mood and Affect: Mood normal          Behavior: Behavior normal          Thought Content:  Thought content normal          Judgment: Judgment normal        Noemí Ramirez, DO

## 2022-10-18 DIAGNOSIS — E87.6 HYPOKALEMIA: Primary | ICD-10-CM

## 2022-10-18 DIAGNOSIS — R60.0 LOCALIZED EDEMA: ICD-10-CM

## 2022-10-18 RX ORDER — POTASSIUM CHLORIDE 20 MEQ/1
20 TABLET, EXTENDED RELEASE ORAL 2 TIMES DAILY
Qty: 60 TABLET | Refills: 0 | Status: SHIPPED | OUTPATIENT
Start: 2022-10-18

## 2022-10-26 ENCOUNTER — RA CDI HCC (OUTPATIENT)
Dept: OTHER | Facility: HOSPITAL | Age: 87
End: 2022-10-26

## 2022-10-26 NOTE — PROGRESS NOTES
[facility]  coding opportunities          Chart Reviewed number of suggestions sent to Provider: 1     Patients Insurance     Medicare Insurance: Medicare        [id number]

## 2022-11-08 ENCOUNTER — TELEPHONE (OUTPATIENT)
Dept: HEMATOLOGY ONCOLOGY | Facility: CLINIC | Age: 87
End: 2022-11-08

## 2022-11-08 NOTE — TELEPHONE ENCOUNTER
Spoke with patient, she stated she would not be able to make her appointment with Concepcion Gupta that was scheduled for 11/9/22 due to her son having [place]  I left message for son Edgardo Pitts as well to confirm whether patient would like to still come to our office due to her already being followed by Dr Bobbi Santizo who is a private practice hematologist  I left our office number for son to call back to confirm

## 2022-11-18 DIAGNOSIS — I50.32 CHRONIC DIASTOLIC HEART FAILURE (HCC): ICD-10-CM

## 2022-11-18 DIAGNOSIS — R60.9 PERIPHERAL EDEMA: ICD-10-CM

## 2022-11-18 RX ORDER — BUMETANIDE 2 MG/1
TABLET ORAL
Qty: 30 TABLET | Refills: 1 | Status: SHIPPED | OUTPATIENT
Start: 2022-11-18

## 2022-11-18 NOTE — TELEPHONE ENCOUNTER
This patient was hospitalized  Please check with her what diuretic she is on       TY    P[place], DO

## 2022-12-02 ENCOUNTER — RA CDI HCC (OUTPATIENT)
Dept: OTHER | Facility: HOSPITAL | Age: 87
End: 2022-12-02

## 2022-12-02 NOTE — PROGRESS NOTES
[facility]  coding opportunities          Chart Reviewed number of suggestions sent to Provider: 1  [id number]     Patients Insurance     Medicare Insurance: [employer]

## 2022-12-06 ENCOUNTER — TELEPHONE (OUTPATIENT)
Dept: HEMATOLOGY ONCOLOGY | Facility: CLINIC | Age: 87
End: 2022-12-06

## 2022-12-09 DIAGNOSIS — I50.32 CHRONIC DIASTOLIC HEART FAILURE (HCC): ICD-10-CM

## 2022-12-09 DIAGNOSIS — F41.9 ANXIETY: ICD-10-CM

## 2022-12-09 DIAGNOSIS — R60.9 PERIPHERAL EDEMA: ICD-10-CM

## 2022-12-09 RX ORDER — BUMETANIDE 2 MG/1
TABLET ORAL
Qty: 45 TABLET | Refills: 1 | Status: SHIPPED | OUTPATIENT
Start: 2022-12-09

## 2022-12-09 RX ORDER — ESCITALOPRAM OXALATE 5 MG/1
TABLET ORAL
Qty: 90 TABLET | Refills: 1 | Status: SHIPPED | OUTPATIENT
Start: 2022-12-09

## 2023-01-10 DIAGNOSIS — F32.89 OTHER DEPRESSION: ICD-10-CM

## 2023-01-10 RX ORDER — MIRTAZAPINE 15 MG/1
15 TABLET, FILM COATED ORAL
Qty: 90 TABLET | Refills: 3 | Status: SHIPPED | OUTPATIENT
Start: 2023-01-10

## 2023-02-09 DIAGNOSIS — R60.9 PERIPHERAL EDEMA: ICD-10-CM

## 2023-02-09 DIAGNOSIS — R60.0 LOCALIZED EDEMA: ICD-10-CM

## 2023-02-09 DIAGNOSIS — I50.32 CHRONIC DIASTOLIC HEART FAILURE (HCC): ICD-10-CM

## 2023-02-09 RX ORDER — BUMETANIDE 2 MG/1
TABLET ORAL
Qty: 45 TABLET | Refills: 1 | Status: CANCELLED | OUTPATIENT
Start: 2023-02-09

## 2023-02-09 NOTE — TELEPHONE ENCOUNTER
This patient should not be taking both lasix and bumex  Please clarify with her        Christian Hay, DO

## 2023-02-10 RX ORDER — FUROSEMIDE 40 MG/1
40 TABLET ORAL DAILY
Qty: 90 TABLET | Refills: 0 | Status: SHIPPED | OUTPATIENT
Start: 2023-02-10

## 2023-05-05 DIAGNOSIS — R60.0 LOCALIZED EDEMA: ICD-10-CM

## 2023-05-05 RX ORDER — FUROSEMIDE 40 MG/1
40 TABLET ORAL DAILY
Qty: 90 TABLET | Refills: 0 | Status: SHIPPED | OUTPATIENT
Start: 2023-05-05

## 2023-05-08 DIAGNOSIS — K21.9 GASTROESOPHAGEAL REFLUX DISEASE: ICD-10-CM

## 2023-05-08 DIAGNOSIS — F32.89 OTHER DEPRESSION: ICD-10-CM

## 2023-05-08 DIAGNOSIS — J45.909 ASTHMA DUE TO ENVIRONMENTAL ALLERGIES: ICD-10-CM

## 2023-05-08 RX ORDER — RIVAROXABAN 20 MG/1
20 TABLET, FILM COATED ORAL DAILY
Qty: 30 TABLET | Refills: 11 | Status: SHIPPED | OUTPATIENT
Start: 2023-05-08

## 2023-05-16 ENCOUNTER — TELEPHONE (OUTPATIENT)
Dept: FAMILY MEDICINE CLINIC | Facility: CLINIC | Age: 88
End: 2023-05-16

## 2023-05-16 NOTE — TELEPHONE ENCOUNTER
I recommend her be seen  She is too high risk to blindly Rx something  If not local, she should go to [facility], DO

## 2023-05-16 NOTE — TELEPHONE ENCOUNTER
Patient called stating that she is in need of antibiotics  Patient has Sinus and Chest Congestion for the last 4 days   Patient is staying with her son [phone]

## 2023-05-18 ENCOUNTER — OFFICE VISIT (OUTPATIENT)
Dept: FAMILY MEDICINE CLINIC | Facility: CLINIC | Age: 88
End: 2023-05-18

## 2023-05-18 VITALS
OXYGEN SATURATION: 95 % | HEART RATE: 61 BPM | BODY MASS INDEX: 23.17 KG/M2 | TEMPERATURE: 98 F | SYSTOLIC BLOOD PRESSURE: 126 MMHG | RESPIRATION RATE: 18 BRPM | DIASTOLIC BLOOD PRESSURE: 59 MMHG | WEIGHT: 122.6 LBS

## 2023-05-18 DIAGNOSIS — J44.9 CHRONIC OBSTRUCTIVE PULMONARY DISEASE, UNSPECIFIED COPD TYPE (HCC): ICD-10-CM

## 2023-05-18 DIAGNOSIS — J40 BRONCHITIS: ICD-10-CM

## 2023-05-18 DIAGNOSIS — D69.6 THROMBOCYTOPENIA (HCC): ICD-10-CM

## 2023-05-18 DIAGNOSIS — K50.919 CROHN'S DISEASE WITH COMPLICATION, UNSPECIFIED GASTROINTESTINAL TRACT LOCATION (HCC): ICD-10-CM

## 2023-05-18 DIAGNOSIS — I74.9 THROMBOEMBOLIC DISORDER (HCC): ICD-10-CM

## 2023-05-18 DIAGNOSIS — J01.00 ACUTE NON-RECURRENT MAXILLARY SINUSITIS: Primary | ICD-10-CM

## 2023-05-18 DIAGNOSIS — S06.5X9A TRAUMATIC SUBDURAL HEMORRHAGE WITH LOSS OF CONSCIOUSNESS OF UNSPECIFIED DURATION, INITIAL ENCOUNTER (HCC): ICD-10-CM

## 2023-05-18 DIAGNOSIS — I50.32 CHRONIC DIASTOLIC HEART FAILURE (HCC): ICD-10-CM

## 2023-05-18 DIAGNOSIS — N18.32 STAGE 3B CHRONIC KIDNEY DISEASE (HCC): ICD-10-CM

## 2023-05-18 RX ORDER — DOXYCYCLINE HYCLATE 100 MG/1
100 CAPSULE ORAL EVERY 12 HOURS SCHEDULED
Qty: 20 CAPSULE | Refills: 0 | Status: SHIPPED | OUTPATIENT
Start: 2023-05-18 | End: 2023-05-28

## 2023-05-18 RX ORDER — ALBUTEROL SULFATE 90 UG/1
2 AEROSOL, METERED RESPIRATORY (INHALATION) EVERY 6 HOURS PRN
Qty: 18 G | Refills: 0 | Status: SHIPPED | OUTPATIENT
Start: 2023-05-18

## 2023-05-18 NOTE — PROGRESS NOTES
Name: Lata Martinez      : 1928      MRN: [id number]  Encounter Provider: Shayy Berkowitz DO  Encounter Date: 2023   Encounter department: [address] [place] [address]     1  Acute non-recurrent maxillary sinusitis  doxycycline hyclate (VIBRAMYCIN) 100 mg capsule      2  Bronchitis  doxycycline hyclate (VIBRAMYCIN) 100 mg capsule    albuterol (Ventolin HFA) 90 mcg/act inhaler      3  Thromboembolic disorder ([facility] )        4  Chronic obstructive pulmonary disease, unspecified COPD type ([facility] )        5  Crohn's disease with complication, unspecified gastrointestinal tract location ([facility] )        6  Chronic diastolic heart failure ([facility] )        7  Thrombocytopenia ([place] [address] )        8  Traumatic subdural hemorrhage with loss of consciousness of unspecified duration, initial encounter ([facility] )        9  Stage 3b chronic kidney disease ([place] [address] )          We will tx with doxy  Inhaler provided for prn use -- wheezing/sob  O2 sat stable here today  Can use nasal saline for congestion, mucinex OK too  Stay hydrated/plenty of rest   We will see her back as scheduled, sooner prn    Patient/Caretaker verbalized understanding and were in agreement with today's assessment and plan  Time was taken to address any questions patient/caretaker had  Indication/Risks/Benefits of medication(s) as prescribed were discussed with the patient/caretaker  The patient verbalized understanding and agreement and elects to take medications as prescribed  Time was taken to answer any questions the patient/caretaker may have had  Chief Complaint   Patient presents with   • Cough   • Nasal Congestion       Subjective      Pt here with several week h/o sinus nasal congestion, headache, pnd   No f/c/s  She is with cough, sputum production  There is some wheezing on occasion, no lazarus sob/cerna  Her son helps care for her  He lives outside of [place]    She has not had a whole lot of energy of late as well 2/2 this  She has been taking OTC meds but despite, is still struggling  Since last visit was hospitalized for CHF and is subsequently down significant weight  Has been doing well in this regard  Is on board with [facility] Cardiology  Review of Systems   All other systems reviewed and are negative  Current Outpatient Medications on File Prior to Visit   Medication Sig   • acetaminophen (TYLENOL) 500 mg tablet Take 1,000 mg by mouth daily as needed   • ALPRAZolam (XANAX) 0 25 mg tablet TAKE 1 TABLET BY MOUTH EVERY 12 HOURS AS NEEDED FOR ANXIETY   • calcium-vitamin D 250-100 MG-UNIT per tablet Take 1 tablet by mouth 2 (two) times a day   • cetirizine (ZyrTEC) 10 mg tablet Take 1 tablet (10 mg total) by mouth daily OTC   • escitalopram (LEXAPRO) 5 mg tablet take 1 tablet by mouth once daily   • esomeprazole (NexIUM) 40 MG capsule TAKE 1 CAPSULE (40 MG TOTAL) BY MOUTH DAILY   • fexofenadine (ALLEGRA) 180 MG tablet Take 1 tablet (180 mg total) by mouth daily OTC   • furosemide (LASIX) 40 mg tablet TAKE 1 TABLET (40 MG TOTAL) BY MOUTH DAILY   • Melatonin 10 MG TABS Take 3 mg by mouth     • mirtazapine (REMERON) 15 mg tablet TAKE 1 TABLET (15 MG TOTAL) BY MOUTH DAILY AT BEDTIME   • montelukast (SINGULAIR) 10 mg tablet Take 1 tablet (10 mg total) by mouth daily at bedtime   • multivitamin (THERAGRAN) TABS Take 1 tablet by mouth daily   • potassium chloride (K-DUR,KLOR-CON) 20 mEq tablet Take 1 tablet (20 mEq total) by mouth 2 (two) times a day   • Xarelto 20 MG tablet TAKE 1 TABLET (20 MG TOTAL) BY MOUTH DAILY       Objective     /59 (BP Location: Left arm, Patient Position: Sitting, Cuff Size: Standard)   Pulse 61   Temp 98 °F (36 7 °C) (Temporal)   Resp 18   Wt 55 6 kg (122 lb 9 6 oz)   SpO2 95%   BMI 23 17 kg/m²     Physical Exam  Vitals and nursing note reviewed  Constitutional:       General: She is not in acute distress  Appearance: Normal appearance     HENT:      Head: Normocephalic and atraumatic  Right Ear: Tympanic membrane and ear canal normal       Left Ear: Tympanic membrane and ear canal normal       Ears:      Comments: Fluid behind TMs       Nose:      Comments: Boggy, erythematous turbinates b/l        Mouth/Throat:      Comments: Mucus streaking at the posterior oropharynx with mild erythema     Eyes:      Conjunctiva/sclera: Conjunctivae normal    Cardiovascular:      Rate and Rhythm: Normal rate and regular rhythm  Heart sounds: Murmur heard  Pulmonary:      Effort: Pulmonary effort is normal       Breath sounds: Wheezing and rhonchi present  No rales  Abdominal:      General: Bowel sounds are normal       Palpations: Abdomen is soft  Musculoskeletal:      Cervical back: Neck supple  Comments: Slow/guarded gait  Uses a cane     Lymphadenopathy:      Cervical: No cervical adenopathy  Skin:     General: Skin is warm and dry  Neurological:      General: No focal deficit present  Mental Status: She is alert and oriented to person, place, and time  Psychiatric:         Mood and Affect: Mood normal          Behavior: Behavior normal          Thought Content:  Thought content normal          Judgment: Judgment normal        Noemí Foss DO

## 2023-06-03 DIAGNOSIS — F41.9 ANXIETY: ICD-10-CM

## 2023-06-05 RX ORDER — ESCITALOPRAM OXALATE 5 MG/1
TABLET ORAL
Qty: 90 TABLET | Refills: 1 | Status: SHIPPED | OUTPATIENT
Start: 2023-06-05 | End: 2023-06-08

## 2023-06-08 DIAGNOSIS — F41.9 ANXIETY: ICD-10-CM

## 2023-06-08 RX ORDER — ESCITALOPRAM OXALATE 5 MG/1
TABLET ORAL
Qty: 90 TABLET | Refills: 1 | Status: SHIPPED | OUTPATIENT
Start: 2023-06-08

## 2023-07-06 ENCOUNTER — RA CDI HCC (OUTPATIENT)
Dept: OTHER | Facility: HOSPITAL | Age: 88
End: 2023-07-06

## 2023-07-06 NOTE — PROGRESS NOTES
[facility] coding opportunities          Chart Reviewed number of suggestions sent to Provider: 1     Patients Insurance     Medicare Insurance: Medicare        I13.0

## 2023-07-08 DIAGNOSIS — K21.9 GASTROESOPHAGEAL REFLUX DISEASE: ICD-10-CM

## 2023-07-10 DIAGNOSIS — K21.9 GASTROESOPHAGEAL REFLUX DISEASE: ICD-10-CM

## 2023-07-10 RX ORDER — ESOMEPRAZOLE MAGNESIUM 40 MG/1
40 CAPSULE, DELAYED RELEASE ORAL DAILY
Qty: 30 CAPSULE | Refills: 5 | OUTPATIENT
Start: 2023-07-10

## 2023-07-10 RX ORDER — ESOMEPRAZOLE MAGNESIUM 40 MG/1
40 CAPSULE, DELAYED RELEASE ORAL DAILY
Qty: 30 CAPSULE | Refills: 5 | Status: SHIPPED | OUTPATIENT
Start: 2023-07-10

## 2023-08-01 DIAGNOSIS — R60.0 LOCALIZED EDEMA: ICD-10-CM

## 2023-08-01 RX ORDER — FUROSEMIDE 40 MG/1
40 TABLET ORAL DAILY
Qty: 90 TABLET | Refills: 0 | Status: SHIPPED | OUTPATIENT
Start: 2023-08-01

## 2023-09-12 ENCOUNTER — TELEPHONE (OUTPATIENT)
Dept: FAMILY MEDICINE CLINIC | Facility: CLINIC | Age: 88
End: 2023-09-12

## 2023-09-12 DIAGNOSIS — F32.9 REACTIVE DEPRESSION: Primary | ICD-10-CM

## 2023-09-12 NOTE — TELEPHONE ENCOUNTER
I did a behavioral health referral for her. We can see if Angie Crigler is taking patients. If so, would fax to her. She is on lexapro, is she still taking this? Is she taking her xanax prn? How about remeron?       Brown Layton, DO

## 2023-09-12 NOTE — TELEPHONE ENCOUNTER
Home health aid states patient is very depressed. States she just wants to die and shes sad/teary eyed. Asking for a therapist or some other form of help for her.

## 2023-09-14 ENCOUNTER — OFFICE VISIT (OUTPATIENT)
Dept: FAMILY MEDICINE CLINIC | Facility: CLINIC | Age: 88
End: 2023-09-14
Payer: MEDICARE

## 2023-09-14 VITALS
BODY MASS INDEX: 21.46 KG/M2 | HEART RATE: 80 BPM | OXYGEN SATURATION: 97 % | SYSTOLIC BLOOD PRESSURE: 118 MMHG | DIASTOLIC BLOOD PRESSURE: 62 MMHG | RESPIRATION RATE: 16 BRPM | WEIGHT: 113.6 LBS | TEMPERATURE: 98.4 F

## 2023-09-14 DIAGNOSIS — R60.0 LOCALIZED EDEMA: ICD-10-CM

## 2023-09-14 DIAGNOSIS — M25.562 CHRONIC PAIN OF BOTH KNEES: ICD-10-CM

## 2023-09-14 DIAGNOSIS — Z71.89 ADVANCE CARE PLANNING: ICD-10-CM

## 2023-09-14 DIAGNOSIS — F33.1 MODERATE EPISODE OF RECURRENT MAJOR DEPRESSIVE DISORDER (HCC): ICD-10-CM

## 2023-09-14 DIAGNOSIS — E87.6 HYPOKALEMIA: ICD-10-CM

## 2023-09-14 DIAGNOSIS — Z95.0 PACEMAKER: ICD-10-CM

## 2023-09-14 DIAGNOSIS — I10 BENIGN ESSENTIAL HYPERTENSION: ICD-10-CM

## 2023-09-14 DIAGNOSIS — F51.04 PSYCHOPHYSIOLOGICAL INSOMNIA: ICD-10-CM

## 2023-09-14 DIAGNOSIS — M25.561 CHRONIC PAIN OF BOTH KNEES: ICD-10-CM

## 2023-09-14 DIAGNOSIS — G89.29 CHRONIC PAIN OF BOTH KNEES: ICD-10-CM

## 2023-09-14 DIAGNOSIS — Z00.00 MEDICARE ANNUAL WELLNESS VISIT, SUBSEQUENT: Primary | ICD-10-CM

## 2023-09-14 DIAGNOSIS — M17.10 ARTHRITIS OF KNEE: ICD-10-CM

## 2023-09-14 PROBLEM — S42.292D CLOSED 4-PART FRACTURE OF PROXIMAL END OF LEFT HUMERUS WITH ROUTINE HEALING: Status: RESOLVED | Noted: 2017-10-27 | Resolved: 2023-09-14

## 2023-09-14 PROCEDURE — G0439 PPPS, SUBSEQ VISIT: HCPCS | Performed by: FAMILY MEDICINE

## 2023-09-14 PROCEDURE — 20610 DRAIN/INJ JOINT/BURSA W/O US: CPT | Performed by: FAMILY MEDICINE

## 2023-09-14 PROCEDURE — 99214 OFFICE O/P EST MOD 30 MIN: CPT | Performed by: FAMILY MEDICINE

## 2023-09-14 RX ORDER — TRIAMCINOLONE ACETONIDE 40 MG/ML
40 INJECTION, SUSPENSION INTRA-ARTICULAR; INTRAMUSCULAR
Status: COMPLETED | OUTPATIENT
Start: 2023-09-14 | End: 2023-09-14

## 2023-09-14 RX ORDER — POTASSIUM CHLORIDE 20 MEQ/1
20 TABLET, EXTENDED RELEASE ORAL DAILY
Qty: 60 TABLET | Refills: 0 | Status: SHIPPED
Start: 2023-09-14

## 2023-09-14 RX ORDER — CHOLECALCIFEROL (VITAMIN D3) 125 MCG
1 CAPSULE ORAL
Qty: 90 TABLET | Refills: 0
Start: 2023-09-14

## 2023-09-14 RX ORDER — FUROSEMIDE 40 MG/1
40 TABLET ORAL 2 TIMES DAILY
Qty: 90 TABLET | Refills: 0 | Status: SHIPPED
Start: 2023-09-14

## 2023-09-14 RX ORDER — LIDOCAINE HYDROCHLORIDE 20 MG/ML
5 INJECTION, SOLUTION EPIDURAL; INFILTRATION; INTRACAUDAL; PERINEURAL
Status: COMPLETED | OUTPATIENT
Start: 2023-09-14 | End: 2023-09-14

## 2023-09-14 RX ORDER — ESCITALOPRAM OXALATE 10 MG/1
10 TABLET ORAL DAILY
Qty: 90 TABLET | Refills: 1 | Status: SHIPPED | OUTPATIENT
Start: 2023-09-14 | End: 2024-03-12

## 2023-09-14 RX ADMIN — TRIAMCINOLONE ACETONIDE 40 MG: 40 INJECTION, SUSPENSION INTRA-ARTICULAR; INTRAMUSCULAR at 12:00

## 2023-09-14 RX ADMIN — LIDOCAINE HYDROCHLORIDE 5 ML: 20 INJECTION, SOLUTION EPIDURAL; INFILTRATION; INTRACAUDAL; PERINEURAL at 12:00

## 2023-09-14 NOTE — PROGRESS NOTES
Name: Rosi Erickson      : 1928      MRN: [id number]  Encounter Provider: Dominga Martines DO  Encounter Date: 2023   Encounter department: [address]     1. Chronic diastolic heart failure ([address])  - she is now on bid lasix. Weight stable. She does see Cardiology. No changes to make today. Stage 3b chronic kidney disease ([address])  - stable. Cont regimen. Localized edema  - stable. As per above. Medically managed. - furosemide (LASIX) 40 mg tablet; Take 1 tablet (40 mg total) by mouth 2 (two) times a day  Dispense: 90 tablet; Refill: 0    Moderate episode of recurrent major depressive disorder ([address])  - this is poorly controlled. She is in many ways given up the will to live. Anxious and depressed about her health. Talks a lot about death and dying. We will increase lexapro to 10. Her son is to watch closely for any acute changes and if she is experiencing, to back down. She is on remeron for sleep -- this is stable. Rarely takes xanax. - escitalopram (LEXAPRO) 10 mg tablet; Take 1 tablet (10 mg total) by mouth daily  Dispense: 90 tablet; Refill: 1    Hypokalemia  - stable on supplement. Updated med list from her hospital stay at [facility]. - potassium chloride (K-DUR,KLOR-CON) 20 mEq tablet; Take 1 tablet (20 mEq total) by mouth daily  Dispense: 60 tablet; Refill: 0    Psychophysiological insomnia  - remeron and melatonin. Cont. - Melatonin 5 MG TABS; Take 1 tablet (5 mg total) by mouth daily at bedtime  Dispense: 90 tablet; Refill: 0    Arthritis of knee  - b/l  XR on file. Injected R today. Will RTC for L knee injection. Has helped her in the past.  Tolerated well. Given post procedure instruction here today. Her son is on board. Pacemaker    Chronic pain of both knees  - see above. 10. Benign essential hypertension  Stable. RTC in a few weeks for L knee injection.   Did do referral for [facility] as well a few days ago. Ok to see Ashli Friends if still local.  Her son on board. If there is another provider, they can let me kow. Patient/Caretaker verbalized understanding and were in agreement with today's assessment and plan. Time was taken to address any questions patient/caretaker had. Indication/Risks/Benefits of medication(s) as prescribed were discussed with the patient/caretaker. The patient verbalized understanding and agreement and elects to take medications as prescribed. Time was taken to answer any questions the patient/caretaker may have had. Large joint arthrocentesis: R knee  Universal Protocol:  Consent: Verbal consent obtained. Risks and benefits: risks, benefits and alternatives were discussed  Consent given by: patient  Time out: Immediately prior to procedure a "time out" was called to verify the correct patient, procedure, equipment, support staff and site/side marked as required. Patient understanding: patient states understanding of the procedure being performed  Patient consent: the patient's understanding of the procedure matches consent given  Patient identity confirmed: verbally with patient    Supporting Documentation  Indications: pain and joint swelling   Procedure Details  Location: knee - R knee  Needle size: 22 G  Ultrasound guidance: no  Approach: anterolateral  Medications administered: 5 mL lidocaine (PF) 2 %; 40 mg triamcinolone acetonide 40 mg/mL    Patient tolerance: patient tolerated the procedure well with no immediate complications            Chief Complaint   Patient presents with   • Medicare Wellness Visit       Subjective      Here with her son for CDM and AMW visit. Acutely c/o b/l knee pain. She has had injections in the past that helped her and is wondering if she can have these again. XR on file. She has other pain generators but her knees are most bothersome.       She is with increased anxiety and depression given her decline in health. Was hospitalized at [facility] in [place] in August due to volvulus. She did got to SNF and is now with her son again. They are between [address] [place] [address] Street and his house outside of [place]. She is on low dose lexapro and has been for some time. They would like to know if this can be increased.  is concerned as she talks a lot about death and dying. Referral to [facility] also placed a few days ago. She feels sad about her health and "I am tired."      She has CHF -- sees Cardiology. No worsening sob/cerna. Stable. Swelling is stable. She is on meds and cont to take these. Allergies -- stable. HTN - stable. Not using inhaler. Insomnia - takes meds for nighttime. Review of Systems   All other systems reviewed and are negative.       Current Outpatient Medications on File Prior to Visit   Medication Sig   • acetaminophen (TYLENOL) 500 mg tablet Take 1,000 mg by mouth daily as needed   • albuterol (Ventolin HFA) 90 mcg/act inhaler Inhale 2 puffs every 6 (six) hours as needed for wheezing or shortness of breath   • ALPRAZolam (XANAX) 0.25 mg tablet TAKE 1 TABLET BY MOUTH EVERY 12 HOURS AS NEEDED FOR ANXIETY   • calcium-vitamin D 250-100 MG-UNIT per tablet Take 1 tablet by mouth 2 (two) times a day   • esomeprazole (NexIUM) 40 MG capsule Take 1 capsule (40 mg total) by mouth daily   • magnesium hydroxide (MILK OF MAGNESIA) 400 mg/5 mL oral suspension Take by mouth daily as needed for constipation   • mirtazapine (REMERON) 15 mg tablet TAKE 1 TABLET (15 MG TOTAL) BY MOUTH DAILY AT BEDTIME   • montelukast (SINGULAIR) 10 mg tablet Take 1 tablet (10 mg total) by mouth daily at bedtime   • multivitamin (THERAGRAN) TABS Take 1 tablet by mouth daily   • Xarelto 20 MG tablet TAKE 1 TABLET (20 MG TOTAL) BY MOUTH DAILY   • [DISCONTINUED] escitalopram (LEXAPRO) 5 mg tablet take 1 tablet by mouth once daily   • [DISCONTINUED] furosemide (LASIX) 40 mg tablet TAKE 1 TABLET (40 MG TOTAL) BY MOUTH DAILY   • [DISCONTINUED] Melatonin 10 MG TABS Take 3 mg by mouth     • [DISCONTINUED] potassium chloride (K-DUR,KLOR-CON) 20 mEq tablet Take 1 tablet (20 mEq total) by mouth 2 (two) times a day   • cetirizine (ZyrTEC) 10 mg tablet Take 1 tablet (10 mg total) by mouth daily OTC   • [DISCONTINUED] fexofenadine (ALLEGRA) 180 MG tablet Take 1 tablet (180 mg total) by mouth daily OTC (Patient not taking: Reported on 9/14/2023)       Objective     /62   Pulse 80   Temp 98.4 °F (36.9 °C) (Temporal)   Resp 16   Wt 51.5 kg (113 lb 9.6 oz)   SpO2 97%   BMI 21.46 kg/m²     Physical Exam  Vitals and nursing note reviewed. Constitutional:       Comments: Tired appearing but in NAD   She is hard of hearing    HENT:      Head: Normocephalic and atraumatic. Ears:      Comments: R hearing aid       Nose:      Comments: Deviated septum > R  There is boggy, erythematous turbinates b/l > L today  Dry        Mouth/Throat:      Mouth: Mucous membranes are moist.      Pharynx: Oropharynx is clear. Eyes:      Conjunctiva/sclera: Conjunctivae normal.      Pupils: Pupils are equal, round, and reactive to light. Cardiovascular:      Rate and Rhythm: Normal rate and regular rhythm. Comments: Trace b/l LE edema today    Pulmonary:      Effort: Pulmonary effort is normal.      Breath sounds: Normal breath sounds. No wheezing, rhonchi or rales. Musculoskeletal:      Cervical back: Neck supple. Comments: Kyphosis accentuated in T-spine  Slow, guarded gait   There is b/l knee pain with swelling and crepitus to palpation  She uses her son to help her get around    Lymphadenopathy:      Cervical: No cervical adenopathy. Skin:     General: Skin is warm and dry. Neurological:      General: No focal deficit present. Mental Status: She is alert and oriented to person, place, and time. Psychiatric:         Mood and Affect: Mood normal.         Behavior: Behavior normal.         Thought Content:  Thought content normal.         Judgment: Judgment normal.       Noemí Mcneil, DO

## 2023-09-14 NOTE — PROGRESS NOTES
Assessment and Plan:     AMW - discussed ACP today. They have DNR at home. Her son to bring in f/u. Preventive health issues were discussed with patient, and age appropriate screening tests were ordered as noted in patient's After Visit Summary. Personalized health advice and appropriate referrals for health education or preventive services given if needed, as noted in patient's After Visit Summary. Chief Complaint   Patient presents with   • Medicare Wellness Visit          History of Present Illness:     Patient presents for a Medicare Wellness Visit      Patient Care Team:  Velasquez May DO as PCP - General (Family Medicine)     Review of Systems:     Review of Systems   All other systems reviewed and are negative.        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 ([facility])   • Closed 4-part fracture of proximal end of left humerus with routine healing   • Closed fracture of maxillary sinus ([facility])   • Deep venous thrombosis of distal lower extremity ([facility])   • 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, unspecified laterality   • Depressed   • Diaphragmatic hernia without obstruction or gangrene   • Diastolic dysfunction   • Peripheral edema   • Enteritis   • Fatigue   • Fracture of femoral neck, left ([facility])   • Fracture of left humerus   • Gait abnormality   • Heart block   • Hemorrhage of anus and rectum   • Hip fracture ([facility])   • History of DVT (deep vein thrombosis)   • Hx pulmonary embolism   • Hyperlipidemia   • Essential hypertension   • Joint pain, hip   • Leg swelling   • Memory impairment   • Osteoarthritis   • Osteoarthritis of knee   • Osteoporosis   • Pacemaker   • PHN (postherpetic neuralgia)   • S/P hip hemiarthroplasty   • SAH (subarachnoid hemorrhage) ([facility])   • Sciatica   • SDH (subdural hematoma) ([facility])   • Seborrheic keratosis   • Status post reverse total replacement of left shoulder   • Subarachnoid hemorrhage following injury, with loss of consciousness ([facility])   • Trochanteric bursitis of left hip   • Vertigo   • Constipation   • Chronic pain of both knees   • Medicare annual wellness visit, subsequent   • Stage 3b chronic kidney disease ([address])   • BMI 26.0-26.9,adult   • Lower extremity edema   • Chronic diastolic heart failure ([facility])   • Thrombocytopenia ([facility])   • Epistaxis   • Lupus anticoagulant positive   • Thromboembolic disorder ([facility])   • Chronic obstructive pulmonary disease, unspecified COPD type ([address])   • Crohn's disease with complication, unspecified gastrointestinal tract location [facility])      Past Medical and Surgical History:     Past Medical History:   Diagnosis Date   • Abnormal electrocardiogram     Last Assessed: 21mar2013   • Arthritis    • Atrial fibrillation ([address])     Last Assessed: 29Sep2016   • Complete atrioventricular block ([address])     Last Assessed: [id number]   • Diverticulosis     Last Assessed: [id number]   • GERD (gastroesophageal reflux disease)     Last Assessed: 21Mar2013   • Glaucoma    • Hiatal hernia    • Hypertension     Resolved: 12Aug2014   • Intermittent claudication [facility])     Last Assessed: 23Jan2015   • Macular degeneration     Last Assessed: 21Mar2013   • Pericardial effusion    • Pulmonary embolism [facility])      Past Surgical History:   Procedure Laterality Date   • APPENDECTOMY     • HYSTERECTOMY     • TONSILLECTOMY AND ADENOIDECTOMY        Family History:     Family History   Problem Relation Age of Onset   • Hypertension Mother    • Diabetes Father    • Heart disease Father    • Hypertension Father    • Alzheimer's disease Sister    • Colon cancer Sister       Social History:     Social History     Socioeconomic History   • Marital status:       Spouse name: Not on file   • Number of children: Not on file   • Years of education: Not on file   • Highest education level: Not on file   Occupational History   • Not on file   Tobacco Use   • Smoking status: Never   • Smokeless tobacco: Never   Vaping Use   • Vaping Use: Never used   Substance and Sexual Activity   • Alcohol use: No   • Drug use: No   • Sexual activity: Not Currently   Other Topics Concern   • Not on file   Social History Narrative   • Not on file     Social Determinants of Health     Financial Resource Strain: Not on file   Food Insecurity: Not on file   Transportation Needs: Not on file   Physical Activity: Not on file   Stress: Not on file   Social Connections: Not on file   Intimate Partner Violence: Not on file   Housing Stability: Not on file      Medications and Allergies:     Current Outpatient Medications   Medication Sig Dispense Refill   • acetaminophen (TYLENOL) 500 mg tablet Take 1,000 mg by mouth daily as needed     • albuterol (Ventolin HFA) 90 mcg/act inhaler Inhale 2 puffs every 6 (six) hours as needed for wheezing or shortness of breath 18 g 0   • ALPRAZolam (XANAX) 0.25 mg tablet TAKE 1 TABLET BY MOUTH EVERY 12 HOURS AS NEEDED FOR ANXIETY 60 tablet 5   • calcium-vitamin D 250-100 MG-UNIT per tablet Take 1 tablet by mouth 2 (two) times a day     • cetirizine (ZyrTEC) 10 mg tablet Take 1 tablet (10 mg total) by mouth daily OTC 90 tablet 0   • escitalopram (LEXAPRO) 5 mg tablet take 1 tablet by mouth once daily 90 tablet 1   • esomeprazole (NexIUM) 40 MG capsule Take 1 capsule (40 mg total) by mouth daily 30 capsule 5   • fexofenadine (ALLEGRA) 180 MG tablet Take 1 tablet (180 mg total) by mouth daily OTC 90 tablet 0   • furosemide (LASIX) 40 mg tablet TAKE 1 TABLET (40 MG TOTAL) BY MOUTH DAILY 90 tablet 0   • Melatonin 10 MG TABS Take 3 mg by mouth       • mirtazapine (REMERON) 15 mg tablet TAKE 1 TABLET (15 MG TOTAL) BY MOUTH DAILY AT BEDTIME 90 tablet 3   • montelukast (SINGULAIR) 10 mg tablet Take 1 tablet (10 mg total) by mouth daily at bedtime 90 tablet 3   • multivitamin [facility]) TABS Take 1 tablet by mouth daily     • potassium chloride (K-DUR,KLOR-CON) 20 mEq tablet Take 1 tablet (20 mEq total) by mouth 2 (two) times a day 60 tablet 0   • Xarelto 20 MG tablet TAKE 1 TABLET (20 MG TOTAL) BY MOUTH DAILY 30 tablet 11     No current facility-administered medications for this visit. Allergies   Allergen Reactions   • Codeine Other (See Comments)     Like I was floating  Felt dizzy. Took codeine many years ago      Immunizations:     Immunization History   Administered Date(s) Administered   • COVID-19 MODERNA VACC 0.5 ML IM 01/26/2021, 03/01/2021, 12/09/2021, 07/05/2022   • INFLUENZA 10/01/2012, 09/26/2014, 09/29/2016, 10/17/2017   • Influenza Split High Dose Preservative Free IM 09/29/2016, 10/17/2017   • Influenza, high dose seasonal 0.7 mL 11/12/2018, 11/15/2019, 10/21/2021, 09/23/2022   • Influenza, seasonal, injectable 12/25/2012   • Influenza, seasonal, injectable, preservative free 09/26/2014   • Pneumococcal Conjugate 13-Valent 12/05/2015, 12/15/2015   • Pneumococcal Polysaccharide PPV23 11/20/2012   • Tdap 10/27/2017   • Zoster 01/01/2013, 11/15/2013      Health Maintenance:         Topic Date Due   • Hepatitis C Screening  Completed         Topic Date Due   • COVID-19 Vaccine (6 - Moderna series) 08/30/2022   • Influenza Vaccine (1) 09/01/2023      Medicare Screening Tests and Risk Assessments:     Last Medicare Wellness visit information reviewed, patient interviewed and updates made to the record today. Health Risk Assessment:   Patient rates overall health as good. Patient feels that their physical health rating is same. Patient is dissatisfied with their life. Eyesight was rated as same. Hearing was rated as slightly worse. Patient feels that their emotional and mental health rating is slightly worse. Patients states they are never, rarely angry. Patient states they are often unusually tired/fatigued. Pain experienced in the last 7 days has been some. Patient's pain rating has been 7/10. Patient states that she has experienced no weight loss or gain in last 6 months. Fall Risk Screening: In the past year, patient has experienced: history of falling in past year    Number of falls: 1  Injured during fall?: No    Feels unsteady when standing or walking?: Yes    Worried about falling?: Yes      Urinary Incontinence Screening:   Patient has not leaked urine accidently in the last six months. Home Safety:  Patient has trouble with stairs inside or outside of their home. Patient has working smoke alarms and has working carbon monoxide detector. Home safety hazards include: none. Nutrition:   Current diet is Regular and No Added Salt. Medications:   Patient is currently taking over-the-counter supplements. OTC medications include: see medication list. Patient is not able to manage medications. Activities of Daily Living (ADLs)/Instrumental Activities of Daily Living (IADLs):   Walk and transfer into and out of bed and chair?: Yes  Dress and groom yourself?: Yes    Bathe or shower yourself?: Yes    Feed yourself? Yes  Do your laundry/housekeeping?: Yes  Manage your money, pay your bills and track your expenses?: No  Make your own meals?: Yes    Do your own shopping?: No    Previous Hospitalizations:   Any hospitalizations or ED visits within the last 12 months?: Yes    How many hospitalizations have you had in the last year?: 1-2    Advance Care Planning:   Living will: Yes    Durable POA for healthcare:  Yes    Advanced directive: Yes    Advanced directive counseling given: Yes    Five wishes given: Yes      Cognitive Screening:   Provider or family/friend/caregiver concerned regarding cognition?: No    PREVENTIVE SCREENINGS      Cardiovascular Screening:    General: Screening Not Indicated and History Lipid Disorder      Diabetes Screening:     General: Risks and Benefits Discussed and Screening Current      Colorectal Cancer Screening: General: Screening Not Indicated      Breast Cancer Screening:     General: Screening Not Indicated      Cervical Cancer Screening:    General: Screening Not Indicated      Osteoporosis Screening:    General: Screening Not Indicated and History Osteoporosis      Abdominal Aortic Aneurysm (AAA) Screening:        General: Screening Not Indicated      Lung Cancer Screening:     General: Screening Not Indicated      Hepatitis C Screening:    General: Screening Current    Screening, Brief Intervention, and Referral to Treatment (SBIRT)    Screening  Typical number of drinks in a day: 0  Typical number of drinks in a week: 0  Interpretation: Low risk drinking behavior. Single Item Drug Screening:  How often have you used an illegal drug (including marijuana) or a prescription medication for non-medical reasons in the past year? never    Single Item Drug Screen Score: 0  Interpretation: Negative screen for possible drug use disorder    Other Counseling Topics:   Car/seat belt/driving safety, skin self-exam, sunscreen and regular weightbearing exercise and calcium and vitamin D intake. No results found. Physical Exam:     There were no vitals taken for this visit. Physical Exam  Vitals and nursing note reviewed.           [facility], DO

## 2023-09-14 NOTE — PATIENT INSTRUCTIONS
Medicare Preventive Visit Patient Instructions  Thank you for completing your Welcome to Medicare Visit or Medicare Annual Wellness Visit today. Your next wellness visit will be due in one year (9/14/2024). The screening/preventive services that you may require over the next 5-10 years are detailed below. Some tests may not apply to you based off risk factors and/or age. Screening tests ordered at today's visit but not completed yet may show as past due. Also, please note that scanned in results may not display below. Preventive Screenings:  Service Recommendations Previous Testing/Comments   Colorectal Cancer Screening  * Colonoscopy    * Fecal Occult Blood Test (FOBT)/Fecal Immunochemical Test (FIT)  * Fecal DNA/Cologuard Test  * Flexible Sigmoidoscopy Age: 43-73 years old   Colonoscopy: every 10 years (may be performed more frequently if at higher risk)  OR  FOBT/FIT: every 1 year  OR  Cologuard: every 3 years  OR  Sigmoidoscopy: every 5 years  Screening may be recommended earlier than age 39 if at higher risk for colorectal cancer. Also, an individualized decision between you and your healthcare provider will decide whether screening between the ages of 77-80 would be appropriate. Colonoscopy: Not on file  FOBT/FIT: Not on file  Cologuard: Not on file  Sigmoidoscopy: Not on file    Screening Not Indicated     Breast Cancer Screening Age: 36 years old  Frequency: every 1-2 years  Not required if history of left and right mastectomy Mammogram: Not on file        Cervical Cancer Screening Between the ages of 21-29, pap smear recommended once every 3 years. Between the ages of 32-69, can perform pap smear with HPV co-testing every 5 years.    Recommendations may differ for women with a history of total hysterectomy, cervical cancer, or abnormal pap smears in past. Pap Smear: Not on file    Screening Not Indicated   Hepatitis C Screening Once for adults born between 1945 and 1965  More frequently in patients at high risk for Hepatitis C Hep C Antibody: 10/03/2022    Screening Current   Diabetes Screening 1-2 times per year if you're at risk for diabetes or have pre-diabetes Fasting glucose: 84 mg/dL (8/9/2022)  A1C: No results in last 5 years (No results in last 5 years)      Cholesterol Screening Once every 5 years if you don't have a lipid disorder. May order more often based on risk factors. Lipid panel: 08/09/2022    Screening Not Indicated  History Lipid Disorder     Other Preventive Screenings Covered by Medicare:  1. Abdominal Aortic Aneurysm (AAA) Screening: covered once if your at risk. You're considered to be at risk if you have a family history of AAA. 2. Lung Cancer Screening: covers low dose CT scan once per year if you meet all of the following conditions: (1) Age 48-67; (2) No signs or symptoms of lung cancer; (3) Current smoker or have quit smoking within the last 15 years; (4) You have a tobacco smoking history of at least 20 pack years (packs per day multiplied by number of years you smoked); (5) You get a written order from a healthcare provider. 3. Glaucoma Screening: covered annually if you're considered high risk: (1) You have diabetes OR (2) Family history of glaucoma OR (3)  aged 48 and older OR (3)  American aged 72 and older  3. Osteoporosis Screening: covered every 2 years if you meet one of the following conditions: (1) You're estrogen deficient and at risk for osteoporosis based off medical history and other findings; (2) Have a vertebral abnormality; (3) On glucocorticoid therapy for more than 3 months; (4) Have primary hyperparathyroidism; (5) On osteoporosis medications and need to assess response to drug therapy. · Last bone density test (DXA Scan): Not on file. 5. HIV Screening: covered annually if you're between the age of 14-79. Also covered annually if you are younger than 13 and older than 72 with risk factors for HIV infection.  For pregnant patients, it is covered up to 3 times per pregnancy. Immunizations:  Immunization Recommendations   Influenza Vaccine Annual influenza vaccination during flu season is recommended for all persons aged >= 6 months who do not have contraindications   Pneumococcal Vaccine   * Pneumococcal conjugate vaccine = PCV13 (Prevnar 13), PCV15 (Vaxneuvance), PCV20 (Prevnar 20)  * Pneumococcal polysaccharide vaccine = PPSV23 (Pneumovax) Adults 20-63 years old: 1-3 doses may be recommended based on certain risk factors  Adults 72 years old: 1-2 doses may be recommended based off what pneumonia vaccine you previously received   Hepatitis B Vaccine 3 dose series if at intermediate or high risk (ex: diabetes, end stage renal disease, liver disease)   Tetanus (Td) Vaccine - COST NOT COVERED BY MEDICARE PART B Following completion of primary series, a booster dose should be given every 10 years to maintain immunity against tetanus. Td may also be given as tetanus wound prophylaxis. Tdap Vaccine - COST NOT COVERED BY MEDICARE PART B Recommended at least once for all adults. For pregnant patients, recommended with each pregnancy. Shingles Vaccine (Shingrix) - COST NOT COVERED BY MEDICARE PART B  2 shot series recommended in those aged 48 and above     Health Maintenance Due:      Topic Date Due   • Hepatitis C Screening  Completed     Immunizations Due:      Topic Date Due   • COVID-19 Vaccine (6 - Moderna series) 08/30/2022   • Influenza Vaccine (1) 09/01/2023     Advance Directives   What are advance directives? Advance directives are legal documents that state your wishes and plans for medical care. These plans are made ahead of time in case you lose your ability to make decisions for yourself. Advance directives can apply to any medical decision, such as the treatments you want, and if you want to donate organs. What are the types of advance directives?   There are many types of advance directives, and each state has rules about how to use them. You may choose a combination of any of the following:  · Living will: This is a written record of the treatment you want. You can also choose which treatments you do not want, which to limit, and which to stop at a certain time. This includes surgery, medicine, IV fluid, and tube feedings. · Durable power of  for healthcare [facility]): This is a written record that states who you want to make healthcare choices for you when you are unable to make them for yourself. This person, called a proxy, is usually a family member or a friend. You may choose more than 1 proxy. · Do not resuscitate (DNR) order:  A DNR order is used in case your heart stops beating or you stop breathing. It is a request not to have certain forms of treatment, such as CPR. A DNR order may be included in other types of advance directives. · Medical directive: This covers the care that you want if you are in a coma, near death, or unable to make decisions for yourself. You can list the treatments you want for each condition. Treatment may include pain medicine, surgery, blood transfusions, dialysis, IV or tube feedings, and a ventilator (breathing machine). · Values history: This document has questions about your views, beliefs, and how you feel and think about life. This information can help others choose the care that you would choose. Why are advance directives important? An advance directive helps you control your care. Although spoken wishes may be used, it is better to have your wishes written down. Spoken wishes can be misunderstood, or not followed. Treatments may be given even if you do not want them. An advance directive may make it easier for your family to make difficult choices about your care. Fall Prevention    Fall prevention  includes ways to make your home and other areas safer. It also includes ways you can move more carefully to prevent a fall.  Health conditions that cause changes in your blood pressure, vision, or muscle strength and coordination may increase your risk for falls. Medicines may also increase your risk for falls if they make you dizzy, weak, or sleepy. Fall prevention tips:   · Stand or sit up slowly. · Use assistive devices as directed. · Wear shoes that fit well and have soles that . · Wear a personal alarm. · Stay active. · Manage your medical conditions. Home Safety Tips:  · Add items to prevent falls in the bathroom. · Keep paths clear. · Install bright lights in your home. · Keep items you use often on shelves within reach. · Paint or place reflective tape on the edges of your stairs. © Copyright [employer] 2018 Information is for End User's use only and may not be sold, redistributed or otherwise used for commercial purposes.  All illustrations and images included in CareNotes® are the copyrighted property of [employer] Inc[employer] or [address] [place] [address]

## 2023-09-19 ENCOUNTER — TELEPHONE (OUTPATIENT)
Dept: FAMILY MEDICINE CLINIC | Facility: CLINIC | Age: 88
End: 2023-09-19

## 2023-09-19 NOTE — TELEPHONE ENCOUNTER
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 I was calling to leave a message with Doctor Aakash Kimbrough about a patient Ricco Bal birthday [birth year]. Just wanted to let him know that I am discharging her from [address] [place] [address] next Tuesday. She will be moving back home and also she had a fall this past weekend on Saturday. No injury, didn't hit her head, no complaints of pain, no issues. The doctor has any questions. Feel free to give me a call back. My cell phone number is [phone]. Thank you.

## 2023-09-28 ENCOUNTER — PROCEDURE VISIT (OUTPATIENT)
Dept: FAMILY MEDICINE CLINIC | Facility: CLINIC | Age: 88
End: 2023-09-28
Payer: MEDICARE

## 2023-09-28 VITALS
BODY MASS INDEX: 20.94 KG/M2 | TEMPERATURE: 98.3 F | HEIGHT: 62 IN | HEART RATE: 67 BPM | OXYGEN SATURATION: 96 % | WEIGHT: 113.8 LBS | RESPIRATION RATE: 16 BRPM | DIASTOLIC BLOOD PRESSURE: 62 MMHG | SYSTOLIC BLOOD PRESSURE: 110 MMHG

## 2023-09-28 DIAGNOSIS — I74.9 THROMBOEMBOLIC DISORDER (HCC): ICD-10-CM

## 2023-09-28 DIAGNOSIS — F32.89 OTHER DEPRESSION: ICD-10-CM

## 2023-09-28 DIAGNOSIS — Z95.0 PACEMAKER: ICD-10-CM

## 2023-09-28 DIAGNOSIS — M17.12 ARTHRITIS OF KNEE, LEFT: Primary | ICD-10-CM

## 2023-09-28 DIAGNOSIS — K21.9 GASTROESOPHAGEAL REFLUX DISEASE: ICD-10-CM

## 2023-09-28 DIAGNOSIS — J45.909 ASTHMA DUE TO ENVIRONMENTAL ALLERGIES: ICD-10-CM

## 2023-09-28 DIAGNOSIS — F41.9 ANXIETY: ICD-10-CM

## 2023-09-28 PROCEDURE — 20610 DRAIN/INJ JOINT/BURSA W/O US: CPT | Performed by: FAMILY MEDICINE

## 2023-09-28 PROCEDURE — 99214 OFFICE O/P EST MOD 30 MIN: CPT | Performed by: FAMILY MEDICINE

## 2023-09-28 RX ORDER — LIDOCAINE HYDROCHLORIDE 10 MG/ML
5 INJECTION, SOLUTION EPIDURAL; INFILTRATION; INTRACAUDAL; PERINEURAL
Status: COMPLETED | OUTPATIENT
Start: 2023-09-28 | End: 2023-09-28

## 2023-09-28 RX ORDER — TRIAMCINOLONE ACETONIDE 40 MG/ML
20 INJECTION, SUSPENSION INTRA-ARTICULAR; INTRAMUSCULAR
Status: COMPLETED | OUTPATIENT
Start: 2023-09-28 | End: 2023-09-28

## 2023-09-28 RX ORDER — ALPRAZOLAM 0.25 MG/1
0.25 TABLET ORAL EVERY 12 HOURS PRN
Qty: 60 TABLET | Refills: 3 | Status: SHIPPED | OUTPATIENT
Start: 2023-09-28

## 2023-09-28 RX ORDER — RIVAROXABAN 20 MG/1
20 TABLET, FILM COATED ORAL DAILY
Qty: 90 TABLET | Refills: 1 | Status: SHIPPED | OUTPATIENT
Start: 2023-09-28

## 2023-09-28 RX ORDER — ESOMEPRAZOLE MAGNESIUM 40 MG/1
40 CAPSULE, DELAYED RELEASE ORAL DAILY
Qty: 90 CAPSULE | Refills: 1 | Status: SHIPPED | OUTPATIENT
Start: 2023-09-28

## 2023-09-28 RX ADMIN — TRIAMCINOLONE ACETONIDE 20 MG: 40 INJECTION, SUSPENSION INTRA-ARTICULAR; INTRAMUSCULAR at 11:30

## 2023-09-28 RX ADMIN — LIDOCAINE HYDROCHLORIDE 5 ML: 10 INJECTION, SOLUTION EPIDURAL; INFILTRATION; INTRACAUDAL; PERINEURAL at 11:30

## 2023-09-28 NOTE — PROGRESS NOTES
Name: Caridad Garcia      : 1928      MRN: [id number]  Encounter Provider: Jacky Shay DO  Encounter Date: 2023   Encounter department: [address] [place] [address]     1. Anxiety  - has been having attacks on occasion regarding her health. She has been on xanax intermittently for some time. Takes only prn. Her son has had to give her 1/2 on occasion. This is OK if it helps her. It is not sedating her, it calms her down. To cont to use prn. PDPMP is c/w Rx. He controls her meds. We did also increase lexapro from 5 --> 10 mg at a recent visit and he feels this helped, overall.    - ALPRAZolam (XANAX) 0.25 mg tablet; Take 1 tablet (0.25 mg total) by mouth every 12 (twelve) hours as needed for anxiety  Dispense: 60 tablet; Refill: 3    2. Gastroesophageal reflux disease  Needs med refills. Honored today. Stable on this. No changes in stool color/consistency. - esomeprazole (NexIUM) 40 MG capsule; Take 1 capsule (40 mg total) by mouth daily  Dispense: 90 capsule; Refill: 1    Other depression  - see above. Mainly stems around her declining health   - Xarelto 20 MG tablet; Take 1 tablet (20 mg total) by mouth daily  Dispense: 90 tablet; Refill: 1    Arthritis of knee, left  - had injection at prior visit of the R knee and this helped her significantly. L knee injected today. Tolerated well. Pacemaker  Has cardiologist.      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 Tolerating this. Could given consideration to dec to 10 mg/day for DVT/PE ppx however will not adjust given longevity of current dose. NO SEs.    - Xarelto 20 MG tablet; Take 1 tablet (20 mg total) by mouth daily  Dispense: 90 tablet; Refill: 1    Large joint arthrocentesis: L knee  Universal Protocol:  Consent: Verbal consent obtained.   Risks and benefits: risks, benefits and alternatives were discussed  Consent given by: patient  Time out: Immediately prior to procedure a "time out" was called to verify the correct patient, procedure, equipment, support staff and site/side marked as required. Patient understanding: patient states understanding of the procedure being performed  Patient consent: the patient's understanding of the procedure matches consent given  Procedure consent: procedure consent matches procedure scheduled  Patient identity confirmed: verbally with patient    Supporting Documentation  Indications: pain and diagnostic evaluation   Procedure Details  Location: knee - L knee  Preparation: Patient was prepped and draped in the usual sterile fashion  Needle size: 22 G  Ultrasound guidance: no  Medications administered: 5 mL lidocaine (PF) 1 %; 20 mg triamcinolone acetonide 40 mg/mL    Patient tolerance: patient tolerated the procedure well with no immediate complications  Dressing:  Sterile dressing applied        Chief Complaint   Patient presents with   • Injections     L knee injection          Subjective      Here for L knee injection. Had R done recently. Helped her and desires L knee to be injected. Has had before as well. Study Result    Narrative & Impression   LEFT KNEE     INDICATION:   M25.561: Pain in right knee  M25.562: Pain in left knee  G89.29: Other chronic pain.     COMPARISON:  7/27/2018     VIEWS:  XR KNEE 4+ VW LEFT INJURY   Images: 4     FINDINGS:     There is no acute fracture or dislocation.     There is no joint effusion.     Persistent mild tricompartmental degenerative arthritis     No lytic or blastic osseous lesion.     There are atherosclerotic calcifications. Soft tissues are otherwise unremarkable.     IMPRESSION:  Persistent tricompartmental degenerative arthritis     No acute osseous abnormality.           Needs refills on her meds -- Nexium - GERD is stable on this. Xarelto, h/o DVT and PE. Has a filter as well. Tolerates. No reports of bleeding.   Also needs Xanax - has increasing anxiety/panic attacks of late when her son leaves. He has been cutting her xanax in 1/2 and giving it to her and it helps her. No sedative effects, helps to calm her down. Needs refill on this, has not had since 2021. Her son has been helping her. We did increase her lexapro after her last visit and this was helpful for her. Review of Systems   All other systems reviewed and are negative.       Current Outpatient Medications on File Prior to Visit   Medication Sig   • acetaminophen (TYLENOL) 500 mg tablet Take 1,000 mg by mouth daily as needed   • albuterol (Ventolin HFA) 90 mcg/act inhaler Inhale 2 puffs every 6 (six) hours as needed for wheezing or shortness of breath   • calcium-vitamin D 250-100 MG-UNIT per tablet Take 1 tablet by mouth 2 (two) times a day   • cetirizine (ZyrTEC) 10 mg tablet Take 1 tablet (10 mg total) by mouth daily OTC   • escitalopram (LEXAPRO) 10 mg tablet Take 1 tablet (10 mg total) by mouth daily   • furosemide (LASIX) 40 mg tablet Take 1 tablet (40 mg total) by mouth 2 (two) times a day   • magnesium hydroxide (MILK OF MAGNESIA) 400 mg/5 mL oral suspension Take by mouth daily as needed for constipation   • Melatonin 5 MG TABS Take 1 tablet (5 mg total) by mouth daily at bedtime   • mirtazapine (REMERON) 15 mg tablet TAKE 1 TABLET (15 MG TOTAL) BY MOUTH DAILY AT BEDTIME   • montelukast (SINGULAIR) 10 mg tablet Take 1 tablet (10 mg total) by mouth daily at bedtime   • multivitamin (THERAGRAN) TABS Take 1 tablet by mouth daily   • potassium chloride (K-DUR,KLOR-CON) 20 mEq tablet Take 1 tablet (20 mEq total) by mouth daily   • [DISCONTINUED] ALPRAZolam (XANAX) 0.25 mg tablet TAKE 1 TABLET BY MOUTH EVERY 12 HOURS AS NEEDED FOR ANXIETY   • [DISCONTINUED] esomeprazole (NexIUM) 40 MG capsule Take 1 capsule (40 mg total) by mouth daily   • [DISCONTINUED] Xarelto 20 MG tablet TAKE 1 TABLET (20 MG TOTAL) BY MOUTH DAILY       Objective     /62   Pulse 67   Temp 98.3 °F (36.8 °C) (Temporal)   Resp 16   Ht 5' 2" (1.575 m)   Wt 51.6 kg (113 lb 12.8 oz)   SpO2 96%   BMI 20.81 kg/m²     Physical Exam  Vitals and nursing note reviewed. Constitutional:       General: She is not in acute distress. Comments: Tired appearing     HENT:      Head: Normocephalic and atraumatic. Eyes:      Conjunctiva/sclera: Conjunctivae normal.   Cardiovascular:      Rate and Rhythm: Normal rate and regular rhythm. Pulmonary:      Effort: Pulmonary effort is normal.      Breath sounds: Normal breath sounds. No wheezing, rhonchi or rales. Musculoskeletal:      Cervical back: Neck supple. Comments: Pain in her L knee. Crepitus with active and passive ROM of b/l knees. Strength is ~ 4/5 b/l knees  No edema. No skin color change. Ligaments intact    Lymphadenopathy:      Cervical: No cervical adenopathy. Skin:     General: Skin is warm and dry. Neurological:      General: No focal deficit present. Mental Status: She is alert and oriented to person, place, and time. Psychiatric:         Mood and Affect: Mood normal.         Behavior: Behavior normal.         Thought Content:  Thought content normal.         Judgment: Judgment normal.       Noemí Savlador,

## 2023-10-30 ENCOUNTER — TELEPHONE (OUTPATIENT)
Dept: FAMILY MEDICINE CLINIC | Facility: CLINIC | Age: 88
End: 2023-10-30

## 2023-10-30 NOTE — TELEPHONE ENCOUNTER
I'm calling for my mother, Virginia Huynh [phone]. She has pretty bad head cold. She's been up the last two nights coughing pretty severely. She's taking Mucinex DM and dulled some and not getting much relief, so if you could give her a call back at [phone], I'd appreciate it. Maybe we need to get a prescription going for her. Pharmacy is [facility] in [facility]. She does not have a fever. Again, pretty bad cough, pretty bad, head cold. Thank you.  Bye.

## 2023-10-31 ENCOUNTER — OFFICE VISIT (OUTPATIENT)
Dept: FAMILY MEDICINE CLINIC | Facility: CLINIC | Age: 88
End: 2023-10-31
Payer: MEDICARE

## 2023-10-31 VITALS
OXYGEN SATURATION: 96 % | DIASTOLIC BLOOD PRESSURE: 70 MMHG | TEMPERATURE: 98.3 F | HEART RATE: 62 BPM | RESPIRATION RATE: 16 BRPM | SYSTOLIC BLOOD PRESSURE: 132 MMHG

## 2023-10-31 DIAGNOSIS — B96.89 ACUTE BACTERIAL BRONCHITIS: Primary | ICD-10-CM

## 2023-10-31 DIAGNOSIS — J20.8 ACUTE BACTERIAL BRONCHITIS: Primary | ICD-10-CM

## 2023-10-31 PROCEDURE — 99212 OFFICE O/P EST SF 10 MIN: CPT | Performed by: FAMILY MEDICINE

## 2023-10-31 RX ORDER — AMOXICILLIN AND CLAVULANATE POTASSIUM 875; 125 MG/1; MG/1
1 TABLET, FILM COATED ORAL EVERY 12 HOURS SCHEDULED
Qty: 14 TABLET | Refills: 0 | Status: SHIPPED | OUTPATIENT
Start: 2023-10-31 | End: 2023-11-07

## 2023-10-31 RX ORDER — BENZONATATE 200 MG/1
200 CAPSULE ORAL 3 TIMES DAILY PRN
Qty: 20 CAPSULE | Refills: 0 | Status: SHIPPED | OUTPATIENT
Start: 2023-10-31

## 2023-10-31 NOTE — PROGRESS NOTES
Name: Caridad Garcia      : 1928      MRN: [id number]  Encounter Provider: Yolanda Caruso DO  Encounter Date: 10/31/2023   Encounter department: [address] yo female with PMH of COPD with acute upper respiratory symptoms, productive cough, concerning for bacterial etiology as symptoms ongoing for almost a week and worsening symptoms. Recommend treatment with antibiotics at this time along with ongoing supportive measures and prn tessalon perles for cough. Patient to follow up if needed if symptoms do not improve, and discussed ER precautions with patient's son. 1. Acute bacterial bronchitis  -     amoxicillin-clavulanate (AUGMENTIN) 875-125 mg per tablet; Take 1 tablet by mouth every 12 (twelve) hours for 7 days  -     benzonatate (TESSALON) 200 MG capsule; Take 1 capsule (200 mg total) by mouth 3 (three) times a day as needed for cough           Subjective      HPI  Chief Complaint   Patient presents with    sick     81 yo female presents with her son for concerns of cough, congestion, SOB, fatigue, for past week. Symptoms overall worsening and patient struggles at nighttime as the cough is getting worse and continue to be productive of thick colored sputum. Her son reports its started out with mostly sore throat and nasal congestion but the past few days has progressed to sounding more in her chest and worsening cough that concerned him to bring her in today for further evaluation. Patient herself reports just feeling ill and exhausted and not having energy. Patient reports she has a sore throat over weekend but this has resolved. Review of Systems   Constitutional:  Positive for appetite change, chills and fatigue. HENT:  Positive for congestion, rhinorrhea and sinus pressure. Negative for ear pain and sore throat. Eyes:  Negative for visual disturbance. Respiratory:  Positive for cough and shortness of breath.     Neurological: Positive for headaches. Current Outpatient Medications on File Prior to Visit   Medication Sig    furosemide (LASIX) 40 mg tablet Take 1 tablet (40 mg total) by mouth 2 (two) times a day (Patient taking differently: Take 40 mg by mouth daily)    acetaminophen (TYLENOL) 500 mg tablet Take 1,000 mg by mouth daily as needed    albuterol (Ventolin HFA) 90 mcg/act inhaler Inhale 2 puffs every 6 (six) hours as needed for wheezing or shortness of breath    ALPRAZolam (XANAX) 0.25 mg tablet Take 1 tablet (0.25 mg total) by mouth every 12 (twelve) hours as needed for anxiety    calcium-vitamin D 250-100 MG-UNIT per tablet Take 1 tablet by mouth 2 (two) times a day    cetirizine (ZyrTEC) 10 mg tablet Take 1 tablet (10 mg total) by mouth daily OTC    escitalopram (LEXAPRO) 10 mg tablet Take 1 tablet (10 mg total) by mouth daily    esomeprazole (NexIUM) 40 MG capsule Take 1 capsule (40 mg total) by mouth daily    magnesium hydroxide (MILK OF MAGNESIA) 400 mg/5 mL oral suspension Take by mouth daily as needed for constipation    Melatonin 5 MG TABS Take 1 tablet (5 mg total) by mouth daily at bedtime    mirtazapine (REMERON) 15 mg tablet TAKE 1 TABLET (15 MG TOTAL) BY MOUTH DAILY AT BEDTIME    montelukast (SINGULAIR) 10 mg tablet Take 1 tablet (10 mg total) by mouth daily at bedtime    multivitamin (THERAGRAN) TABS Take 1 tablet by mouth daily    potassium chloride (K-DUR,KLOR-CON) 20 mEq tablet Take 1 tablet (20 mEq total) by mouth daily    Xarelto 20 MG tablet Take 1 tablet (20 mg total) by mouth daily       Objective     /70   Pulse 62   Temp 98.3 °F (36.8 °C) (Tympanic)   Resp 16   SpO2 96%       Physical Exam  Vitals reviewed. Constitutional:       General: She is not in acute distress. Appearance: She is ill-appearing. HENT:      Right Ear: Tympanic membrane, ear canal and external ear normal.      Left Ear: Tympanic membrane, ear canal and external ear normal.      Nose: Congestion present. Mouth/Throat:      Mouth: Mucous membranes are moist.      Pharynx: Oropharynx is clear. No oropharyngeal exudate or posterior oropharyngeal erythema. Eyes:      Extraocular Movements: Extraocular movements intact. Conjunctiva/sclera: Conjunctivae normal.   Cardiovascular:      Rate and Rhythm: Normal rate and regular rhythm. Pulmonary:      Effort: Pulmonary effort is normal.      Breath sounds: Normal breath sounds. No wheezing, rhonchi or rales. Comments: Audible upper airway congestion  Musculoskeletal:      Cervical back: Neck supple. Lymphadenopathy:      Cervical: Cervical adenopathy present. Neurological:      Mental Status: She is alert.    Psychiatric:         Mood and Affect: Mood normal.           Bebeto Cohen DO

## 2023-12-29 DIAGNOSIS — Z23 ENCOUNTER FOR IMMUNIZATION: Primary | ICD-10-CM

## 2024-01-05 ENCOUNTER — OFFICE VISIT (OUTPATIENT)
Dept: FAMILY MEDICINE CLINIC | Facility: CLINIC | Age: 89
End: 2024-01-05
Payer: MEDICARE

## 2024-01-05 VITALS
TEMPERATURE: 98.4 F | RESPIRATION RATE: 14 BRPM | HEART RATE: 68 BPM | OXYGEN SATURATION: 98 % | DIASTOLIC BLOOD PRESSURE: 78 MMHG | WEIGHT: 115 LBS | BODY MASS INDEX: 21.03 KG/M2 | SYSTOLIC BLOOD PRESSURE: 138 MMHG

## 2024-01-05 DIAGNOSIS — N18.32 STAGE 3B CHRONIC KIDNEY DISEASE (HCC): ICD-10-CM

## 2024-01-05 DIAGNOSIS — S06.5XAA SDH (SUBDURAL HEMATOMA) (HCC): ICD-10-CM

## 2024-01-05 DIAGNOSIS — F32.89 OTHER DEPRESSION: ICD-10-CM

## 2024-01-05 DIAGNOSIS — S72.009S CLOSED FRACTURE OF HIP, UNSPECIFIED LATERALITY, SEQUELA: ICD-10-CM

## 2024-01-05 DIAGNOSIS — R60.0 LOCALIZED EDEMA: ICD-10-CM

## 2024-01-05 DIAGNOSIS — F33.1 MODERATE EPISODE OF RECURRENT MAJOR DEPRESSIVE DISORDER (HCC): ICD-10-CM

## 2024-01-05 DIAGNOSIS — D69.6 THROMBOCYTOPENIA (HCC): ICD-10-CM

## 2024-01-05 DIAGNOSIS — M17.12 ARTHRITIS OF KNEE, LEFT: ICD-10-CM

## 2024-01-05 DIAGNOSIS — K50.919 CROHN'S DISEASE WITH COMPLICATION, UNSPECIFIED GASTROINTESTINAL TRACT LOCATION (HCC): ICD-10-CM

## 2024-01-05 DIAGNOSIS — I60.9 SAH (SUBARACHNOID HEMORRHAGE) (HCC): ICD-10-CM

## 2024-01-05 DIAGNOSIS — I10 BENIGN ESSENTIAL HYPERTENSION: Primary | ICD-10-CM

## 2024-01-05 DIAGNOSIS — J44.9 CHRONIC OBSTRUCTIVE PULMONARY DISEASE, UNSPECIFIED COPD TYPE (HCC): ICD-10-CM

## 2024-01-05 DIAGNOSIS — M17.0 PRIMARY OSTEOARTHRITIS OF BOTH KNEES: ICD-10-CM

## 2024-01-05 DIAGNOSIS — E44.1 MILD PROTEIN-CALORIE MALNUTRITION (HCC): ICD-10-CM

## 2024-01-05 DIAGNOSIS — I50.33 ACUTE ON CHRONIC DIASTOLIC HEART FAILURE (HCC): ICD-10-CM

## 2024-01-05 DIAGNOSIS — S06.5X9A TRAUMATIC SUBDURAL HEMORRHAGE WITH LOSS OF CONSCIOUSNESS OF UNSPECIFIED DURATION, INITIAL ENCOUNTER (HCC): ICD-10-CM

## 2024-01-05 DIAGNOSIS — Z23 ENCOUNTER FOR VACCINATION: ICD-10-CM

## 2024-01-05 DIAGNOSIS — S06.6X9S SUBARACHNOID HEMORRHAGE FOLLOWING INJURY, WITH LOSS OF CONSCIOUSNESS, SEQUELA (HCC): ICD-10-CM

## 2024-01-05 DIAGNOSIS — I50.32 CHRONIC DIASTOLIC HEART FAILURE (HCC): ICD-10-CM

## 2024-01-05 DIAGNOSIS — E87.6 HYPOKALEMIA: ICD-10-CM

## 2024-01-05 DIAGNOSIS — I82.5Y9 CHRONIC VENOUS EMBOLISM AND THROMBOSIS OF DEEP VESSELS OF PROXIMAL LOWER EXTREMITY, UNSPECIFIED LATERALITY (HCC): ICD-10-CM

## 2024-01-05 DIAGNOSIS — I74.9 THROMBOEMBOLIC DISORDER (HCC): ICD-10-CM

## 2024-01-05 PROBLEM — S02.401A CLOSED FRACTURE OF MAXILLARY SINUS (HCC): Status: RESOLVED | Noted: 2017-10-20 | Resolved: 2024-01-05

## 2024-01-05 PROCEDURE — 99215 OFFICE O/P EST HI 40 MIN: CPT | Performed by: FAMILY MEDICINE

## 2024-01-05 RX ORDER — ESCITALOPRAM OXALATE 10 MG/1
10 TABLET ORAL DAILY
Qty: 90 TABLET | Refills: 1 | Status: SHIPPED | OUTPATIENT
Start: 2024-01-05 | End: 2024-07-03

## 2024-01-05 RX ORDER — MIRTAZAPINE 15 MG/1
15 TABLET, FILM COATED ORAL
Qty: 90 TABLET | Refills: 3 | Status: SHIPPED | OUTPATIENT
Start: 2024-01-05

## 2024-01-05 RX ORDER — FUROSEMIDE 40 MG/1
40 TABLET ORAL DAILY
Qty: 30 TABLET | Refills: 3 | Status: SHIPPED | OUTPATIENT
Start: 2024-01-05

## 2024-01-05 RX ORDER — LEVOCETIRIZINE DIHYDROCHLORIDE 5 MG/1
5 TABLET, FILM COATED ORAL AS NEEDED
COMMUNITY

## 2024-01-05 RX ORDER — RIVAROXABAN 20 MG/1
20 TABLET, FILM COATED ORAL DAILY
Qty: 90 TABLET | Refills: 1 | Status: SHIPPED | OUTPATIENT
Start: 2024-01-05

## 2024-01-05 RX ORDER — POTASSIUM CHLORIDE 20 MEQ/1
20 TABLET, EXTENDED RELEASE ORAL DAILY
Qty: 60 TABLET | Refills: 0 | Status: SHIPPED | OUTPATIENT
Start: 2024-01-05

## 2024-01-05 NOTE — PROGRESS NOTES
Assessment/Plan:    Benign essential hypertension  BP at goal.  Continue current.    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    Subarachnoid hemorrhage following injury, with loss of consciousness (HCC)  Stable.  Continue current.    Thrombocytopenia (HCC)  Stable.  Continue current.    Stage 3b chronic kidney disease (HCC)  Lab Results   Component Value Date    EGFR 36 08/09/2022    EGFR 46 11/02/2021    EGFR 48.4 04/12/2016    CREATININE 1.27 08/09/2022    CREATININE 1.04 11/02/2021    CREATININE 1.07 04/12/2016   GFR stable.  Avoid NSAIDs.    Depressed  Stable.  Continue current.    Crohn's disease with complication, unspecified gastrointestinal tract location (HCC)  Stable.  Continue current.    Mild protein-calorie malnutrition (HCC)  Malnutrition Findings:                                 BMI Findings:           Body mass index is 21.03 kg/m².        Diagnoses and all orders for this visit:    Benign essential hypertension    Localized edema  -     furosemide (LASIX) 40 mg tablet; Take 1 tablet (40 mg total) by mouth daily    Other depression  -     mirtazapine (REMERON) 15 mg tablet; Take 1 tablet (15 mg total) by mouth daily at bedtime  -     Xarelto 20 MG tablet; Take 1 tablet (20 mg total) by mouth daily    Hypokalemia  -     potassium chloride (K-DUR,KLOR-CON) 20 mEq tablet; Take 1 tablet (20 mEq total) by mouth daily    Thromboembolic disorder (HCC)  -     Xarelto 20 MG tablet; Take 1 tablet (20 mg total) by mouth daily    Moderate episode of recurrent major depressive disorder (HCC)  -     escitalopram (LEXAPRO) 10 mg tablet; Take 1 tablet (10 mg total) by mouth daily    Mild protein-calorie malnutrition (HCC)    Chronic obstructive pulmonary disease, unspecified COPD type (HCC)    Chronic diastolic heart failure (HCC)    Crohn's disease with complication, unspecified gastrointestinal tract location (HCC)    Thrombocytopenia (HCC)    Traumatic subdural hemorrhage with  loss of consciousness of unspecified duration, initial encounter (HCC)    Stage 3b chronic kidney disease (HCC)    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, unspecified laterality (HCC)    Subarachnoid hemorrhage following injury, with loss of consciousness, sequela (HCC)    SAH (subarachnoid hemorrhage) (HCC)    Closed fracture of hip, unspecified laterality, sequela    Encounter for vaccination  -     RSV Pre-Fusion F A&B Vac Rcmb (Abrysvo) SOLR vaccine; Inject 0.5 mL into a muscle once for 1 dose    Arthritis of knee, left  -     triamcinolone acetonide (KENALOG-40) 40 mg/mL injection 40 mg    Acute on chronic diastolic heart failure (HCC)    SDH (subdural hematoma) (HCC)    Primary osteoarthritis of both knees    Other orders  -     levocetirizine (XYZAL) 5 MG tablet; Take 5 mg by mouth if needed for allergies        I have spent a total time of >40 minutes on 01/07/24 in caring for this patient including Diagnostic results, Prognosis, Risks and benefits of tx options, Instructions for management, Patient and family education, Importance of tx compliance, Risk factor reductions, Impressions, Counseling / Coordination of care, Documenting in the medical record, Reviewing / ordering tests, medicine, procedures  , Obtaining or reviewing history  , and Communicating with other healthcare professionals .   Subjective:      Patient ID: Rajwinder Vaca is a 89 y.o. female.    She has no CP or SOB.    She denies F/C.    She has no bleeding episodes.     She has no side effects from her current regimen.    She can afford her medications.        The following portions of the patient's history were reviewed and updated as appropriate: She  has a past medical history of Abnormal electrocardiogram, Arthritis, Atrial fibrillation (HCC), Closed fracture of maxillary sinus (HCC), Complete atrioventricular block (HCC), Diverticulosis, Fracture of left humerus, GERD (gastroesophageal  reflux disease), Glaucoma, Hiatal hernia, Hypertension, Intermittent claudication ([facility]), Macular degeneration, Pericardial effusion, and Pulmonary embolism ([facility]).  She   Patient Active Problem List    Diagnosis Date Noted    Hypokalemia 01/07/2024    Mild protein-calorie malnutrition ([facility]) 01/05/2024    Arthritis of knee, left 09/28/2023    Thromboembolic disorder ([facility]) 05/18/2023    Chronic obstructive pulmonary disease, unspecified COPD type ([facility]) 05/18/2023    Crohn's disease with complication, unspecified gastrointestinal tract location ([facility]) 05/18/2023    Acute on chronic diastolic heart failure ([facility]) 09/23/2022    Thrombocytopenia ([facility]) 09/23/2022    Epistaxis 09/23/2022    Lupus anticoagulant positive 09/23/2022    Lower extremity edema 05/05/2022    Stage 3b chronic kidney disease ([facility]) 10/21/2021    Advance care planning 11/12/2018    Chronic pain of both knees 07/12/2018    Constipation 02/02/2018    Subarachnoid hemorrhage following injury, with loss of consciousness ([facility]) 11/21/2017    Depressed 10/27/2017    Gait abnormality 10/27/2017    Pacemaker 10/27/2017    S/P hip hemiarthroplasty 10/27/2017    SAH (subarachnoid hemorrhage) ([facility]) 10/27/2017    SDH (subdural hematoma) ([facility]) 10/27/2017    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    Memory impairment 10/25/2017    History of DVT (deep vein thrombosis) 10/20/2017    Hx pulmonary embolism 10/20/2017    Hip fracture ([facility]) 10/19/2017    PHN (postherpetic neuralgia) 02/23/2017    Hemorrhage of anus and rectum 06/30/2016    Chronic GERD 06/15/2016    Ambulatory dysfunction 04/12/2016    Joint pain, hip 01/04/2016    Trochanteric bursitis of left hip 01/04/2016    Enteritis 12/15/2015    Diaphragmatic hernia without obstruction or gangrene 06/01/2015    Seborrheic keratosis 01/23/2015    Diastolic dysfunction 07/15/2014    Fatigue 07/15/2014    Peripheral edema 06/30/2014    Anemia 11/14/2013    Heart block 11/14/2013    Chronic  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 (HCC) 09/10/2013    Sciatica 09/10/2013    Backache 08/30/2013    Vertigo 08/08/2013    Leg swelling 08/01/2013    Deep venous thrombosis of distal lower extremity (HCC) 11/20/2012    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, unspecified laterality 11/20/2012    Osteoarthritis of knee 11/20/2012    Osteoporosis 11/20/2012    Anxiety 11/16/2012    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  Her family history includes Alzheimer's disease in her sister; Colon cancer in her sister; Diabetes in her father; Heart disease in her father; Hypertension in her father and mother.  She  reports that she has never smoked. She has never used smokeless tobacco. She reports that she does not drink alcohol and does not use drugs.  Current Outpatient Medications   Medication Sig Dispense Refill    acetaminophen (TYLENOL) 500 mg tablet Take 1,000 mg by mouth daily as needed      ALPRAZolam (XANAX) 0.25 mg tablet Take 1 tablet (0.25 mg total) by mouth every 12 (twelve) hours as needed for anxiety 60 tablet 3    calcium-vitamin D 250-100 MG-UNIT per tablet Take 1 tablet by mouth 2 (two) times a day      escitalopram (LEXAPRO) 10 mg tablet Take 1 tablet (10 mg total) by mouth daily 90 tablet 1    esomeprazole (NexIUM) 40 MG capsule Take 1 capsule (40 mg total) by mouth daily 90 capsule 1    furosemide (LASIX) 40 mg tablet Take 1 tablet (40 mg total) by mouth daily 30 tablet 3    levocetirizine (XYZAL) 5 MG tablet Take 5 mg by mouth if needed for allergies      Melatonin 5 MG TABS Take 1 tablet (5 mg total) by mouth daily at bedtime 90 tablet 0    mirtazapine (REMERON) 15 mg tablet Take 1 tablet (15 mg total) by mouth daily at bedtime 90 tablet 3    multivitamin (THERAGRAN) TABS Take 1  tablet by mouth daily      potassium chloride (K-DUR,KLOR-CON) 20 mEq tablet Take 1 tablet (20 mEq total) by mouth daily 60 tablet 0    Xarelto 20 MG tablet Take 1 tablet (20 mg total) by mouth daily 90 tablet 1    magnesium hydroxide (MILK OF MAGNESIA) 400 mg/5 mL oral suspension Take by mouth daily as needed for constipation (Patient not taking: Reported on 1/5/2024)       Current Facility-Administered Medications   Medication Dose Route Frequency Provider Last Rate Last Admin    triamcinolone acetonide (KENALOG-40) 40 mg/mL injection 40 mg  40 mg Intramuscular Once Abram Santana MD         Current Outpatient Medications on File Prior to Visit   Medication Sig    acetaminophen (TYLENOL) 500 mg tablet Take 1,000 mg by mouth daily as needed    ALPRAZolam (XANAX) 0.25 mg tablet Take 1 tablet (0.25 mg total) by mouth every 12 (twelve) hours as needed for anxiety    calcium-vitamin D 250-100 MG-UNIT per tablet Take 1 tablet by mouth 2 (two) times a day    esomeprazole (NexIUM) 40 MG capsule Take 1 capsule (40 mg total) by mouth daily    levocetirizine (XYZAL) 5 MG tablet Take 5 mg by mouth if needed for allergies    Melatonin 5 MG TABS Take 1 tablet (5 mg total) by mouth daily at bedtime    multivitamin (THERAGRAN) TABS Take 1 tablet by mouth daily    magnesium hydroxide (MILK OF MAGNESIA) 400 mg/5 mL oral suspension Take by mouth daily as needed for constipation (Patient not taking: Reported on 1/5/2024)     No current facility-administered medications on file prior to visit.     She is allergic to codeine..    Review of Systems   All other systems reviewed and are negative.        Objective:      /78 (BP Location: Left arm, Patient Position: Sitting)   Pulse 68   Temp 98.4 °F (36.9 °C) (Temporal)   Resp 14   Wt 52.2 kg (115 lb)   SpO2 98%   BMI 21.03 kg/m²          Physical Exam  Vitals and nursing note reviewed.   Constitutional:       Appearance: Normal appearance. She is normal weight.    Cardiovascular:      Rate and Rhythm: Normal rate and regular rhythm.      Pulses: Normal pulses.      Heart sounds: Normal heart sounds.   Pulmonary:      Effort: Pulmonary effort is normal.      Breath sounds: Normal breath sounds.   Abdominal:      General: Abdomen is flat. Bowel sounds are normal.      Palpations: Abdomen is soft.   Musculoskeletal:         General: Normal range of motion.      Cervical back: Normal range of motion and neck supple.   Skin:     General: Skin is warm and dry.      Capillary Refill: Capillary refill takes less than 2 seconds.   Neurological:      General: No focal deficit present.      Mental Status: She is alert and oriented to person, place, and time. Mental status is at baseline.   Psychiatric:         Mood and Affect: Mood normal.         Behavior: Behavior normal.         Thought Content: Thought content normal.         Judgment: Judgment normal.

## 2024-01-07 PROBLEM — S72.002A FRACTURE OF FEMORAL NECK, LEFT (HCC): Status: RESOLVED | Noted: 2017-10-27 | Resolved: 2024-01-07

## 2024-01-07 PROBLEM — E87.6 HYPOKALEMIA: Status: ACTIVE | Noted: 2024-01-07

## 2024-01-07 PROBLEM — S42.302A FRACTURE OF LEFT HUMERUS: Status: RESOLVED | Noted: 2017-10-27 | Resolved: 2024-01-07

## 2024-01-07 PROBLEM — I50.33 ACUTE ON CHRONIC DIASTOLIC HEART FAILURE (HCC): Status: ACTIVE | Noted: 2022-09-23

## 2024-01-07 RX ORDER — TRIAMCINOLONE ACETONIDE 40 MG/ML
40 INJECTION, SUSPENSION INTRA-ARTICULAR; INTRAMUSCULAR ONCE
Status: SHIPPED | OUTPATIENT
Start: 2024-01-07

## 2024-01-08 NOTE — ASSESSMENT & PLAN NOTE
Malnutrition Findings:                                 BMI Findings:           Body mass index is 21.03 kg/m².

## 2024-01-08 NOTE — ASSESSMENT & PLAN NOTE
Patient is calling regarding cancelling an appointment  Date/Time: 7/7/2023 & 7/14/2023    Reason: Pt can't do virtual won't be here  Will be discussing at next visit      Patient was rescheduled: YES [] NO [x]  If yes, when was Patient reschedule for:     Patient requesting call back to reschedule: YES [] NO [x] Stable.  Continue current.

## 2024-01-08 NOTE — ASSESSMENT & PLAN NOTE
Lab Results   Component Value Date    EGFR 36 08/09/2022    EGFR 46 11/02/2021    EGFR 48.4 04/12/2016    CREATININE 1.27 08/09/2022    CREATININE 1.04 11/02/2021    CREATININE 1.07 04/12/2016   GFR stable.  Avoid NSAIDs.

## 2024-02-24 DIAGNOSIS — E87.6 HYPOKALEMIA: ICD-10-CM

## 2024-02-26 RX ORDER — POTASSIUM CHLORIDE 20 MEQ/1
20 TABLET, EXTENDED RELEASE ORAL DAILY
Qty: 60 TABLET | Refills: 0 | Status: SHIPPED | OUTPATIENT
Start: 2024-02-26

## 2024-04-15 ENCOUNTER — OFFICE VISIT (OUTPATIENT)
Dept: FAMILY MEDICINE CLINIC | Facility: CLINIC | Age: 89
End: 2024-04-15
Payer: MEDICARE

## 2024-04-15 VITALS
TEMPERATURE: 98 F | DIASTOLIC BLOOD PRESSURE: 78 MMHG | OXYGEN SATURATION: 97 % | BODY MASS INDEX: 21.47 KG/M2 | HEART RATE: 62 BPM | WEIGHT: 117.4 LBS | SYSTOLIC BLOOD PRESSURE: 104 MMHG

## 2024-04-15 DIAGNOSIS — I82.5Y9 CHRONIC VENOUS EMBOLISM AND THROMBOSIS OF DEEP VESSELS OF PROXIMAL LOWER EXTREMITY, UNSPECIFIED LATERALITY (HCC): ICD-10-CM

## 2024-04-15 DIAGNOSIS — M25.561 CHRONIC PAIN OF BOTH KNEES: ICD-10-CM

## 2024-04-15 DIAGNOSIS — I10 BENIGN ESSENTIAL HYPERTENSION: ICD-10-CM

## 2024-04-15 DIAGNOSIS — E87.6 HYPOKALEMIA: ICD-10-CM

## 2024-04-15 DIAGNOSIS — C44.91 BASAL CELL ADENOCARCINOMA: ICD-10-CM

## 2024-04-15 DIAGNOSIS — R60.0 LOCALIZED EDEMA: ICD-10-CM

## 2024-04-15 DIAGNOSIS — K21.9 GASTROESOPHAGEAL REFLUX DISEASE: ICD-10-CM

## 2024-04-15 DIAGNOSIS — G89.29 CHRONIC PAIN OF BOTH KNEES: ICD-10-CM

## 2024-04-15 DIAGNOSIS — M25.562 CHRONIC PAIN OF BOTH KNEES: ICD-10-CM

## 2024-04-15 DIAGNOSIS — J44.9 CHRONIC OBSTRUCTIVE PULMONARY DISEASE, UNSPECIFIED COPD TYPE (HCC): ICD-10-CM

## 2024-04-15 DIAGNOSIS — I50.33 ACUTE ON CHRONIC DIASTOLIC HEART FAILURE (HCC): ICD-10-CM

## 2024-04-15 DIAGNOSIS — R26.2 AMBULATORY DYSFUNCTION: Primary | ICD-10-CM

## 2024-04-15 PROCEDURE — 99214 OFFICE O/P EST MOD 30 MIN: CPT | Performed by: FAMILY MEDICINE

## 2024-04-15 PROCEDURE — 20610 DRAIN/INJ JOINT/BURSA W/O US: CPT | Performed by: FAMILY MEDICINE

## 2024-04-15 RX ORDER — FUROSEMIDE 40 MG/1
40 TABLET ORAL DAILY
Qty: 30 TABLET | Refills: 3 | Status: SHIPPED | OUTPATIENT
Start: 2024-04-15

## 2024-04-15 RX ORDER — POTASSIUM CHLORIDE 20 MEQ/1
20 TABLET, EXTENDED RELEASE ORAL DAILY
Qty: 60 TABLET | Refills: 0 | Status: SHIPPED | OUTPATIENT
Start: 2024-04-15

## 2024-04-15 RX ORDER — ESOMEPRAZOLE MAGNESIUM 40 MG/1
40 CAPSULE, DELAYED RELEASE ORAL DAILY
Qty: 90 CAPSULE | Refills: 1 | Status: SHIPPED | OUTPATIENT
Start: 2024-04-15

## 2024-04-15 RX ORDER — LIDOCAINE HYDROCHLORIDE 10 MG/ML
2 INJECTION, SOLUTION INFILTRATION; PERINEURAL
Status: COMPLETED | OUTPATIENT
Start: 2024-04-15 | End: 2024-04-15

## 2024-04-15 RX ORDER — TRIAMCINOLONE ACETONIDE 40 MG/ML
40 INJECTION, SUSPENSION INTRA-ARTICULAR; INTRAMUSCULAR
Status: COMPLETED | OUTPATIENT
Start: 2024-04-15 | End: 2024-04-15

## 2024-04-15 RX ADMIN — LIDOCAINE HYDROCHLORIDE 2 ML: 10 INJECTION, SOLUTION INFILTRATION; PERINEURAL at 12:30

## 2024-04-15 RX ADMIN — TRIAMCINOLONE ACETONIDE 40 MG: 40 INJECTION, SUSPENSION INTRA-ARTICULAR; INTRAMUSCULAR at 12:30

## 2024-04-15 NOTE — ASSESSMENT & PLAN NOTE
Wt Readings from Last 3 Encounters:   04/15/24 53.3 kg (117 lb 6.4 oz)   01/05/24 52.2 kg (115 lb)   09/28/23 51.6 kg (113 lb 12.8 oz)     Weight stable.  Continue current.

## 2024-04-15 NOTE — PROGRESS NOTES
Large joint arthrocentesis: bilateral knee  Universal Protocol:  Consent: Verbal consent obtained. Written consent not obtained.  Consent given by: patient  Patient understanding: patient states understanding of the procedure being performed  Patient consent: the patient's understanding of the procedure matches consent given  Procedure consent: procedure consent matches procedure scheduled  Relevant documents: relevant documents present and verified  Test results: test results available and properly labeled  Site marked: the operative site was marked  Radiology Images displayed and confirmed. If images not available, report reviewed: imaging studies available  Patient identity confirmed: verbally with patient  Supporting Documentation  Indications: pain   Procedure Details  Location: knee - bilateral knee  Needle size: 20 G  Approach: medial    Medications (Right): 2 mL lidocaine 1 %; 40 mg triamcinolone acetonide 40 mg/mLMedications (Left): 2 mL lidocaine 1 %; 40 mg triamcinolone acetonide 40 mg/mL   Patient tolerance: patient tolerated the procedure well with no immediate complications

## 2024-04-15 NOTE — PROGRESS NOTES
Assessment/Plan:    No problem-specific Assessment & Plan notes found for this encounter.       Diagnoses and all orders for this visit:    Ambulatory dysfunction  -     Ambulatory Referral to Physical Therapy; Future    Gastroesophageal reflux disease  -     esomeprazole (NexIUM) 40 MG capsule; Take 1 capsule (40 mg total) by mouth daily    Localized edema  -     furosemide (LASIX) 40 mg tablet; Take 1 tablet (40 mg total) by mouth daily    Hypokalemia  -     potassium chloride (Klor-Con M20) 20 mEq tablet; Take 1 tablet (20 mEq total) by mouth daily    Acute on chronic diastolic heart failure (HCC)    Basal cell adenocarcinoma  -     Ambulatory Referral to Dermatology; Future    Chronic pain of both knees  -     Diclofenac Sodium (VOLTAREN) 1 %; Apply 4 g topically 4 (four) times a day  -     Large joint arthrocentesis: bilateral knee    Benign essential hypertension    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, unspecified laterality (HCC)    Chronic obstructive pulmonary disease, unspecified COPD type ([facility])          Subjective:      Patient ID: Rajwinder Vaca is a 89 y.o. female.    HPI    The following portions of the patient's history were reviewed and updated as appropriate: She  has a past medical history of Abnormal electrocardiogram, Arthritis, Atrial fibrillation ([facility]), Closed fracture of maxillary sinus (HCC), Complete atrioventricular block (HCC), Diverticulosis, Fracture of left humerus, GERD (gastroesophageal reflux disease), Glaucoma, Hiatal hernia, Hypertension, Intermittent claudication (HCC), Macular degeneration, Pericardial effusion, and Pulmonary embolism ([facility]).  She   Patient Active Problem List    Diagnosis Date Noted    Hypokalemia 01/07/2024    Mild protein-calorie malnutrition (HCC) 01/05/2024    Arthritis of knee, left 09/28/2023    Thromboembolic disorder (HCC) 05/18/2023    Chronic obstructive pulmonary disease, unspecified COPD type ([facility])  05/18/2023    Crohn's disease with complication, unspecified gastrointestinal tract location ([facility]) 05/18/2023    Acute on chronic diastolic heart failure ([facility]) 09/23/2022    Thrombocytopenia ([facility]) 09/23/2022    Epistaxis 09/23/2022    Lupus anticoagulant positive 09/23/2022    Lower extremity edema 05/05/2022    Stage 3b chronic kidney disease ([facility]) 10/21/2021    Advance care planning 11/12/2018    Chronic pain of both knees 07/12/2018    Constipation 02/02/2018    Subarachnoid hemorrhage following injury, with loss of consciousness ([facility]) 11/21/2017    Depressed 10/27/2017    Gait abnormality 10/27/2017    Pacemaker 10/27/2017    S/P hip hemiarthroplasty 10/27/2017    SAH (subarachnoid hemorrhage) ([facility]) 10/27/2017    SDH (subdural hematoma) ([facility]) 10/27/2017    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    Memory impairment 10/25/2017    History of DVT (deep vein thrombosis) 10/20/2017    Hx pulmonary embolism 10/20/2017    Hip fracture ([facility]) 10/19/2017    PHN (postherpetic neuralgia) 02/23/2017    Hemorrhage of anus and rectum 06/30/2016    Chronic GERD 06/15/2016    Ambulatory dysfunction 04/12/2016    Joint pain, hip 01/04/2016    Trochanteric bursitis of left hip 01/04/2016    Enteritis 12/15/2015    Diaphragmatic hernia without obstruction or gangrene 06/01/2015    Seborrheic keratosis 01/23/2015    Diastolic dysfunction 07/15/2014    Fatigue 07/15/2014    Peripheral edema 06/30/2014    Anemia 11/14/2013    Heart block 11/14/2013    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 ([facility]) 09/10/2013    Sciatica 09/10/2013    Backache 08/30/2013    Vertigo 08/08/2013    Leg swelling 08/01/2013    Deep venous thrombosis of distal lower extremity ([facility]) 11/20/2012    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, unspecified laterality 11/20/2012    Osteoarthritis of knee 11/20/2012    Osteoporosis 11/20/2012    Anxiety 11/16/2012    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  Her family history includes Alzheimer's disease in her sister; Colon cancer in her sister; Diabetes in her father; Heart disease in her father; Hypertension in her father and mother.  She  reports that she has never smoked. She has never used smokeless tobacco. She reports that she does not drink alcohol and does not use drugs.  Current Outpatient Medications   Medication Sig Dispense Refill    acetaminophen (TYLENOL) 500 mg tablet Take 1,000 mg by mouth daily as needed      ALPRAZolam (XANAX) 0.25 mg tablet Take 1 tablet (0.25 mg total) by mouth every 12 (twelve) hours as needed for anxiety 60 tablet 3    calcium-vitamin D 250-100 MG-UNIT per tablet Take 1 tablet by mouth 2 (two) times a day      Diclofenac Sodium (VOLTAREN) 1 % Apply 4 g topically 4 (four) times a day 500 g 5    escitalopram (LEXAPRO) 10 mg tablet Take 1 tablet (10 mg total) by mouth daily 90 tablet 1    esomeprazole (NexIUM) 40 MG capsule Take 1 capsule (40 mg total) by mouth daily 90 capsule 1    furosemide (LASIX) 40 mg tablet Take 1 tablet (40 mg total) by mouth daily 30 tablet 3    levocetirizine (XYZAL) 5 MG tablet Take 5 mg by mouth if needed for allergies      mirtazapine (REMERON) 15 mg tablet Take 1 tablet (15 mg total) by mouth daily at bedtime 90 tablet 3    multivitamin (THERAGRAN) TABS Take 1 tablet by mouth daily      potassium chloride (Klor-Con M20) 20 mEq tablet Take 1 tablet (20 mEq total) by mouth daily 60 tablet 0    Xarelto 20 MG tablet Take 1 tablet (20 mg total) by mouth daily 90 tablet 1    magnesium hydroxide (MILK OF MAGNESIA) 400 mg/5 mL oral suspension Take by mouth daily as needed for constipation (Patient not taking: Reported on 1/5/2024)      Melatonin 5 MG TABS Take 1 tablet (5 mg total) by mouth daily at bedtime  (Patient not taking: Reported on 4/15/2024) 90 tablet 0     Current Facility-Administered Medications   Medication Dose Route Frequency Provider Last Rate Last Admin    triamcinolone acetonide (KENALOG-40) 40 mg/mL injection 40 mg  40 mg Intramuscular Once Abram Santana MD         Current Outpatient Medications on File Prior to Visit   Medication Sig    acetaminophen (TYLENOL) 500 mg tablet Take 1,000 mg by mouth daily as needed    ALPRAZolam (XANAX) 0.25 mg tablet Take 1 tablet (0.25 mg total) by mouth every 12 (twelve) hours as needed for anxiety    calcium-vitamin D 250-100 MG-UNIT per tablet Take 1 tablet by mouth 2 (two) times a day    escitalopram (LEXAPRO) 10 mg tablet Take 1 tablet (10 mg total) by mouth daily    levocetirizine (XYZAL) 5 MG tablet Take 5 mg by mouth if needed for allergies    mirtazapine (REMERON) 15 mg tablet Take 1 tablet (15 mg total) by mouth daily at bedtime    multivitamin (THERAGRAN) TABS Take 1 tablet by mouth daily    Xarelto 20 MG tablet Take 1 tablet (20 mg total) by mouth daily    [DISCONTINUED] esomeprazole (NexIUM) 40 MG capsule Take 1 capsule (40 mg total) by mouth daily    [DISCONTINUED] furosemide (LASIX) 40 mg tablet Take 1 tablet (40 mg total) by mouth daily    [DISCONTINUED] potassium chloride (Klor-Con M20) 20 mEq tablet take 1 tablet by mouth once daily    magnesium hydroxide (MILK OF MAGNESIA) 400 mg/5 mL oral suspension Take by mouth daily as needed for constipation (Patient not taking: Reported on 1/5/2024)    Melatonin 5 MG TABS Take 1 tablet (5 mg total) by mouth daily at bedtime (Patient not taking: Reported on 4/15/2024)     Current Facility-Administered Medications on File Prior to Visit   Medication    triamcinolone acetonide (KENALOG-40) 40 mg/mL injection 40 mg     She is allergic to codeine..    Review of Systems   All other systems reviewed and are negative.        Objective:      /78 (BP Location: Left arm, Patient Position: Sitting)   Pulse 62    Temp 98 °F (36.7 °C) (Tympanic)   Wt 53.3 kg (117 lb 6.4 oz)   SpO2 97%   BMI 21.47 kg/m²          Physical Exam  Vitals reviewed.   Constitutional:       Appearance: Normal appearance. She is normal weight.   Cardiovascular:      Rate and Rhythm: Normal rate and regular rhythm.      Pulses: Normal pulses.      Heart sounds: Normal heart sounds.   Pulmonary:      Effort: Pulmonary effort is normal.      Breath sounds: Normal breath sounds.   Abdominal:      General: Abdomen is flat. Bowel sounds are normal.      Palpations: Abdomen is soft.   Musculoskeletal:         General: Normal range of motion.      Cervical back: Normal range of motion and neck supple.   Skin:     General: Skin is warm and dry.      Capillary Refill: Capillary refill takes less than 2 seconds.   Neurological:      General: No focal deficit present.      Mental Status: She is alert and oriented to person, place, and time. Mental status is at baseline.   Psychiatric:         Mood and Affect: Mood normal.         Behavior: Behavior normal.         Thought Content: Thought content normal.         Judgment: Judgment normal.

## 2024-06-07 DIAGNOSIS — E87.6 HYPOKALEMIA: ICD-10-CM

## 2024-06-07 RX ORDER — POTASSIUM CHLORIDE 20 MEQ/1
20 TABLET, EXTENDED RELEASE ORAL DAILY
Qty: 60 TABLET | Refills: 5 | Status: SHIPPED | OUTPATIENT
Start: 2024-06-07

## 2024-08-12 DIAGNOSIS — R60.0 LOCALIZED EDEMA: ICD-10-CM

## 2024-08-13 RX ORDER — FUROSEMIDE 40 MG
40 TABLET ORAL DAILY
Qty: 30 TABLET | Refills: 0 | Status: SHIPPED | OUTPATIENT
Start: 2024-08-13

## 2024-08-21 ENCOUNTER — TELEPHONE (OUTPATIENT)
Age: 89
End: 2024-08-21

## 2024-08-21 NOTE — TELEPHONE ENCOUNTER
Jayant a RN with [facility] called in regards to patient being treated and release from [facility] for shingles. jayant stated that they had put patient on valtrex 100 mg twice daily. Jayant stated that patient has been having changes in her vision when closes her eyes and hearing humming voices. Jayant stated that the only changes patient has had is with the antibiotics and she would like to know if it can get changed to once daily instead of twice daily. Jayant stated to give patients son a call back at [phone] with providers response.

## 2024-08-22 ENCOUNTER — TELEPHONE (OUTPATIENT)
Age: 89
End: 2024-08-22

## 2024-08-22 NOTE — TELEPHONE ENCOUNTER
Pt's son, Raúl, called to scheduled tcm appt. Pt was discharged from hospital on 8/20. He stated he can be reached back today at [phone] and tomorrow at [phone]. Thank you.

## 2024-08-23 ENCOUNTER — TRANSITIONAL CARE MANAGEMENT (OUTPATIENT)
Dept: FAMILY MEDICINE CLINIC | Facility: CLINIC | Age: 89
End: 2024-08-23

## 2024-08-29 ENCOUNTER — OFFICE VISIT (OUTPATIENT)
Dept: FAMILY MEDICINE CLINIC | Facility: CLINIC | Age: 89
End: 2024-08-29
Payer: MEDICARE

## 2024-08-29 VITALS
TEMPERATURE: 96 F | DIASTOLIC BLOOD PRESSURE: 70 MMHG | WEIGHT: 111 LBS | HEIGHT: 62 IN | SYSTOLIC BLOOD PRESSURE: 124 MMHG | OXYGEN SATURATION: 95 % | HEART RATE: 84 BPM | BODY MASS INDEX: 20.43 KG/M2

## 2024-08-29 DIAGNOSIS — R53.82 CHRONIC FATIGUE: ICD-10-CM

## 2024-08-29 DIAGNOSIS — F32.89 OTHER DEPRESSION: ICD-10-CM

## 2024-08-29 DIAGNOSIS — K21.9 GASTROESOPHAGEAL REFLUX DISEASE: ICD-10-CM

## 2024-08-29 DIAGNOSIS — I50.33 ACUTE ON CHRONIC DIASTOLIC HEART FAILURE (HCC): ICD-10-CM

## 2024-08-29 DIAGNOSIS — B02.9 HERPES ZOSTER WITHOUT COMPLICATION: Primary | ICD-10-CM

## 2024-08-29 DIAGNOSIS — I74.9 THROMBOEMBOLIC DISORDER (HCC): ICD-10-CM

## 2024-08-29 DIAGNOSIS — F33.1 MODERATE EPISODE OF RECURRENT MAJOR DEPRESSIVE DISORDER (HCC): ICD-10-CM

## 2024-08-29 PROCEDURE — 99495 TRANSJ CARE MGMT MOD F2F 14D: CPT

## 2024-08-29 RX ORDER — ESOMEPRAZOLE MAGNESIUM 40 MG/1
40 CAPSULE, DELAYED RELEASE ORAL DAILY
Qty: 90 CAPSULE | Refills: 1 | Status: SHIPPED | OUTPATIENT
Start: 2024-08-29

## 2024-08-29 RX ORDER — ESCITALOPRAM OXALATE 10 MG/1
10 TABLET ORAL DAILY
Qty: 90 TABLET | Refills: 1 | Status: SHIPPED | OUTPATIENT
Start: 2024-08-29 | End: 2025-02-25

## 2024-08-29 RX ORDER — RIVAROXABAN 20 MG/1
20 TABLET, FILM COATED ORAL DAILY
Qty: 90 TABLET | Refills: 1 | Status: SHIPPED | OUTPATIENT
Start: 2024-08-29

## 2024-08-29 NOTE — PROGRESS NOTES
Transition of Care Visit  Name: Rajwinder Vaca      : 1928      MRN: [id number]  Encounter Provider: Rubens Douglas PA-C  Encounter Date: 2024   Encounter department: [facility]    Assessment & Plan   1. Herpes zoster without complication  2. Acute on chronic diastolic heart failure (HCC)  -     Comprehensive metabolic panel; Future  -     B-Type Natriuretic Peptide(BNP); Future  -     Magnesium; Future  -     Comprehensive metabolic panel  -     B-Type Natriuretic Peptide(BNP)  -     Magnesium  3. Chronic fatigue  -     Comprehensive metabolic panel; Future  -     TSH, 3rd generation with Free T4 reflex; Future  -     T3; Future  -     T4; Future  -     CBC and differential; Future  -     B-Type Natriuretic Peptide(BNP); Future  -     Magnesium; Future  -     Comprehensive metabolic panel  -     TSH, 3rd generation with Free T4 reflex  -     T3  -     T4  -     CBC and differential  -     B-Type Natriuretic Peptide(BNP)  -     Magnesium  4. Moderate episode of recurrent major depressive disorder (HCC)  -     escitalopram (LEXAPRO) 10 mg tablet; Take 1 tablet (10 mg total) by mouth daily  5. Other depression  -     Xarelto 20 MG tablet; Take 1 tablet (20 mg total) by mouth daily  6. Thromboembolic disorder (HCC)  -     Xarelto 20 MG tablet; Take 1 tablet (20 mg total) by mouth daily  7. Gastroesophageal reflux disease  -     esomeprazole (NexIUM) 40 MG capsule; Take 1 capsule (40 mg total) by mouth daily  Educated on possible complications and sequelae of shingles including postherpetic neuralgia.  Will get lab work to monitor electrolytes, BNP, and test for chronic fatigue.  This could very well be due to past medical conditions with the chronic fatigue, but will check underlying lab work.  Will follow-up pending results.  Educated on signs and symptoms of worsening, and is to go to the ER if these present.       History of Present Illness     Transitional Care Management  Review:   Rajwinder Vaca is a 89 y.o. female here for TCM follow up.     During the TCM phone call patient stated:  TCM Call       Date and time call was made  8/20/2024  9:05 AM    Patient was hospitialized at  [facility]    Date of Admission  08/19/24    Date of discharge  08/20/24    Disposition  Home    Were the patients medications reviewed and updated  No    Current Symptoms  None          TCM Call       Post hospital issues  None    Should patient be enrolled in anticoag monitoring?  No    Scheduled for follow up?  Yes    Did you obtain your prescribed medications  Yes    Do you need help managing your prescriptions or medications  No    Is transportation to your appointment needed  No    I have advised the patient to call PCP with any new or worsening symptoms  Brianna Peng MA    Living Arrangements  Children    Are you recieving any outpatient services  Yes    Are you recieving home care services  Yes    Are you using any community resources  No    Current waiver services  No    Have you fallen in the last 12 months  No    Interperter language line needed  No    Counseling  Family          Patient is a 89-year-old female presenting for TCM.  Went to the ED and was held for observation.  Feels much better today in regards to shingles and possible decompensating heart failure.  Leg swelling has improved.  Is taking 80 mg of Lasix for 3 days.  Has improved with this with no side effects.  Denies decreased urination, heart palpitations, chest pain, dizziness, syncope.  Does have chronic fatigue that has not been worsening.  Would like lab work to check causes of this.    Hospital course:  This is a brief description of the patient's hospital stay; please refer to medical chart for further details. Rajwinder Vaca is a 89 y.o. female W/ PMHx significant but not limited to diastolic HF, SSS/CHB/perm afib s/p PPM; GERD, basal cell adenocarcinoma; chronic VTE, lupus AC+ on xarelto; HTN, CKD3b, hx of  traumatic SAH/SDH who presented to [facility] on 8/19/2024 12:24 PM with complaint of 5 days of LUE shooting, sharp pain on anterior L shoulder to elbow. Not able to reproduce, son visiting patient noted rash on L arm and leg swelling and brought to ED for evaluation. Rash scattered but appears to follow C5/C6 dermatomes, macular w/ sporadic vesicles some healing concerning for Zoster. Additional mild volume overload w/ LE edema s/p Lasix 40 mg IV in ED improved to baseline and resumed home Lasix 40 mg PO QD w/ Kdur supplement. Started on Valtrex in ED 1000 mg BID dosing given CKD and on diuretics as OP. Noted slight spread the thenar aspect, not vesicular. Derm consulted, skin scraping sent, suspect Zoster. Will arrange follow up at OP office. Noted improvement w/ Robaxin though exam not MSK does have OA and possibly getting secondary benefit. Scripts for APAP, Valtrex, Robaxin sent. Discussed low salt diet. Ref CARES. PCP follow up discussed. Stable for d/c, strict RTED instructions provided all questions addressed.      Review of Systems   Constitutional:  Positive for fatigue. Negative for appetite change, chills, diaphoresis and fever.   HENT:  Negative for congestion, ear discharge, ear pain, postnasal drip, rhinorrhea, sinus pressure, sinus pain, sneezing and sore throat.    Eyes:  Negative for pain, discharge, redness, itching and visual disturbance.   Respiratory:  Negative for apnea, cough, chest tightness, shortness of breath and wheezing.    Cardiovascular:  Positive for leg swelling. Negative for chest pain and palpitations.   Gastrointestinal:  Negative for abdominal pain, blood in stool, constipation, diarrhea, nausea and vomiting.   Endocrine: Negative for cold intolerance, heat intolerance, polydipsia and polyuria.   Genitourinary:  Negative for dysuria, flank pain, frequency, hematuria and urgency.   Musculoskeletal:  Negative for arthralgias, back pain, myalgias, neck pain and neck  "stiffness.   Skin:  Negative for color change and rash.   Allergic/Immunologic: Negative for environmental allergies and food allergies.   Neurological:  Negative for dizziness, tremors, seizures, syncope, speech difficulty, weakness, light-headedness, numbness and headaches.   Hematological:  Negative for adenopathy. Does not bruise/bleed easily.   Psychiatric/Behavioral:  Negative for agitation, confusion, decreased concentration, dysphoric mood, hallucinations, self-injury, sleep disturbance and suicidal ideas. The patient is not nervous/anxious and is not hyperactive.    All other systems reviewed and are negative.    Objective     /70 (BP Location: Right arm, Patient Position: Sitting)   Pulse 84   Temp (!) 96 °F (35.6 °C) (Tympanic)   Ht 5' 2\" (1.575 m)   Wt 50.3 kg (111 lb)   SpO2 95%   BMI 20.30 kg/m²     Physical Exam  Vitals and nursing note reviewed.   Constitutional:       General: She is not in acute distress.     Appearance: Normal appearance. She is well-developed and normal weight. She is not ill-appearing, toxic-appearing or diaphoretic.   HENT:      Head: Normocephalic and atraumatic.      Right Ear: Tympanic membrane normal.      Left Ear: Tympanic membrane normal.      Nose: Nose normal.      Mouth/Throat:      Mouth: Mucous membranes are moist.      Pharynx: Oropharynx is clear.   Eyes:      Conjunctiva/sclera: Conjunctivae normal.      Pupils: Pupils are equal, round, and reactive to light.   Cardiovascular:      Rate and Rhythm: Normal rate and regular rhythm.      Pulses: Normal pulses.      Heart sounds: Normal heart sounds. No murmur heard.  Pulmonary:      Effort: Pulmonary effort is normal. No respiratory distress.      Breath sounds: Normal breath sounds. No stridor. No wheezing, rhonchi or rales.   Chest:      Chest wall: No tenderness.   Abdominal:      General: Bowel sounds are normal.      Palpations: Abdomen is soft. There is no mass.      Tenderness: There is no " abdominal tenderness.   Musculoskeletal:         General: No swelling or tenderness. Normal range of motion.      Cervical back: Normal range of motion and neck supple. No tenderness.      Right lower leg: Edema (1+) present.      Left lower leg: Edema (1+) present.   Lymphadenopathy:      Cervical: No cervical adenopathy.   Skin:     General: Skin is warm and dry.      Capillary Refill: Capillary refill takes less than 2 seconds.      Findings: No erythema, lesion or rash.   Neurological:      General: No focal deficit present.      Mental Status: She is alert and oriented to person, place, and time. Mental status is at baseline.      Motor: No weakness.      Coordination: Coordination normal.      Gait: Gait normal.   Psychiatric:         Mood and Affect: Mood normal.         Behavior: Behavior normal.         Thought Content: Thought content normal.         Judgment: Judgment normal.       Medications have been reviewed by provider in current encounter    Administrative Statements

## 2024-09-09 DIAGNOSIS — R60.0 LOCALIZED EDEMA: ICD-10-CM

## 2024-09-10 RX ORDER — FUROSEMIDE 40 MG
40 TABLET ORAL DAILY
Qty: 30 TABLET | Refills: 5 | Status: SHIPPED | OUTPATIENT
Start: 2024-09-10

## 2024-09-18 ENCOUNTER — TELEPHONE (OUTPATIENT)
Dept: FAMILY MEDICINE CLINIC | Facility: CLINIC | Age: 89
End: 2024-09-18

## 2024-09-18 DIAGNOSIS — B02.9 HERPES ZOSTER WITHOUT COMPLICATION: Primary | ICD-10-CM

## 2024-09-18 RX ORDER — VALACYCLOVIR HYDROCHLORIDE 1 G/1
1000 TABLET, FILM COATED ORAL 3 TIMES DAILY
Qty: 21 TABLET | Refills: 0 | Status: SHIPPED | OUTPATIENT
Start: 2024-09-18 | End: 2024-09-25

## 2024-09-18 NOTE — TELEPHONE ENCOUNTER
Pts son came into office, states pt is having shingles flare up.  Asking if there something can be sent to pharmacy to help.   Thank you.

## 2024-09-24 ENCOUNTER — OFFICE VISIT (OUTPATIENT)
Dept: FAMILY MEDICINE CLINIC | Facility: CLINIC | Age: 89
End: 2024-09-24
Payer: MEDICARE

## 2024-09-24 VITALS
OXYGEN SATURATION: 97 % | HEART RATE: 79 BPM | HEIGHT: 62 IN | WEIGHT: 111 LBS | SYSTOLIC BLOOD PRESSURE: 110 MMHG | DIASTOLIC BLOOD PRESSURE: 64 MMHG | BODY MASS INDEX: 20.43 KG/M2 | TEMPERATURE: 97.7 F

## 2024-09-24 DIAGNOSIS — F41.9 ANXIETY: ICD-10-CM

## 2024-09-24 DIAGNOSIS — B02.29 PHN (POSTHERPETIC NEURALGIA): Primary | ICD-10-CM

## 2024-09-24 PROCEDURE — 99213 OFFICE O/P EST LOW 20 MIN: CPT

## 2024-09-24 RX ORDER — LIDOCAINE 50 MG/G
1 PATCH TOPICAL DAILY
Qty: 30 PATCH | Refills: 5 | Status: SHIPPED | OUTPATIENT
Start: 2024-09-24

## 2024-09-24 NOTE — PROGRESS NOTES
"Ambulatory Visit  Name: Rajwinder Vaca      : 1928      MRN: [id number]  Encounter Provider: Kelvin Burch DO  Encounter Date: 2024   Encounter department: [facility]    Assessment & Plan  PHN (postherpetic neuralgia)  Trial of lidoderm patches  Orders:    lidocaine (Lidoderm) 5 %; Apply 1 patch topically over 12 hours daily Remove & Discard patch within 12 hours or as directed by MD       History of Present Illness     Patient was diagnosed with shingles a month ago and was prescribed Valacyclovir. She still complains of left shoulder, arm, and forearm pain, though she says that arm pain is the worst.         Review of Systems   Constitutional:  Positive for fatigue. Negative for fever.           Objective     /64   Pulse 79   Ht 5' 2\" (1.575 m)   Wt 50.3 kg (111 lb) Comment: per previous visit  SpO2 97%   BMI 20.30 kg/m²     Physical Exam  Vitals reviewed.   Constitutional:       General: She is not in acute distress.     Appearance: Normal appearance. She is normal weight. She is not ill-appearing.   Cardiovascular:      Rate and Rhythm: Normal rate and regular rhythm.      Heart sounds: Normal heart sounds.   Pulmonary:      Effort: Pulmonary effort is normal.      Breath sounds: Normal breath sounds.   Skin:     Comments: 1 cm slightly erythematous patch with central scabbing over left scapular spine, and another on left anterior arm   Neurological:      Mental Status: She is alert.         "

## 2024-09-24 NOTE — ASSESSMENT & PLAN NOTE
Trial of lidoderm patches  Orders:    lidocaine (Lidoderm) 5 %; Apply 1 patch topically over 12 hours daily Remove & Discard patch within 12 hours or as directed by MD

## 2024-09-25 RX ORDER — ALPRAZOLAM 0.25 MG
0.25 TABLET ORAL EVERY 12 HOURS PRN
Qty: 60 TABLET | Refills: 5 | Status: SHIPPED | OUTPATIENT
Start: 2024-09-25

## 2024-10-01 ENCOUNTER — TELEPHONE (OUTPATIENT)
Dept: FAMILY MEDICINE CLINIC | Facility: CLINIC | Age: 89
End: 2024-10-01

## 2024-10-01 DIAGNOSIS — E44.0 MODERATE PROTEIN-CALORIE MALNUTRITION (HCC): Primary | ICD-10-CM

## 2024-10-01 NOTE — TELEPHONE ENCOUNTER
Pts son called, left message asking if pt can be given a protein supplement or powder due to her dietary issues and hernia dx  ty

## 2024-10-09 DIAGNOSIS — E87.6 HYPOKALEMIA: ICD-10-CM

## 2024-10-10 RX ORDER — POTASSIUM CHLORIDE 1500 MG/1
20 TABLET, EXTENDED RELEASE ORAL DAILY
Qty: 90 TABLET | Refills: 5 | Status: SHIPPED | OUTPATIENT
Start: 2024-10-10

## 2024-10-11 DIAGNOSIS — R60.0 LOCALIZED EDEMA: ICD-10-CM

## 2024-10-11 RX ORDER — FUROSEMIDE 40 MG
40 TABLET ORAL DAILY
Qty: 90 TABLET | Refills: 1 | Status: SHIPPED | OUTPATIENT
Start: 2024-10-11

## 2024-10-25 ENCOUNTER — PROCEDURE VISIT (OUTPATIENT)
Dept: FAMILY MEDICINE CLINIC | Facility: CLINIC | Age: 89
End: 2024-10-25
Payer: MEDICARE

## 2024-10-25 VITALS
BODY MASS INDEX: 20.8 KG/M2 | DIASTOLIC BLOOD PRESSURE: 76 MMHG | RESPIRATION RATE: 18 BRPM | HEART RATE: 75 BPM | WEIGHT: 113 LBS | SYSTOLIC BLOOD PRESSURE: 122 MMHG | HEIGHT: 62 IN | OXYGEN SATURATION: 96 %

## 2024-10-25 DIAGNOSIS — L60.0 INGROWN NAIL OF GREAT TOE OF RIGHT FOOT: ICD-10-CM

## 2024-10-25 DIAGNOSIS — H61.21 IMPACTED CERUMEN OF RIGHT EAR: Primary | ICD-10-CM

## 2024-10-25 DIAGNOSIS — L60.0 INGROWN NAIL OF GREAT TOE OF LEFT FOOT: ICD-10-CM

## 2024-10-25 PROCEDURE — 99214 OFFICE O/P EST MOD 30 MIN: CPT | Performed by: PHYSICIAN ASSISTANT

## 2024-10-25 NOTE — PROGRESS NOTES
"Ambulatory Visit  Name: Rajwinder Vaca      : 1928      MRN: [id number]  Encounter Provider: Emma Kaur PA-C  Encounter Date: 10/25/2024   Encounter department: [facility]    Assessment & Plan  Impacted cerumen of right ear  Unsuccess flushing given adhearant wax and time constrant as patien's  needed to leave. Recommend debrox 2x daily for 1 month and return for additional flushing.     Orders:    carbamide peroxide (DEBROX) 6.5 % otic solution; Administer 5 drops to the right ear 2 (two) times a day    Ingrown nail of great toe of left foot  Noninfected. soak in epson salt x20 minutes. Offered podiatry referral which they declined        Ingrown nail of great toe of right foot  Noninfected. soak in epson salt x20 minutes. Offered podiatry referral which they declined           History of Present Illness     89 y/o female presents with family friend, , for decreased hearing and ingrown nail    Known hearing loss. Does have earing aid for right ear x3 years but noticed decline over last few weeks. Previously followed with ENT for routine ear flushing but has not in several years. No ear pain or discharge.    Concerned with ingrown toe nails. Follows with podiatry in [place] Dr. Cavanaugh but is unable to get onto the second floor secondary to mobility concerns? Has no pain currently         History obtained from : patient and friend -    Review of Systems   HENT:  Negative for ear discharge, ear pain and tinnitus.         Decreased hearing      Medical History Reviewed by provider this encounter:           Objective     /76 (BP Location: Left arm, Patient Position: Sitting, Cuff Size: Standard)   Pulse 75   Resp 18   Ht 5' 2\" (1.575 m)   Wt 51.3 kg (113 lb)   SpO2 96%   BMI 20.67 kg/m²     Physical Exam  Constitutional:       Appearance: Normal appearance. She is normal weight.   HENT:      Head: Normocephalic and atraumatic.      Right Ear: " There is impacted cerumen.      Left Ear: Tympanic membrane normal. There is no impacted cerumen.   Feet:      Right foot:      Toenail Condition: Right toenails are abnormally thick, long and ingrown.      Left foot:      Toenail Condition: Left toenails are abnormally thick, long and ingrown.   Neurological:      Mental Status: She is alert and oriented to person, place, and time. Mental status is at baseline.   Psychiatric:         Mood and Affect: Mood normal.         Behavior: Behavior normal.         Thought Content: Thought content normal.         Judgment: Judgment normal.       Ear cerumen removal    Date/Time: 10/25/2024 1:00 PM    Performed by: Emma Kaur PA-C  Authorized by: Emma Kaur PA-C  Universal Protocol:  Procedure performed by:  Consent given by: patient  Patient identity confirmed: verbally with patient    Patient location:  Clinic  Procedure details:     Location:  R ear    Procedure type: irrigation with instrumentation      Instrumentation: curette      Approach:  External  Post-procedure details:     Complication:  None    Post-procedure hearing quality: no improvement.    Patient tolerance of procedure:  Tolerated well, no immediate complications      Administrative Statements   I have spent a total time of 40 minutes in caring for this patient on the day of the visit/encounter including Risks and benefits of tx options, Instructions for management, Patient and family education, Importance of tx compliance, Risk factor reductions, Impressions, Counseling / Coordination of care, Documenting in the medical record, Reviewing / ordering tests, medicine, procedures  , and Obtaining or reviewing history  .

## 2024-10-31 ENCOUNTER — OFFICE VISIT (OUTPATIENT)
Dept: FAMILY MEDICINE CLINIC | Facility: CLINIC | Age: 89
End: 2024-10-31
Payer: MEDICARE

## 2024-10-31 VITALS
OXYGEN SATURATION: 96 % | DIASTOLIC BLOOD PRESSURE: 68 MMHG | HEART RATE: 70 BPM | WEIGHT: 113 LBS | SYSTOLIC BLOOD PRESSURE: 118 MMHG | HEIGHT: 62 IN | BODY MASS INDEX: 20.8 KG/M2

## 2024-10-31 DIAGNOSIS — I51.89 DIASTOLIC DYSFUNCTION: ICD-10-CM

## 2024-10-31 DIAGNOSIS — H61.21 IMPACTED CERUMEN OF RIGHT EAR: Primary | ICD-10-CM

## 2024-10-31 DIAGNOSIS — L60.0 INGROWN TOENAIL: ICD-10-CM

## 2024-10-31 DIAGNOSIS — K44.9 DIAPHRAGMATIC HERNIA WITHOUT OBSTRUCTION OR GANGRENE: ICD-10-CM

## 2024-10-31 DIAGNOSIS — H91.92 HEARING DECREASED, LEFT: ICD-10-CM

## 2024-10-31 DIAGNOSIS — I10 BENIGN ESSENTIAL HYPERTENSION: ICD-10-CM

## 2024-10-31 PROCEDURE — 99214 OFFICE O/P EST MOD 30 MIN: CPT

## 2024-10-31 NOTE — ASSESSMENT & PLAN NOTE
Hearing decreased since 8-year-old from a fever, ear examined, normal, want to get hearing aids for the left.  Orders:    Ambulatory Referral to Audiology; Future

## 2024-10-31 NOTE — ASSESSMENT & PLAN NOTE
Blood pressure 118/68 today, adequate blood pressure control at home, no concern, taking her medications.

## 2024-10-31 NOTE — ASSESSMENT & PLAN NOTE
Bilateral edema observed in the legs bilaterally, no increasing edema, taking her medications, no concern of exacerbation.

## 2024-10-31 NOTE — ASSESSMENT & PLAN NOTE
Ear irrigated today, with dislodging of external cerumen, the rest of cerumen looks liquefied, provided advised to let cerumen fall off by itself.  Continue Debrox twice daily.  Orders:    Ambulatory Referral to Audiology; Future    Ear cerumen removal

## 2024-10-31 NOTE — PROGRESS NOTES
Ambulatory Visit  Name: Rajwinder Vaca      : 1928      MRN: [id number]  Encounter Provider: Darci Hester DO  Encounter Date: 10/31/2024   Encounter department: [facility]    Assessment & Plan  Impacted cerumen of right ear  Ear irrigated today, with dislodging of external cerumen, the rest of cerumen looks liquefied, provided advised to let cerumen fall off by itself.  Continue Debrox twice daily.  Orders:    Ambulatory Referral to Audiology; Future    Ear cerumen removal    Hearing decreased, left  Hearing decreased since 8-year-old from a fever, ear examined, normal, want to get hearing aids for the left.  Orders:    Ambulatory Referral to Audiology; Future    Benign essential hypertension  Blood pressure 118/68 today, adequate blood pressure control at home, no concern, taking her medications.       Diaphragmatic hernia without obstruction or gangrene  Discovered years ago, does not affect breathing currently.       Diastolic dysfunction  Bilateral edema observed in the legs bilaterally, no increasing edema, taking her medications, no concern of exacerbation.       Ingrown toenail  Examined today, no concerns of acute infection for bilateral big toe          History of Present Illness     HPI  89-year-old female with past medical history of CHF, impacted cerumen on the right, which was not removed completely in the last visit, came in today for earwax removal.  Patient otherwise is doing well, without concerns.      Review of Systems   Constitutional:  Negative for chills and fever.   HENT:  Positive for hearing loss.    Respiratory:  Negative for shortness of breath.    Cardiovascular:  Positive for leg swelling (Chronic, not increased). Negative for chest pain.     Pertinent Medical History         Medical History Reviewed by provider this encounter:       Past Medical History   Past Medical History:   Diagnosis Date    Abnormal electrocardiogram     Last Assessed:  21mar2013    Arthritis     Atrial fibrillation (HCC)     Last Assessed: 29Sep2016    Closed fracture of maxillary sinus (HCC)     Complete atrioventricular block (HCC)     Last Assessed: 21Mar2013    Diverticulosis     Last Assessed: 21Mar2013    Fracture of left humerus     GERD (gastroesophageal reflux disease)     Last Assessed: 21Mar2013    Glaucoma     Hiatal hernia     Hypertension     Resolved: 12Aug2014    Intermittent claudication (HCC)     Last Assessed: 23Jan2015    Macular degeneration     Last Assessed: 21Mar2013    Pericardial effusion     Pulmonary embolism (HCC)      Past Surgical History:   Procedure Laterality Date    APPENDECTOMY      HYSTERECTOMY      TONSILLECTOMY AND ADENOIDECTOMY       Family History   Problem Relation Age of Onset    Hypertension Mother     Diabetes Father     Heart disease Father     Hypertension Father     Alzheimer's disease Sister     Colon cancer Sister      Current Outpatient Medications on File Prior to Visit   Medication Sig Dispense Refill    ALPRAZolam (XANAX) 0.25 mg tablet TAKE 1 TABLET BY MOUTH EVERY 12 HOURS AS NEEDED FOR ANXIETY 60 tablet 5    benzocaine-menthol (CEPACOL) 15-3.6 mg per lozenge       calcium-vitamin D 250-100 MG-UNIT per tablet Take 1 tablet by mouth 2 (two) times a day      carbamide peroxide (DEBROX) 6.5 % otic solution Administer 5 drops to the right ear 2 (two) times a day 15 mL 0    escitalopram (LEXAPRO) 10 mg tablet Take 1 tablet (10 mg total) by mouth daily 90 tablet 1    esomeprazole (NexIUM) 40 MG capsule Take 1 capsule (40 mg total) by mouth daily 90 capsule 1    furosemide (LASIX) 40 mg tablet take 1 tablet by mouth once daily 90 tablet 1    lidocaine (Lidoderm) 5 % Apply 1 patch topically over 12 hours daily Remove & Discard patch within 12 hours or as directed by MD 30 patch 5    mirtazapine (REMERON) 15 mg tablet Take 1 tablet (15 mg total) by mouth daily at bedtime 90 tablet 3    multivitamin (THERAGRAN) TABS Take 1 tablet by  mouth daily      Nutritional Supplements (Boost High Protein) LIQD Take 1 Bottle by mouth 2 (two) times a day 237 mL 5    potassium chloride (Klor-Con M20) 20 mEq tablet take 1 tablet by mouth once daily 90 tablet 5    valACYclovir (VALTREX) 1,000 mg tablet Take 1 tablet (1,000 mg total) by mouth 3 (three) times a day for 7 days (Patient not taking: Reported on 10/25/2024) 21 tablet 0    Xarelto 20 MG tablet Take 1 tablet (20 mg total) by mouth daily 90 tablet 1     Current Facility-Administered Medications on File Prior to Visit   Medication Dose Route Frequency Provider Last Rate Last Admin    triamcinolone acetonide (KENALOG-40) 40 mg/mL injection 40 mg  40 mg Intramuscular Once Abram Santana MD         Allergies   Allergen Reactions    Codeine Other (See Comments)     Like I was floating  Felt dizzy. Took codeine many years ago      Current Outpatient Medications on File Prior to Visit   Medication Sig Dispense Refill    ALPRAZolam (XANAX) 0.25 mg tablet TAKE 1 TABLET BY MOUTH EVERY 12 HOURS AS NEEDED FOR ANXIETY 60 tablet 5    benzocaine-menthol (CEPACOL) 15-3.6 mg per lozenge       calcium-vitamin D 250-100 MG-UNIT per tablet Take 1 tablet by mouth 2 (two) times a day      carbamide peroxide (DEBROX) 6.5 % otic solution Administer 5 drops to the right ear 2 (two) times a day 15 mL 0    escitalopram (LEXAPRO) 10 mg tablet Take 1 tablet (10 mg total) by mouth daily 90 tablet 1    esomeprazole (NexIUM) 40 MG capsule Take 1 capsule (40 mg total) by mouth daily 90 capsule 1    furosemide (LASIX) 40 mg tablet take 1 tablet by mouth once daily 90 tablet 1    lidocaine (Lidoderm) 5 % Apply 1 patch topically over 12 hours daily Remove & Discard patch within 12 hours or as directed by MD 30 patch 5    mirtazapine (REMERON) 15 mg tablet Take 1 tablet (15 mg total) by mouth daily at bedtime 90 tablet 3    multivitamin (THERAGRAN) TABS Take 1 tablet by mouth daily      Nutritional Supplements (Boost High Protein) LIQD  "Take 1 Bottle by mouth 2 (two) times a day 237 mL 5    potassium chloride (Klor-Con M20) 20 mEq tablet take 1 tablet by mouth once daily 90 tablet 5    valACYclovir (VALTREX) 1,000 mg tablet Take 1 tablet (1,000 mg total) by mouth 3 (three) times a day for 7 days (Patient not taking: Reported on 10/25/2024) 21 tablet 0    Xarelto 20 MG tablet Take 1 tablet (20 mg total) by mouth daily 90 tablet 1     Current Facility-Administered Medications on File Prior to Visit   Medication Dose Route Frequency Provider Last Rate Last Admin    triamcinolone acetonide (KENALOG-40) 40 mg/mL injection 40 mg  40 mg Intramuscular Once Abram Santana MD          Social History     Tobacco Use    Smoking status: Never     Passive exposure: Never    Smokeless tobacco: Never   Vaping Use    Vaping status: Never Used   Substance and Sexual Activity    Alcohol use: No    Drug use: No    Sexual activity: Not Currently         Objective     /68   Pulse 70   Ht 5' 2\" (1.575 m)   Wt 51.3 kg (113 lb) Comment: per previous visit  SpO2 96%   BMI 20.67 kg/m²     Physical Exam  Vitals and nursing note reviewed.   Constitutional:       General: She is not in acute distress.     Appearance: She is well-developed.   HENT:      Head: Normocephalic and atraumatic.      Right Ear: External ear normal. There is impacted cerumen.      Left Ear: Tympanic membrane, ear canal and external ear normal. There is no impacted cerumen.   Eyes:      Extraocular Movements: Extraocular movements intact.      Conjunctiva/sclera: Conjunctivae normal.   Cardiovascular:      Rate and Rhythm: Normal rate and regular rhythm.      Heart sounds: No murmur heard.  Pulmonary:      Effort: Pulmonary effort is normal. No respiratory distress.      Breath sounds: Normal breath sounds.   Abdominal:      General: Abdomen is flat.   Musculoskeletal:         General: No swelling or deformity. Normal range of motion.      Cervical back: Neck supple.      Right lower leg: " Edema present.      Left lower leg: Edema present.   Feet:      Right foot:      Skin integrity: No skin breakdown, erythema or warmth.      Toenail Condition: Right toenails are abnormally thick.      Left foot:      Skin integrity: No skin breakdown, erythema or warmth.      Toenail Condition: Left toenails are abnormally thick.   Skin:     General: Skin is warm and dry.   Neurological:      General: No focal deficit present.      Mental Status: She is alert.   Psychiatric:         Mood and Affect: Mood normal.       Administrative Statements   I have spent a total time of 35 minutes in caring for this patient on the day of the visit/encounter including Instructions for management, Patient and family education, Counseling / Coordination of care, Documenting in the medical record, Reviewing / ordering tests, medicine, procedures  , Obtaining or reviewing history  , and Communicating with other healthcare professionals .

## 2024-11-07 ENCOUNTER — TELEPHONE (OUTPATIENT)
Dept: FAMILY MEDICINE CLINIC | Facility: CLINIC | Age: 89
End: 2024-11-07

## 2024-11-07 NOTE — TELEPHONE ENCOUNTER
Please see voicemail left below from Nurse Ana from [facility].        Hi, my name is Ana. I'm one of the nurses with [facility]. I'm just calling regarding patient Rajwinder Vaca, date of birth 1/23/1928. I just wanted to let you gumel know that she did have an unassisted fall yesterday in her bathroom. No loss of consciousness, no open areas. Her vital signs were stable for me today. She has no concerns. It was just like it scared her and she did have her life alert bracelet on, so she did have access to that. And she was also wondering if you shlomo would be able to assist her in getting new hearing aids as her hearing aid no longer works. So she cannot hear anything that anybody is saying to her. She's just been kind of reading lips for the past couple weeks because they've been giving her a hard time. If you can just give me a call back, my number is [phone]. Thank you.

## 2024-11-07 NOTE — TELEPHONE ENCOUNTER
Called, back pt walking without pain, no bruising, on Xarelto, falled from standing height, slipped, no syncope, no head strike, no complaints. Pt still hard of hearing for the b/l ears sp irrigation, request to f/u next week in office to reevaluate prior to hearing aid evaluation.

## 2024-11-07 NOTE — TELEPHONE ENCOUNTER
Called and left message for patient's son, Raúl, to schedule an appointment regarding hearing aids.

## 2024-11-21 ENCOUNTER — OFFICE VISIT (OUTPATIENT)
Dept: FAMILY MEDICINE CLINIC | Facility: CLINIC | Age: 89
End: 2024-11-21
Payer: MEDICARE

## 2024-11-21 VITALS
HEIGHT: 62 IN | BODY MASS INDEX: 20.8 KG/M2 | SYSTOLIC BLOOD PRESSURE: 122 MMHG | WEIGHT: 113 LBS | OXYGEN SATURATION: 96 % | HEART RATE: 67 BPM | DIASTOLIC BLOOD PRESSURE: 72 MMHG

## 2024-11-21 DIAGNOSIS — Z23 FLU VACCINE NEED: ICD-10-CM

## 2024-11-21 DIAGNOSIS — Z91.81 HX OF FALL: ICD-10-CM

## 2024-11-21 DIAGNOSIS — T16.1XXA FOREIGN BODY OF RIGHT EAR, INITIAL ENCOUNTER: Primary | ICD-10-CM

## 2024-11-21 DIAGNOSIS — I10 BENIGN ESSENTIAL HYPERTENSION: ICD-10-CM

## 2024-11-21 DIAGNOSIS — I50.9 CHRONIC CONGESTIVE HEART FAILURE, UNSPECIFIED HEART FAILURE TYPE (HCC): ICD-10-CM

## 2024-11-21 DIAGNOSIS — H61.21 IMPACTED CERUMEN OF RIGHT EAR: ICD-10-CM

## 2024-11-21 PROCEDURE — 90662 IIV NO PRSV INCREASED AG IM: CPT

## 2024-11-21 PROCEDURE — G0008 ADMIN INFLUENZA VIRUS VAC: HCPCS

## 2024-11-21 RX ORDER — NEOMYCIN SULFATE, POLYMYXIN B SULFATE AND HYDROCORTISONE 10; 3.5; 1 MG/ML; MG/ML; [USP'U]/ML
4 SUSPENSION/ DROPS AURICULAR (OTIC) EVERY 8 HOURS SCHEDULED
Qty: 10 ML | Refills: 0 | Status: SHIPPED | OUTPATIENT
Start: 2024-11-21

## 2024-11-21 NOTE — ASSESSMENT & PLAN NOTE
Some cerumen rinsed out with foreign body extraction.   Orders:    Ambulatory Referral to Otolaryngology; Future    neomycin-polymyxin-hydrocortisone (CORTISPORIN) 0.35%-10,000 units/mL-1% otic suspension; Administer 4 drops to the right ear every 8 (eight) hours

## 2024-11-21 NOTE — ASSESSMENT & PLAN NOTE
Pt came in with complaint of losing the rubber ear piece of the hearing aid at the right ear cannel.   Rubber piece visualized with otoscope and gentally rinsed out with warm water with ear irrigation device. Ear cannel was observed again with otoscope to see intact tympanic membrane with superficial wax. Some mild skin erosion is observed from the foreign body staying at the same position for too long.   Orders:    Ambulatory Referral to Otolaryngology; Future    neomycin-polymyxin-hydrocortisone (CORTISPORIN) 0.35%-10,000 units/mL-1% otic suspension; Administer 4 drops to the right ear every 8 (eight) hours    Ear cerumen removal

## 2024-11-21 NOTE — PROGRESS NOTES
Name: Rajwinder Vaca      : 1928      MRN: [id number]  Encounter Provider: Darci Hester DO  Encounter Date: 2024   Encounter [facility]  :  Assessment & Plan  Foreign body of right ear, initial encounter  Pt came in with complaint of losing the rubber ear piece of the hearing aid at the right ear cannel.   Rubber piece visualized with otoscope and gentally rinsed out with warm water with ear irrigation device. Ear cannel was observed again with otoscope to see intact tympanic membrane with superficial wax. Some mild skin erosion is observed from the foreign body staying at the same position for too long.   Orders:    Ambulatory Referral to Otolaryngology; Future    neomycin-polymyxin-hydrocortisone (CORTISPORIN) 0.35%-10,000 units/mL-1% otic suspension; Administer 4 drops to the right ear every 8 (eight) hours    Ear cerumen removal    Flu vaccine need  Given, no adverse event.   Orders:    influenza vaccine, high-dose, PF 0.5 mL (Fluzone High Dose)    Benign essential hypertension  Well controlled. 122/72 in office       Impacted cerumen of right ear  Some cerumen rinsed out with foreign body extraction.   Orders:    Ambulatory Referral to Otolaryngology; Future    neomycin-polymyxin-hydrocortisone (CORTISPORIN) 0.35%-10,000 units/mL-1% otic suspension; Administer 4 drops to the right ear every 8 (eight) hours    Hx of fall  Recent fall about 1 week ago, no confusion, no dizziness, no weakness, no loss of sensation, the son states pt to look the same as before. Will go to audiology for new hearing aid in 2 weeks, potentially will help with less falls.        Chronic congestive heart failure, unspecified heart failure type (HCC)  Wt Readings from Last 3 Encounters:   24 51.3 kg (113 lb)   10/31/24 51.3 kg (113 lb)   10/25/24 51.3 kg (113 lb)     No increased swelling of the leg, well controlled.                 History of Present Illness     Ear Fullness  "  Pertinent negatives include no headaches.   Ankle Injury   Pertinent negatives include no numbness.     Review of Systems   Constitutional:  Negative for activity change, chills and fever.   Eyes:  Negative for visual disturbance.   Cardiovascular:  Positive for leg swelling (Not increased). Negative for chest pain and palpitations.   Neurological:  Negative for dizziness, speech difficulty, weakness, numbness and headaches.   Psychiatric/Behavioral:  Negative for confusion and decreased concentration. The patient is nervous/anxious (Chronic).           Objective   /72   Pulse 67   Ht 5' 2\" (1.575 m)   Wt 51.3 kg (113 lb)   SpO2 96%   BMI 20.67 kg/m²      Physical Exam  Vitals and nursing note reviewed.   Constitutional:       General: She is not in acute distress.     Appearance: She is well-developed.   HENT:      Head: Normocephalic and atraumatic.      Left Ear: Ear canal normal.      Ears:      Comments: Foreign body appreciated at right ear, fully occluded.   Left ear abnormal TM  Eyes:      Conjunctiva/sclera: Conjunctivae normal.   Cardiovascular:      Rate and Rhythm: Normal rate and regular rhythm.      Heart sounds: Normal heart sounds. No murmur heard.  Pulmonary:      Effort: Pulmonary effort is normal. No respiratory distress.      Breath sounds: Normal breath sounds.   Abdominal:      General: Abdomen is flat.   Musculoskeletal:         General: No swelling (trace to 1+). Normal range of motion.      Cervical back: Neck supple.      Right lower leg: Edema (trace to 1 +) present.      Left lower leg: Edema (trace to 1 +) present.   Skin:     General: Skin is warm and dry.   Neurological:      General: No focal deficit present.      Mental Status: She is alert and oriented to person, place, and time.      Coordination: Coordination normal.      Gait: Gait normal.   Psychiatric:         Mood and Affect: Mood normal.         "

## 2024-11-30 PROBLEM — H61.21 IMPACTED CERUMEN OF RIGHT EAR: Status: RESOLVED | Noted: 2024-10-31 | Resolved: 2024-11-30

## 2024-12-04 ENCOUNTER — TELEPHONE (OUTPATIENT)
Age: 89
End: 2024-12-04

## 2024-12-04 NOTE — TELEPHONE ENCOUNTER
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 Keena feels that patient would benefit greatly from Occupational and Physical therapy. She would valeria an order to be placed for patient for both OT and PT and advised to call [facility] with any questions at [phone].

## 2024-12-10 ENCOUNTER — OFFICE VISIT (OUTPATIENT)
Dept: FAMILY MEDICINE CLINIC | Facility: CLINIC | Age: 89
End: 2024-12-10

## 2024-12-10 VITALS
SYSTOLIC BLOOD PRESSURE: 124 MMHG | HEIGHT: 62 IN | HEART RATE: 79 BPM | WEIGHT: 113 LBS | OXYGEN SATURATION: 96 % | BODY MASS INDEX: 20.8 KG/M2 | DIASTOLIC BLOOD PRESSURE: 82 MMHG

## 2024-12-10 DIAGNOSIS — M17.0 OSTEOARTHRITIS OF BOTH KNEES, UNSPECIFIED OSTEOARTHRITIS TYPE: Primary | ICD-10-CM

## 2024-12-10 RX ORDER — TRIAMCINOLONE ACETONIDE 40 MG/ML
40 INJECTION, SUSPENSION INTRA-ARTICULAR; INTRAMUSCULAR
Status: COMPLETED | OUTPATIENT
Start: 2024-12-10 | End: 2024-12-10

## 2024-12-10 RX ORDER — LIDOCAINE HYDROCHLORIDE 10 MG/ML
2 INJECTION, SOLUTION INFILTRATION; PERINEURAL
Status: COMPLETED | OUTPATIENT
Start: 2024-12-10 | End: 2024-12-10

## 2024-12-10 RX ADMIN — TRIAMCINOLONE ACETONIDE 40 MG: 40 INJECTION, SUSPENSION INTRA-ARTICULAR; INTRAMUSCULAR at 15:00

## 2024-12-10 RX ADMIN — LIDOCAINE HYDROCHLORIDE 2 ML: 10 INJECTION, SOLUTION INFILTRATION; PERINEURAL at 15:00

## 2024-12-10 NOTE — PROGRESS NOTES
"Large joint arthrocentesis: bilateral knee  Universal Protocol:  Procedure performed by: (Dr. Abram Santana, Dr. Armin Hester)  Consent: Verbal consent obtained. Written consent not obtained.  Risks and benefits: risks, benefits and alternatives were discussed  Consent given by: patient  Time out: Immediately prior to procedure a \"time out\" was called to verify the correct patient, procedure, equipment, support staff and site/side marked as required.  Timeout called at: 12/10/2024 3:16 PM.  Patient understanding: patient states understanding of the procedure being performed  Patient consent: the patient's understanding of the procedure matches consent given  Procedure consent: procedure consent matches procedure scheduled  Relevant documents: relevant documents present and verified  Test results: test results available and properly labeled  Site marked: the operative site was marked  Radiology Images displayed and confirmed. If images not available, report reviewed: imaging studies not available  Patient identity confirmed: verbally with patient  Supporting Documentation  Indications: pain   Procedure Details  Location: knee - bilateral knee  Preparation: No touch technique.  Needle size: 25 G  Ultrasound guidance: no  Approach: medial    Medications (Right): 2 mL lidocaine 1 %; 40 mg triamcinolone acetonide 40 mg/mLAspirate amount (Right): 0 mL  Medications (Left): 2 mL lidocaine 1 %; 40 mg triamcinolone acetonide 40 mg/mL   Aspirate amount (Left): 0 mL  Patient tolerance: patient tolerated the procedure well with no immediate complications    Injection provides pain relief for 3 month, no allergy reported.          "

## 2024-12-17 ENCOUNTER — TELEPHONE (OUTPATIENT)
Dept: FAMILY MEDICINE CLINIC | Facility: CLINIC | Age: 89
End: 2024-12-17

## 2024-12-17 NOTE — TELEPHONE ENCOUNTER
Yu from [facility] health called, asking if pt can take otc melatonin 5mg at bedtime.  ty

## 2025-02-06 ENCOUNTER — RA CDI HCC (OUTPATIENT)
Dept: OTHER | Facility: HOSPITAL | Age: OVER 89
End: 2025-02-06

## 2025-02-13 ENCOUNTER — OFFICE VISIT (OUTPATIENT)
Dept: FAMILY MEDICINE CLINIC | Facility: CLINIC | Age: OVER 89
End: 2025-02-13
Payer: MEDICARE

## 2025-02-13 VITALS
TEMPERATURE: 97.6 F | HEART RATE: 83 BPM | DIASTOLIC BLOOD PRESSURE: 76 MMHG | OXYGEN SATURATION: 96 % | SYSTOLIC BLOOD PRESSURE: 118 MMHG | BODY MASS INDEX: 20.8 KG/M2 | WEIGHT: 113 LBS | HEIGHT: 62 IN

## 2025-02-13 DIAGNOSIS — F33.1 MODERATE EPISODE OF RECURRENT MAJOR DEPRESSIVE DISORDER (HCC): ICD-10-CM

## 2025-02-13 DIAGNOSIS — J30.9 ALLERGIC RHINITIS, UNSPECIFIED SEASONALITY, UNSPECIFIED TRIGGER: Primary | ICD-10-CM

## 2025-02-13 DIAGNOSIS — N18.32 STAGE 3B CHRONIC KIDNEY DISEASE (HCC): ICD-10-CM

## 2025-02-13 DIAGNOSIS — K50.919 CROHN'S DISEASE WITH COMPLICATION, UNSPECIFIED GASTROINTESTINAL TRACT LOCATION (HCC): ICD-10-CM

## 2025-02-13 DIAGNOSIS — S06.5X9A TRAUMATIC SUBDURAL HEMORRHAGE WITH LOSS OF CONSCIOUSNESS OF UNSPECIFIED DURATION, INITIAL ENCOUNTER (HCC): ICD-10-CM

## 2025-02-13 DIAGNOSIS — I74.9 THROMBOEMBOLIC DISORDER (HCC): ICD-10-CM

## 2025-02-13 DIAGNOSIS — J44.9 CHRONIC OBSTRUCTIVE PULMONARY DISEASE, UNSPECIFIED COPD TYPE (HCC): ICD-10-CM

## 2025-02-13 DIAGNOSIS — N18.5 CHRONIC KIDNEY DISEASE, STAGE 5 (HCC): ICD-10-CM

## 2025-02-13 DIAGNOSIS — I50.33 ACUTE ON CHRONIC DIASTOLIC HEART FAILURE (HCC): ICD-10-CM

## 2025-02-13 PROCEDURE — 99214 OFFICE O/P EST MOD 30 MIN: CPT | Performed by: FAMILY MEDICINE

## 2025-02-13 RX ORDER — LISINOPRIL 5 MG/1
5 TABLET ORAL DAILY
COMMUNITY
End: 2025-02-13

## 2025-02-13 RX ORDER — AMOXICILLIN 250 MG
1 CAPSULE ORAL EVERY EVENING
COMMUNITY
Start: 2025-01-28

## 2025-02-13 RX ORDER — ACETAMINOPHEN 500 MG
500 TABLET ORAL EVERY 6 HOURS PRN
COMMUNITY

## 2025-02-13 RX ORDER — FLUTICASONE PROPIONATE 50 MCG
2 SPRAY, SUSPENSION (ML) NASAL DAILY
Qty: 47.4 ML | Refills: 3 | Status: SHIPPED | OUTPATIENT
Start: 2025-02-13

## 2025-02-13 RX ORDER — AZELASTINE 1 MG/ML
2 SPRAY, METERED NASAL 2 TIMES DAILY
Qty: 90 ML | Refills: 3 | Status: SHIPPED | OUTPATIENT
Start: 2025-02-13

## 2025-02-18 NOTE — ASSESSMENT & PLAN NOTE
Wt Readings from Last 3 Encounters:   02/13/25 51.3 kg (113 lb)   12/10/24 51.3 kg (113 lb)   11/21/24 51.3 kg (113 lb)

## 2025-02-18 NOTE — ASSESSMENT & PLAN NOTE
Lab Results   Component Value Date    EGFR  01/27/2025     Not performed on patients less than 18 years of age or greater than 97 years of age    EGFR  01/22/2025     Not performed on patients less than 18 years of age or greater than 97 years of age    EGFR  01/20/2025     Not performed on patients less than 18 years of age or greater than 97 years of age    CREATININE 1.05 01/27/2025    CREATININE 1.03 01/22/2025    CREATININE 0.91 01/20/2025

## 2025-02-18 NOTE — PROGRESS NOTES
Name: Rajwinder Vaca      : 1928      MRN: [id number]  Encounter Provider: Abram Santana MD  Encounter Date: 2025   [facility]  :  Assessment & Plan  Allergic rhinitis, unspecified seasonality, unspecified trigger    Orders:    fluticasone (FLONASE) 50 mcg/act nasal spray; 2 sprays into each nostril daily    azelastine (ASTELIN) 0.1 % nasal spray; 2 sprays into each nostril 2 (two) times a day Use in each nostril as directed    Chronic kidney disease, stage 5 (HCC)  Lab Results   Component Value Date    EGFR  2025     Not performed on patients less than 18 years of age or greater than 97 years of age    EGFR  2025     Not performed on patients less than 18 years of age or greater than 97 years of age    EGFR  2025     Not performed on patients less than 18 years of age or greater than 97 years of age    CREATININE 1.05 2025    CREATININE 1.03 2025    CREATININE 0.91 2025            Moderate episode of recurrent major depressive disorder (HCC)         Crohn's disease with complication, unspecified gastrointestinal tract location (HCC)         Thromboembolic disorder (HCC)         Acute on chronic diastolic heart failure (HCC)  Wt Readings from Last 3 Encounters:   25 51.3 kg (113 lb)   12/10/24 51.3 kg (113 lb)   24 51.3 kg (113 lb)                    Chronic obstructive pulmonary disease, unspecified COPD type (HCC)         Traumatic subdural hemorrhage with loss of consciousness of unspecified duration, initial encounter (HCC)         Stage 3b chronic kidney disease (HCC)  Lab Results   Component Value Date    EGFR  2025     Not performed on patients less than 18 years of age or greater than 97 years of age    EGFR  2025     Not performed on patients less than 18 years of age or greater than 97 years of age    EGFR  2025     Not performed on patients less than 18 years of age or  "greater than 97 years of age    CREATININE 1.05 01/27/2025    CREATININE 1.03 01/22/2025    CREATININE 0.91 01/20/2025                   History of Present Illness   She has a new onset stress fracture.  She is not terribly symptomatic from a pain standpoint, however.  She has difficulty with ambulation and is very slow in moving about.  She has been approved for home care and home health aide.  Unfortunately she is having difficulty finding available staffing.  Her son is frustrated with the level of care that she is receiving.  Together we called the [facility] agency on aging and requested an evaluation.  They are going to reach out to her son, Raúl, to schedule home visit and evaluation.    URI       Review of Systems   All other systems reviewed and are negative.      Objective   /76   Pulse 83   Temp 97.6 °F (36.4 °C)   Ht 5' 2\" (1.575 m)   Wt 51.3 kg (113 lb)   SpO2 96%   BMI 20.67 kg/m²      Physical Exam  Vitals and nursing note reviewed.   Constitutional:       Appearance: Normal appearance. She is normal weight.   Cardiovascular:      Rate and Rhythm: Normal rate and regular rhythm.      Pulses: Normal pulses.      Heart sounds: Normal heart sounds.   Pulmonary:      Effort: Pulmonary effort is normal.      Breath sounds: Normal breath sounds.   Abdominal:      General: Abdomen is flat. Bowel sounds are normal.      Palpations: Abdomen is soft.   Musculoskeletal:         General: Normal range of motion.      Cervical back: Normal range of motion and neck supple.   Skin:     General: Skin is warm and dry.      Capillary Refill: Capillary refill takes less than 2 seconds.   Neurological:      General: No focal deficit present.      Mental Status: She is alert and oriented to person, place, and time. Mental status is at baseline.   Psychiatric:         Mood and Affect: Mood normal.         Behavior: Behavior normal.         Thought Content: Thought content normal.         Judgment: Judgment normal. "

## 2025-02-19 ENCOUNTER — TELEPHONE (OUTPATIENT)
Dept: FAMILY MEDICINE CLINIC | Facility: CLINIC | Age: OVER 89
End: 2025-02-19

## 2025-02-19 NOTE — TELEPHONE ENCOUNTER
Pts son called, stated pt is having issues with medications in evening. Son asking if he can give pt mirtazapine in morning?  ty

## 2025-02-27 DIAGNOSIS — F33.1 MODERATE EPISODE OF RECURRENT MAJOR DEPRESSIVE DISORDER (HCC): ICD-10-CM

## 2025-02-28 RX ORDER — ESCITALOPRAM OXALATE 10 MG/1
10 TABLET ORAL DAILY
Qty: 90 TABLET | Refills: 3 | Status: SHIPPED | OUTPATIENT
Start: 2025-02-28

## 2025-03-06 ENCOUNTER — TELEPHONE (OUTPATIENT)
Dept: FAMILY MEDICINE CLINIC | Facility: CLINIC | Age: OVER 89
End: 2025-03-06

## 2025-03-06 NOTE — TELEPHONE ENCOUNTER
Reese, this is Yu, the nurse with [facility]. I'm calling again in regards to Rajwinder Vaca. I just need to make the physician aware and somebody needs to acknowledge it, that Doctor Esther is aware that Rajwinder fell yesterday in her home and has some large ecchymotic areas over the right knee, wrist and elbow and that there is a skin tear to the right elbow. So someone could please call me back. My number is [phone]. Thank you.

## 2025-03-14 ENCOUNTER — TELEPHONE (OUTPATIENT)
Dept: FAMILY MEDICINE CLINIC | Facility: CLINIC | Age: OVER 89
End: 2025-03-14

## 2025-03-14 NOTE — TELEPHONE ENCOUNTER
Reese, my name is Yu a home health nurse with [facility].  Rajwinder has an open skin wound on her right elbow and we are calling to see if you would like any special dressings or anything or anything other then soap and water.   It is not draining but the skin is removed from that area. If you could please call me back at [phone] and I could write an order up for that, it would be appreciated. Thank you.

## 2025-04-07 DIAGNOSIS — I74.9 THROMBOEMBOLIC DISORDER (HCC): ICD-10-CM

## 2025-04-07 DIAGNOSIS — K21.9 GASTROESOPHAGEAL REFLUX DISEASE: ICD-10-CM

## 2025-04-07 DIAGNOSIS — F32.89 OTHER DEPRESSION: ICD-10-CM

## 2025-04-08 RX ORDER — RIVAROXABAN 20 MG/1
20 TABLET, FILM COATED ORAL DAILY
Qty: 90 TABLET | Refills: 1 | Status: SHIPPED | OUTPATIENT
Start: 2025-04-08

## 2025-04-08 RX ORDER — ESOMEPRAZOLE MAGNESIUM 40 MG/1
40 CAPSULE, DELAYED RELEASE ORAL DAILY
Qty: 90 CAPSULE | Refills: 1 | Status: SHIPPED | OUTPATIENT
Start: 2025-04-08

## 2025-04-15 ENCOUNTER — DOCUMENTATION (OUTPATIENT)
Dept: ADMINISTRATIVE | Facility: OTHER | Age: OVER 89
End: 2025-04-15

## 2025-04-15 NOTE — PROGRESS NOTES
04/15/25 7:50 AM    Annual Wellness Visit outreach is not required, patient is living in a nursing home/ long term care facility/ other facility.     Thank you.  TIP TAVERA MA  [facility]

## 2025-04-30 ENCOUNTER — TELEPHONE (OUTPATIENT)
Dept: FAMILY MEDICINE CLINIC | Facility: CLINIC | Age: OVER 89
End: 2025-04-30

## 2025-04-30 NOTE — TELEPHONE ENCOUNTER
Yu-[facility] home health nurse called stated pts weight today was 104, pt has no leg edema, pulse ox 99, no symptoms.  Per nurse she just wanted to make you aware of vitals.  ty

## 2025-05-19 DIAGNOSIS — F33.1 MODERATE EPISODE OF RECURRENT MAJOR DEPRESSIVE DISORDER (HCC): ICD-10-CM

## 2025-05-19 DIAGNOSIS — F32.89 OTHER DEPRESSION: ICD-10-CM

## 2025-05-19 RX ORDER — ESCITALOPRAM OXALATE 10 MG/1
10 TABLET ORAL DAILY
Qty: 90 TABLET | Refills: 3 | Status: SHIPPED | OUTPATIENT
Start: 2025-05-19

## 2025-05-19 RX ORDER — MIRTAZAPINE 15 MG/1
15 TABLET, FILM COATED ORAL
Qty: 90 TABLET | Refills: 3 | Status: SHIPPED | OUTPATIENT
Start: 2025-05-19

## 2025-07-14 ENCOUNTER — RA CDI HCC (OUTPATIENT)
Dept: OTHER | Facility: HOSPITAL | Age: OVER 89
End: 2025-07-14

## 2025-07-14 DIAGNOSIS — I74.9 THROMBOEMBOLIC DISORDER (HCC): ICD-10-CM

## 2025-07-14 DIAGNOSIS — F32.89 OTHER DEPRESSION: ICD-10-CM

## 2025-07-14 RX ORDER — RIVAROXABAN 20 MG/1
20 TABLET, FILM COATED ORAL DAILY
Qty: 90 TABLET | Refills: 1 | Status: SHIPPED | OUTPATIENT
Start: 2025-07-14 | End: 2025-07-21 | Stop reason: SDUPTHER

## 2025-07-14 NOTE — TELEPHONE ENCOUNTER
Patient child came to the office asking if you can send a prescription to [facility] for her Xarelto. Thank you!

## 2025-07-21 ENCOUNTER — OFFICE VISIT (OUTPATIENT)
Dept: FAMILY MEDICINE CLINIC | Facility: CLINIC | Age: OVER 89
End: 2025-07-21
Payer: MEDICARE

## 2025-07-21 ENCOUNTER — TELEPHONE (OUTPATIENT)
Dept: FAMILY MEDICINE CLINIC | Facility: CLINIC | Age: OVER 89
End: 2025-07-21

## 2025-07-21 VITALS
DIASTOLIC BLOOD PRESSURE: 72 MMHG | TEMPERATURE: 97.3 F | BODY MASS INDEX: 20.8 KG/M2 | HEIGHT: 62 IN | RESPIRATION RATE: 18 BRPM | OXYGEN SATURATION: 97 % | WEIGHT: 113 LBS | HEART RATE: 86 BPM | SYSTOLIC BLOOD PRESSURE: 128 MMHG

## 2025-07-21 DIAGNOSIS — I50.33 ACUTE ON CHRONIC DIASTOLIC HEART FAILURE (HCC): ICD-10-CM

## 2025-07-21 DIAGNOSIS — F41.9 ANXIETY: ICD-10-CM

## 2025-07-21 DIAGNOSIS — K21.9 GASTROESOPHAGEAL REFLUX DISEASE: ICD-10-CM

## 2025-07-21 DIAGNOSIS — D23.9 ACANTHOMA: Primary | ICD-10-CM

## 2025-07-21 DIAGNOSIS — M17.0 OSTEOARTHRITIS OF BOTH KNEES, UNSPECIFIED OSTEOARTHRITIS TYPE: ICD-10-CM

## 2025-07-21 DIAGNOSIS — F32.89 OTHER DEPRESSION: ICD-10-CM

## 2025-07-21 DIAGNOSIS — E87.6 HYPOKALEMIA: ICD-10-CM

## 2025-07-21 DIAGNOSIS — I45.9 HEART BLOCK: ICD-10-CM

## 2025-07-21 DIAGNOSIS — F33.1 MODERATE EPISODE OF RECURRENT MAJOR DEPRESSIVE DISORDER (HCC): ICD-10-CM

## 2025-07-21 DIAGNOSIS — I74.9 THROMBOEMBOLIC DISORDER (HCC): ICD-10-CM

## 2025-07-21 PROBLEM — N18.5 CHRONIC KIDNEY DISEASE, STAGE 5 (HCC): Status: RESOLVED | Noted: 2025-02-13 | Resolved: 2025-07-21

## 2025-07-21 PROCEDURE — 99214 OFFICE O/P EST MOD 30 MIN: CPT | Performed by: FAMILY MEDICINE

## 2025-07-21 RX ORDER — MIRTAZAPINE 15 MG/1
15 TABLET, FILM COATED ORAL
Qty: 90 TABLET | Refills: 3 | Status: SHIPPED | OUTPATIENT
Start: 2025-07-21

## 2025-07-21 RX ORDER — ALPRAZOLAM 0.25 MG
0.25 TABLET ORAL EVERY 12 HOURS PRN
Qty: 60 TABLET | Refills: 5 | Status: SHIPPED | OUTPATIENT
Start: 2025-07-21

## 2025-07-21 RX ORDER — RIVAROXABAN 20 MG/1
20 TABLET, FILM COATED ORAL DAILY
Qty: 90 TABLET | Refills: 3 | Status: SHIPPED | OUTPATIENT
Start: 2025-07-21

## 2025-07-21 RX ORDER — ESOMEPRAZOLE MAGNESIUM 40 MG/1
40 CAPSULE, DELAYED RELEASE ORAL DAILY
Qty: 90 CAPSULE | Refills: 1 | Status: SHIPPED | OUTPATIENT
Start: 2025-07-21

## 2025-07-21 RX ORDER — ESCITALOPRAM OXALATE 10 MG/1
10 TABLET ORAL DAILY
Qty: 90 TABLET | Refills: 3 | Status: SHIPPED | OUTPATIENT
Start: 2025-07-21

## 2025-07-21 RX ORDER — FUROSEMIDE 20 MG/1
20 TABLET ORAL 2 TIMES DAILY
Qty: 90 TABLET | Refills: 3 | Status: SHIPPED | OUTPATIENT
Start: 2025-07-21

## 2025-07-21 RX ORDER — POTASSIUM CHLORIDE 1500 MG/1
20 TABLET, EXTENDED RELEASE ORAL DAILY
Qty: 90 TABLET | Refills: 5 | Status: SHIPPED | OUTPATIENT
Start: 2025-07-21

## 2025-07-21 RX ADMIN — TRIAMCINOLONE ACETONIDE 40 MG: 40 INJECTION, SUSPENSION INTRA-ARTICULAR; INTRAMUSCULAR at 11:45

## 2025-07-21 RX ADMIN — LIDOCAINE HYDROCHLORIDE 4 ML: 10 INJECTION, SOLUTION INFILTRATION; PERINEURAL at 11:45

## 2025-07-21 NOTE — TELEPHONE ENCOUNTER
Spoke to pt's son and told him of apt I scheduled for Rajwinder at [facility] for 9-2-25 at 11:30 am.

## 2025-07-24 ENCOUNTER — DOCUMENTATION (OUTPATIENT)
Dept: FAMILY MEDICINE CLINIC | Facility: CLINIC | Age: OVER 89
End: 2025-07-24

## 2025-07-28 RX ORDER — LIDOCAINE HYDROCHLORIDE 10 MG/ML
4 INJECTION, SOLUTION INFILTRATION; PERINEURAL
Status: COMPLETED | OUTPATIENT
Start: 2025-07-21 | End: 2025-07-21

## 2025-07-28 RX ORDER — TRIAMCINOLONE ACETONIDE 40 MG/ML
40 INJECTION, SUSPENSION INTRA-ARTICULAR; INTRAMUSCULAR
Status: COMPLETED | OUTPATIENT
Start: 2025-07-21 | End: 2025-07-21

## 2025-08-08 ENCOUNTER — DOCUMENTATION (OUTPATIENT)
Dept: ADMINISTRATIVE | Facility: OTHER | Age: OVER 89
End: 2025-08-08